# Patient Record
Sex: FEMALE | Race: BLACK OR AFRICAN AMERICAN | NOT HISPANIC OR LATINO | Employment: OTHER | URBAN - NONMETROPOLITAN AREA
[De-identification: names, ages, dates, MRNs, and addresses within clinical notes are randomized per-mention and may not be internally consistent; named-entity substitution may affect disease eponyms.]

---

## 2021-05-26 PROBLEM — Q66.6 PES PLANOVALGUS: Status: ACTIVE | Noted: 2021-05-26

## 2022-01-14 ENCOUNTER — OFFICE VISIT (OUTPATIENT)
Dept: FAMILY MEDICINE | Facility: CLINIC | Age: 47
End: 2022-01-14
Payer: OTHER GOVERNMENT

## 2022-01-14 VITALS
HEART RATE: 96 BPM | OXYGEN SATURATION: 100 % | BODY MASS INDEX: 26.03 KG/M2 | WEIGHT: 162 LBS | HEIGHT: 66 IN | RESPIRATION RATE: 18 BRPM | SYSTOLIC BLOOD PRESSURE: 167 MMHG | DIASTOLIC BLOOD PRESSURE: 112 MMHG

## 2022-01-14 DIAGNOSIS — M25.50 ARTHRALGIA, UNSPECIFIED JOINT: ICD-10-CM

## 2022-01-14 DIAGNOSIS — R26.9 GAIT ABNORMALITY: ICD-10-CM

## 2022-01-14 DIAGNOSIS — M25.20 JOINT LAXITY: ICD-10-CM

## 2022-01-14 DIAGNOSIS — M21.40 PES PLANUS, UNSPECIFIED LATERALITY: Primary | ICD-10-CM

## 2022-01-14 LAB
ALBUMIN SERPL BCP-MCNC: 3.5 G/DL (ref 3.5–5)
ALBUMIN/GLOB SERPL: 0.7 {RATIO}
ALP SERPL-CCNC: 74 U/L (ref 39–100)
ALT SERPL W P-5'-P-CCNC: 16 U/L (ref 13–56)
ANION GAP SERPL CALCULATED.3IONS-SCNC: 7 MMOL/L (ref 7–16)
AST SERPL W P-5'-P-CCNC: 16 U/L (ref 15–37)
BASOPHILS # BLD AUTO: 0.03 K/UL (ref 0–0.2)
BASOPHILS NFR BLD AUTO: 0.8 % (ref 0–1)
BILIRUB SERPL-MCNC: 0.1 MG/DL (ref 0–1.2)
BUN SERPL-MCNC: 18 MG/DL (ref 7–18)
BUN/CREAT SERPL: 17 (ref 6–20)
CALCIUM SERPL-MCNC: 9 MG/DL (ref 8.5–10.1)
CHLORIDE SERPL-SCNC: 109 MMOL/L (ref 98–107)
CO2 SERPL-SCNC: 27 MMOL/L (ref 21–32)
CREAT SERPL-MCNC: 1.03 MG/DL (ref 0.55–1.02)
CRP SERPL-MCNC: <0.29 MG/DL (ref 0–0.8)
DIFFERENTIAL METHOD BLD: ABNORMAL
EOSINOPHIL # BLD AUTO: 0.04 K/UL (ref 0–0.5)
EOSINOPHIL NFR BLD AUTO: 1.1 % (ref 1–4)
ERYTHROCYTE [DISTWIDTH] IN BLOOD BY AUTOMATED COUNT: 17.2 % (ref 11.5–14.5)
ERYTHROCYTE [SEDIMENTATION RATE] IN BLOOD BY WESTERGREN METHOD: 41 MM/HR (ref 0–20)
GLOBULIN SER-MCNC: 5 G/DL (ref 2–4)
GLUCOSE SERPL-MCNC: 80 MG/DL (ref 74–106)
HCT VFR BLD AUTO: 31.6 % (ref 38–47)
HGB BLD-MCNC: 9.6 G/DL (ref 12–16)
IMM GRANULOCYTES # BLD AUTO: 0.01 K/UL (ref 0–0.04)
IMM GRANULOCYTES NFR BLD: 0.3 % (ref 0–0.4)
LYMPHOCYTES # BLD AUTO: 1.53 K/UL (ref 1–4.8)
LYMPHOCYTES NFR BLD AUTO: 41.9 % (ref 27–41)
MCH RBC QN AUTO: 24.9 PG (ref 27–31)
MCHC RBC AUTO-ENTMCNC: 30.4 G/DL (ref 32–36)
MCV RBC AUTO: 82.1 FL (ref 80–96)
MONOCYTES # BLD AUTO: 0.52 K/UL (ref 0–0.8)
MONOCYTES NFR BLD AUTO: 14.2 % (ref 2–6)
MPC BLD CALC-MCNC: 8.3 FL (ref 9.4–12.4)
NEUTROPHILS # BLD AUTO: 1.52 K/UL (ref 1.8–7.7)
NEUTROPHILS NFR BLD AUTO: 41.7 % (ref 53–65)
NRBC # BLD AUTO: 0 X10E3/UL
NRBC, AUTO (.00): 0 %
PLATELET # BLD AUTO: 535 K/UL (ref 150–400)
POTASSIUM SERPL-SCNC: 3.5 MMOL/L (ref 3.5–5.1)
PROT SERPL-MCNC: 8.5 G/DL (ref 6.4–8.2)
RBC # BLD AUTO: 3.85 M/UL (ref 4.2–5.4)
RHEUMATOID FACT SER NEPH-ACNC: NEGATIVE [IU]/ML
SODIUM SERPL-SCNC: 139 MMOL/L (ref 136–145)
TSH SERPL DL<=0.005 MIU/L-ACNC: 0.57 UIU/ML (ref 0.36–3.74)
WBC # BLD AUTO: 3.65 K/UL (ref 4.5–11)

## 2022-01-14 PROCEDURE — 86038 ANTINUCLEAR ANTIBODIES: CPT | Mod: ,,, | Performed by: CLINICAL MEDICAL LABORATORY

## 2022-01-14 PROCEDURE — 86038 ANA EIA W/REFLEX DSDNA/ENA: ICD-10-PCS | Mod: ,,, | Performed by: CLINICAL MEDICAL LABORATORY

## 2022-01-14 PROCEDURE — 80053 COMPREHENSIVE METABOLIC PANEL: ICD-10-PCS | Mod: ,,, | Performed by: CLINICAL MEDICAL LABORATORY

## 2022-01-14 PROCEDURE — 85025 COMPLETE CBC W/AUTO DIFF WBC: CPT | Mod: ,,, | Performed by: CLINICAL MEDICAL LABORATORY

## 2022-01-14 PROCEDURE — 84443 TSH: ICD-10-PCS | Mod: ,,, | Performed by: CLINICAL MEDICAL LABORATORY

## 2022-01-14 PROCEDURE — 86430 RHEUMATOID FACTOR TEST QUAL: CPT | Mod: ,,, | Performed by: CLINICAL MEDICAL LABORATORY

## 2022-01-14 PROCEDURE — 99213 PR OFFICE/OUTPT VISIT, EST, LEVL III, 20-29 MIN: ICD-10-PCS | Mod: ,,, | Performed by: NURSE PRACTITIONER

## 2022-01-14 PROCEDURE — 99213 OFFICE O/P EST LOW 20 MIN: CPT | Mod: ,,, | Performed by: NURSE PRACTITIONER

## 2022-01-14 PROCEDURE — 84443 ASSAY THYROID STIM HORMONE: CPT | Mod: ,,, | Performed by: CLINICAL MEDICAL LABORATORY

## 2022-01-14 PROCEDURE — 86140 C-REACTIVE PROTEIN: ICD-10-PCS | Mod: ,,, | Performed by: CLINICAL MEDICAL LABORATORY

## 2022-01-14 PROCEDURE — 85651 SEDIMENTATION RATE, AUTOMATED: ICD-10-PCS | Mod: ,,, | Performed by: CLINICAL MEDICAL LABORATORY

## 2022-01-14 PROCEDURE — 86430 RHEUMATOID FACTOR SCREEN: ICD-10-PCS | Mod: ,,, | Performed by: CLINICAL MEDICAL LABORATORY

## 2022-01-14 PROCEDURE — 85651 RBC SED RATE NONAUTOMATED: CPT | Mod: ,,, | Performed by: CLINICAL MEDICAL LABORATORY

## 2022-01-14 PROCEDURE — 80053 COMPREHEN METABOLIC PANEL: CPT | Mod: ,,, | Performed by: CLINICAL MEDICAL LABORATORY

## 2022-01-14 PROCEDURE — 86140 C-REACTIVE PROTEIN: CPT | Mod: ,,, | Performed by: CLINICAL MEDICAL LABORATORY

## 2022-01-14 PROCEDURE — 85025 CBC WITH DIFFERENTIAL: ICD-10-PCS | Mod: ,,, | Performed by: CLINICAL MEDICAL LABORATORY

## 2022-01-14 RX ORDER — LOSARTAN POTASSIUM 100 MG/1
1 TABLET ORAL DAILY
COMMUNITY
Start: 2021-07-21 | End: 2022-01-28 | Stop reason: SDUPTHER

## 2022-01-14 NOTE — PROGRESS NOTES
JOSE RAMON Jama   [facility]      PATIENT NAME: Laurel Mary  : 1975  DATE: 22  MRN: [id number]      Patient PCP Information     Provider PCP Type    Primary Doctor No General          Reason for Visit / Chief Complaint: Pain (Pt states she has joint pain in lower back and lower extremeties )         History of Present Illness / Problem Focused Workflow     Laurel Mary presents to the clinic with Pain (Pt states she has joint pain in lower back and lower extremeties )     HPI   Patient has been seen by multiple orthopedic and foot and ankle surgeons (Dr Harrison, Dr Hannon, Dr Clifford, Dr Espinal (Podiatrist). Some of which recommneded patient have surgery for bilateral flat feet (severe pes planovalgus) and progressive genu valgus  Joint pain from back down. Foot and ankle pain worse. Right greater than left ankle progressively weaker and rolling in. Patient does report prior MRI at [facility] for lower back pain. Patient L5 facet joint hypertrophy per patient. Otherwise unremarkable.   Incontinence of urine for approx 10 year. Cannot stand for long period of time to wash dishes or do hair wo sitting. Upper body does not tire out. Lower body cannot stand for long periods of time. Gait with feet pointing out. Shuffling gait. Patient does use rollaider walker at times which helps take some weight off lower ext when ambulating. Increased weight gain in last 2 years. Unable to exercise.     Review of Systems     Review of Systems   Constitutional: Negative.    HENT: Negative.    Eyes: Negative.    Respiratory: Negative.    Cardiovascular: Negative.    Gastrointestinal: Negative.    Endocrine: Negative.    Genitourinary:        Urinary incontinence   Musculoskeletal: Positive for arthralgias, back pain, gait problem and myalgias.   Skin: Negative.    Allergic/Immunologic: Negative.    Neurological: Positive for weakness. Negative for dizziness, seizures, syncope, facial  "asymmetry, speech difficulty, light-headedness, numbness and headaches.   Hematological: Negative.    Psychiatric/Behavioral: Negative.        Medical / Social / Family History     Past Medical History:   Diagnosis Date    Foot drop     Hypertension     Osteoarthritis        History reviewed. No pertinent surgical history.    Social History  Ms.  reports that she has never smoked. She has never used smokeless tobacco.    Family History  Ms.'s family history includes Diabetes in her mother; Hypertension in her father and mother; Leukemia in her father.    Medications and Allergies     Medications  Outpatient Medications Marked as Taking for the 1/14/22 encounter (Office Visit) with JOSE RAMON Jama   Medication Sig Dispense Refill    losartan (COZAAR) 100 MG tablet Take 1 tablet by mouth once daily.         Allergies  Review of patient's allergies indicates:  No Known Allergies    Physical Examination     Vitals:    01/14/22 1034 01/14/22 1035   BP: (!) 161/96 (!) 167/112   BP Location: Right arm Left arm   Patient Position: Sitting Sitting   Pulse: 96    Resp: 18    SpO2: 100%    Weight: 73.5 kg (162 lb)    Height: 5' 6" (1.676 m)    patient did not take blood pressure medication prior to arrival.     Physical Exam  Vitals reviewed.   Constitutional:       Appearance: Normal appearance.   HENT:      Head: Normocephalic.      Right Ear: External ear normal.      Left Ear: External ear normal.      Nose: Nose normal.   Eyes:      Extraocular Movements: Extraocular movements intact.   Cardiovascular:      Rate and Rhythm: Normal rate and regular rhythm.      Pulses: Normal pulses.      Heart sounds: Normal heart sounds.   Pulmonary:      Effort: Pulmonary effort is normal.      Breath sounds: Normal breath sounds.   Abdominal:      Palpations: Abdomen is soft.   Musculoskeletal:      Cervical back: Normal range of motion.      Comments: Severe bilateral pes planus with valgus deformity at ankle, knees. " Weakness L>R lower ext  Shuffling gait. Ankles rolling in. Hunch over appearance difficulty holding back up at times.    Skin:     General: Skin is warm and dry.      Capillary Refill: Capillary refill takes less than 2 seconds.   Neurological:      General: No focal deficit present.      Mental Status: She is alert and oriented to person, place, and time.      Motor: Weakness present.      Coordination: Coordination abnormal.      Gait: Gait abnormal.   Psychiatric:         Mood and Affect: Mood normal.         Behavior: Behavior normal.         Thought Content: Thought content normal.         Judgment: Judgment normal.           No visits with results within 14 Day(s) from this visit.   Latest known visit with results is:   No results found for any previous visit.             Assessment and Plan (including Health Maintenance)     Plan:   Pes planus bilateral  Joint laxitys, unspecified laterality  Arthralgia, unspecified joint  Gait abnormality  -     Rheumatoid Factor Screen; Future; Expected date: 01/14/2022  -     MERLY EIA w/ Reflex to dsDNA/ISAIAH; Future; Expected date: 01/14/2022  -     Sedimentation Rate; Future; Expected date: 01/14/2022  -     C-Reactive Protein; Future; Expected date: 01/14/2022  -     Comprehensive Metabolic Panel; Future; Expected date: 01/14/2022  -     CBC Auto Differential; Future; Expected date: 01/14/2022  -     TSH; Future; Expected date: 01/14/2022    Referrals have been placed for neurology and rheumoatology to rule out neuromuscular disorder.   Financial assistance application packet printed out for patient.     Hypertension, uncontrolled  -encouraged medication compliance. Reviewed meds and education on schedule   Close follow up in 2 weeks for bp recheck and to discuss referrals         There are no Patient Instructions on file for this visit.       Health Maintenance Due   Topic Date Due    Hepatitis C Screening  Never done    Lipid Panel  Never done    COVID-19 Vaccine (1)  Never done    HIV Screening  Never done    TETANUS VACCINE  Never done    Mammogram  Never done    Cervical Cancer Screening  Never done    Influenza Vaccine (1) 09/01/2021         There is no immunization history on file for this patient.     Problem List Items Addressed This Visit    None     Visit Diagnoses     Pes planus, unspecified laterality    -  Primary    Relevant Orders    Rheumatoid Factor Screen    MERLY EIA w/ Reflex to dsDNA/ISAIAH    Sedimentation Rate    C-Reactive Protein    Comprehensive Metabolic Panel    CBC Auto Differential    TSH    Gait abnormality        Relevant Orders    Rheumatoid Factor Screen    MERLY EIA w/ Reflex to dsDNA/ISAIAH    Sedimentation Rate    C-Reactive Protein    Comprehensive Metabolic Panel    CBC Auto Differential    TSH    Ambulatory referral/consult to Neurology    Ambulatory referral/consult to Rheumatology    Joint laxity        Relevant Orders    Rheumatoid Factor Screen    MERLY EIA w/ Reflex to dsDNA/ISAIAH    Sedimentation Rate    C-Reactive Protein    Comprehensive Metabolic Panel    CBC Auto Differential    TSH    Ambulatory referral/consult to Neurology    Ambulatory referral/consult to Rheumatology    Arthralgia, unspecified joint        Relevant Orders    Rheumatoid Factor Screen    MERLY EIA w/ Reflex to dsDNA/ISAIAH    Sedimentation Rate    C-Reactive Protein    Comprehensive Metabolic Panel    CBC Auto Differential    TSH    Ambulatory referral/consult to Neurology    Ambulatory referral/consult to Rheumatology          Health Maintenance Topics with due status: Not Due       Topic Last Completion Date    Colorectal Cancer Screening 10/01/2021       Future Appointments   Date Time Provider Department Center   1/17/2022 10:00 AM JOSE RAMON [facility] RY[facility]            Signature:  JOSE RAMON [facility]     Date of encounter: 1/14/22

## 2022-01-18 ENCOUNTER — TELEPHONE (OUTPATIENT)
Dept: PEDIATRICS | Facility: CLINIC | Age: 47
End: 2022-01-18
Payer: OTHER GOVERNMENT

## 2022-01-18 LAB — ANA SER QL: NEGATIVE

## 2022-01-18 NOTE — TELEPHONE ENCOUNTER
Called [facility] to make neurology referral. Unable to make neurology or rheumatology referrals until financial counseling complete. As patient is currently self pay. Referral for financial counseling placed per Amelia with neurology at [facility]. Will fax records/clinic note. Patient needs to call Financial counseling office at [phone] to start pre screening process. Attempted to call patient and notify her to call and begin process. No answer. Left message for her to call Chencho NP at clinic to discuss. Fax for [facility] neurology [phone]

## 2022-01-28 ENCOUNTER — OFFICE VISIT (OUTPATIENT)
Dept: FAMILY MEDICINE | Facility: CLINIC | Age: 47
End: 2022-01-28
Payer: OTHER GOVERNMENT

## 2022-01-28 VITALS
HEART RATE: 90 BPM | OXYGEN SATURATION: 100 % | WEIGHT: 158.25 LBS | SYSTOLIC BLOOD PRESSURE: 174 MMHG | HEIGHT: 66 IN | TEMPERATURE: 98 F | DIASTOLIC BLOOD PRESSURE: 123 MMHG | BODY MASS INDEX: 25.43 KG/M2 | RESPIRATION RATE: 18 BRPM

## 2022-01-28 DIAGNOSIS — M25.20 JOINT LAXITY: ICD-10-CM

## 2022-01-28 DIAGNOSIS — M21.42 PES PLANUS OF BOTH FEET: ICD-10-CM

## 2022-01-28 DIAGNOSIS — I10 UNCONTROLLED HYPERTENSION: Primary | ICD-10-CM

## 2022-01-28 DIAGNOSIS — R53.1 WEAKNESS: ICD-10-CM

## 2022-01-28 DIAGNOSIS — M21.41 PES PLANUS OF BOTH FEET: ICD-10-CM

## 2022-01-28 PROCEDURE — 99214 OFFICE O/P EST MOD 30 MIN: CPT | Mod: ,,, | Performed by: NURSE PRACTITIONER

## 2022-01-28 PROCEDURE — 99214 PR OFFICE/OUTPT VISIT, EST, LEVL IV, 30-39 MIN: ICD-10-PCS | Mod: ,,, | Performed by: NURSE PRACTITIONER

## 2022-01-28 RX ORDER — LOSARTAN POTASSIUM 100 MG/1
100 TABLET ORAL DAILY
Qty: 90 TABLET | Refills: 3 | Status: SHIPPED | OUTPATIENT
Start: 2022-01-28 | End: 2022-04-12 | Stop reason: SDUPTHER

## 2022-01-28 NOTE — PROGRESS NOTES
Olivia Linares, JOSE RAMON   [facility]      PATIENT NAME: Laurel Mary  : 1975  DATE: 22  MRN: [id number]      Patient PCP Information     Provider PCP Type    Primary Doctor No General          Reason for Visit / Chief Complaint: Follow-up (Room6)       History of Present Illness / Problem Focused Workflow     Laurel Mary presents to the clinic with Follow-up (Room6)     HPI   Patient here for follow up on HTN, lower ext weakness at flat foot with gait abnormality.   As previously noted referrals were placed on last visit for neurology and rheumatology eval due to lower ext   Weakness and joint pain. Neuromuscular do needs to be evaluated. Patient has been seen by multiple podiatrist and orthopedic surgeons.   Some of which recommended surgery for  Pes planus some of which recommended neuro evaluation as well.   Spoke with [facility] prev to schedule appt. Financial assistance recommended. Patient states she did call financial assistance number for prescreening however patient [facility], however she [facility]. Patient informed me [facility] needed proof of referral. Copy of prev note with documented referral recommendation given to patient to send to [facility].   Patient also with continued HTN today. Patient has continued to be noncompliant with blood pressure medication. Patient states she forgets to take and also spilled bottle of blood pressure medication. Novasc was dc in past due to lower ext swelling. Patient was prescribed to take losartan.   Had lengthy discussion with patient on risk associated with uncontrolled blood pressure. Stressed importance of medication compliance. Cannot determine efficacy of medication if she is not compliant. Patient verbalized understanding.     Review of Systems     Review of Systems   Constitutional: Negative for activity change, appetite change, chills,  diaphoresis, fatigue, fever and unexpected weight change.   HENT: Negative for congestion, ear pain, facial swelling, hearing loss, nosebleeds and sore throat.    Respiratory: Negative for apnea, cough, shortness of breath and wheezing.    Cardiovascular: Negative for chest pain, palpitations and leg swelling.   Gastrointestinal: Negative for abdominal distention, abdominal pain, blood in stool, constipation, diarrhea and nausea.   Endocrine: Negative for cold intolerance, heat intolerance, polydipsia, polyphagia and polyuria.   Genitourinary: Negative for decreased urine volume, difficulty urinating, dysuria, flank pain, frequency, hematuria and urgency.   Musculoskeletal: Positive for arthralgias, back pain, gait problem and myalgias. Negative for joint swelling.   Skin: Negative for color change and rash.   Neurological: Positive for numbness. Negative for dizziness, tremors, seizures, syncope, facial asymmetry, speech difficulty, weakness, light-headedness and headaches.        Tingling to tongue   Hematological: Negative for adenopathy. Does not bruise/bleed easily.   Psychiatric/Behavioral: Negative for behavioral problems and confusion.       Medical / Social / Family History     Past Medical History:   Diagnosis Date    Foot drop     Hypertension     Osteoarthritis        History reviewed. No pertinent surgical history.    Social History  Ms.  reports that she has never smoked. She has never used smokeless tobacco.    Family History  MsAarti's family history includes Diabetes in her mother; Hypertension in her father and mother; Leukemia in her father.    Medications and Allergies     Medications  No outpatient medications have been marked as taking for the 1/28/22 encounter (Office Visit) with JOSE RAMON Jama.       Allergies  Review of patient's allergies indicates:  No Known Allergies    Physical Examination     Vitals:    01/28/22 0930 01/28/22 0931   BP: (!) 158/107 (!) 174/123   BP Location: Left  "arm Right arm   Patient Position: Sitting Sitting   Pulse: 90    Resp: 18    Temp: 98.2 °F (36.8 °C)    SpO2: 100%    Weight: 71.8 kg (158 lb 4 oz)    Height: 5' 6" (1.676 m)    Noncompliant with medication     Physical Exam  Vitals reviewed.   Constitutional:       Appearance: Normal appearance.   HENT:      Head: Normocephalic.      Right Ear: External ear normal.      Left Ear: External ear normal.      Nose: Nose normal.      Mouth/Throat:      Mouth: Mucous membranes are moist.   Eyes:      Extraocular Movements: Extraocular movements intact.   Cardiovascular:      Rate and Rhythm: Normal rate.      Pulses: Normal pulses.   Pulmonary:      Effort: Pulmonary effort is normal.      Breath sounds: Normal breath sounds.   Abdominal:      General: Abdomen is flat.   Musculoskeletal:      Cervical back: Normal range of motion.      Comments: Bilateral pes planus with laxity at ankle gait abnormality   Weakness L>R   Skin:     General: Skin is warm and dry.      Capillary Refill: Capillary refill takes less than 2 seconds.   Neurological:      General: No focal deficit present.      Mental Status: She is alert and oriented to person, place, and time.   Psychiatric:         Mood and Affect: Mood normal.         Behavior: Behavior normal.         Thought Content: Thought content normal.         Judgment: Judgment normal.           No visits with results within 14 Day(s) from this visit.   Latest known visit with results is:   Office Visit on 01/14/2022   Component Date Value Ref Range Status    RA 01/14/2022 Negative  Negative Final    MERLY Screen 01/14/2022 Negative  Negative Final    ESR Westergren 01/14/2022 41* 0 - 20 mm/Hr Final    CRP 01/14/2022 <0.29  0.00 - 0.80 mg/dL Final    Sodium 01/14/2022 139  136 - 145 mmol/L Final    Potassium 01/14/2022 3.5  3.5 - 5.1 mmol/L Final    Chloride 01/14/2022 109* 98 - 107 mmol/L Final    CO2 01/14/2022 27  21 - 32 mmol/L Final    Anion Gap 01/14/2022 7  7 - 16 mmol/L " Final    Glucose 01/14/2022 80  74 - 106 mg/dL Final    BUN 01/14/2022 18  7 - 18 mg/dL Final    Creatinine 01/14/2022 1.03* 0.55 - 1.02 mg/dL Final    BUN/Creatinine Ratio 01/14/2022 17  6 - 20 Final    Calcium 01/14/2022 9.0  8.5 - 10.1 mg/dL Final    Total Protein 01/14/2022 8.5* 6.4 - 8.2 g/dL Final    Albumin 01/14/2022 3.5  3.5 - 5.0 g/dL Final    Globulin 01/14/2022 5.0* 2.0 - 4.0 g/dL Final    A/G Ratio 01/14/2022 0.7   Final    Bilirubin, Total 01/14/2022 0.1  0.0 - 1.2 mg/dL Final    Alk Phos 01/14/2022 74  39 - 100 U/L Final    ALT 01/14/2022 16  13 - 56 U/L Final    AST 01/14/2022 16  15 - 37 U/L Final    eGFR 01/14/2022 61  >=60 mL/min/1.73m² Final    TSH 01/14/2022 0.567  0.358 - 3.740 uIU/mL Final    WBC 01/14/2022 3.65* 4.50 - 11.00 K/uL Final    RBC 01/14/2022 3.85* 4.20 - 5.40 M/uL Final    Hemoglobin 01/14/2022 9.6* 12.0 - 16.0 g/dL Final    Hematocrit 01/14/2022 31.6* 38.0 - 47.0 % Final    MCV 01/14/2022 82.1  80.0 - 96.0 fL Final    MCH 01/14/2022 24.9* 27.0 - 31.0 pg Final    MCHC 01/14/2022 30.4* 32.0 - 36.0 g/dL Final    RDW 01/14/2022 17.2* 11.5 - 14.5 % Final    Platelet Count 01/14/2022 535* 150 - 400 K/uL Final    MPV 01/14/2022 8.3* 9.4 - 12.4 fL Final    Neutrophils % 01/14/2022 41.7* 53.0 - 65.0 % Final    Lymphocytes % 01/14/2022 41.9* 27.0 - 41.0 % Final    Monocytes % 01/14/2022 14.2* 2.0 - 6.0 % Final    Eosinophils % 01/14/2022 1.1  1.0 - 4.0 % Final    Basophils % 01/14/2022 0.8  0.0 - 1.0 % Final    Immature Granulocytes % 01/14/2022 0.3  0.0 - 0.4 % Final    nRBC, Auto 01/14/2022 0.0  <=0.0 % Final    Neutrophils, Abs 01/14/2022 1.52* 1.80 - 7.70 K/uL Final    Lymphocytes, Absolute 01/14/2022 1.53  1.00 - 4.80 K/uL Final    Monocytes, Absolute 01/14/2022 0.52  0.00 - 0.80 K/uL Final    Eosinophils, Absolute 01/14/2022 0.04  0.00 - 0.50 K/uL Final    Basophils, Absolute 01/14/2022 0.03  0.00 - 0.20 K/uL Final    Immature Granulocytes,  "Absolute 01/14/2022 0.01  0.00 - 0.04 K/uL Final    nRBC, Absolute 01/14/2022 0.00  <=0.00 x10e3/uL Final    Diff Type 01/14/2022 Auto   Final             Assessment and Plan (including Health Maintenance)   :    Plan:   Uncontrolled hypertension    Pes planus of both feet    Joint laxity    Weakness    Other orders  -     losartan (COZAAR) 100 MG tablet; Take 1 tablet (100 mg total) by mouth once daily.  Dispense: 90 tablet; Refill: 3    Referral still pending for neurology and rheumatology  Patient to follow up in 2 weeks for blood pressure check. Discussed compliance  Patient to call [facility] send last note recommendation for referral to get appt scheduled    > 30 min spent with patient   Patient Instructions     Patient Education       High Blood Pressure in Adults   The Basics   Written by the doctors and editors at [facility]   What is high blood pressure? -- High blood pressure is a condition that puts you at risk for heart attack, stroke, and kidney disease. It does not usually cause symptoms. But it can be serious.  When your doctor or nurse tells you your blood pressure, they will say 2 numbers. For instance, your doctor or nurse might say that your blood pressure is "130 over 80." The top number is the pressure inside your arteries when your heart is rodriguez. The bottom number is the pressure inside your arteries when your heart is relaxed.  "Elevated blood pressure" is a term doctors or nurses use as a warning. People with elevated blood pressure do not yet have high blood pressure. But their blood pressure is not as low as it should be for good health.  Many experts define high, elevated, and normal blood pressure as follows:  · High - Top number of 130 or above and/or bottom number of 80 or above  · Elevated - Top number between 120 and 129 and bottom number of 79 or below  · Normal - Top number of 119 or below and bottom number of 79 or below  This information is also in the table (table 1). " "  How can I lower my blood pressure? -- If your doctor or nurse has prescribed blood pressure medicine, the most important thing you can do is to take it. If it causes side effects, do not just stop taking it. Instead, talk to your doctor or nurse about the problems it causes. They might be able to lower your dose or switch you to another medicine. If cost is a problem, mention that too. They might be able to put you on a less expensive medicine. Taking your blood pressure medicine can keep you from having a heart attack or stroke, and it can save your life!  Can I do anything on my own? -- You have a lot of control over your blood pressure. To lower it:  · Lose weight (if you are overweight)  · Choose a diet low in fat and rich in fruits, vegetables, and low-fat dairy products  · Reduce the amount of salt you eat  · Do something active for at least 30 minutes a day on most days of the week  · Cut down on alcohol (if you drink more than 2 alcoholic drinks per day)  It's also a good idea to get a home blood pressure meter. People who check their own blood pressure at home do better at keeping it low and can sometimes even reduce the amount of medicine they take.  All topics are updated as new evidence becomes available and our peer review process is complete.  This topic retrieved from [employer] on: Sep 21, 2021.  Topic 80515 Version 15.0  Release: 29.4.2 - C29.263  © 2021 UpToDate, Inc. and/or its affiliates. All rights reserved.  table 1: Definition of normal and high blood pressure  Level  Top number  Bottom number    High 130 or above 80 or above   Elevated 120 to 129 79 or below   Normal 119 or below 79 or below   · These definitions are from the American College of Cardiology/American Heart Association. Other expert groups might use slightly different definitions.  · "Elevated blood pressure" is a term doctor or nurses use as a warning. It means you do not yet have high blood pressure, but your blood pressure is " not as low as it should be for good health.  Graphic 81178 Version 6.0  Consumer Information Use and Disclaimer   This information is not specific medical advice and does not replace information you receive from your health care provider. This is only a brief summary of general information. It does NOT include all information about conditions, illnesses, injuries, tests, procedures, treatments, therapies, discharge instructions or life-style choices that may apply to you. You must talk with your health care provider for complete information about your health and treatment options. This information should not be used to decide whether or not to accept your health care provider's advice, instructions or recommendations. Only your health care provider has the knowledge and training to provide advice that is right for you. The use of this information is governed by the [employer] End User License Agreement, available at https://www.WhoKnows/en/solutions/Macrocosm/about/joe.The use of [employer] content is governed by the [employer] Terms of Use. ©2021 UpToDate, Inc. All rights reserved.  Copyright   © 2021 UpToDate, Inc. and/or its affiliates. All rights reserved.           Health Maintenance Due   Topic Date Due    Hepatitis C Screening  Never done    Lipid Panel  Never done    COVID-19 Vaccine (1) Never done    HIV Screening  Never done    TETANUS VACCINE  Never done    Mammogram  Never done    Cervical Cancer Screening  Never done    Influenza Vaccine (1) 09/01/2021         There is no immunization history on file for this patient.     Problem List Items Addressed This Visit    None     Visit Diagnoses     Uncontrolled hypertension    -  Primary    Pes planus of both feet        Joint laxity        Weakness              Health Maintenance Topics with due status: Not Due       Topic Last Completion Date    Colorectal Cancer Screening 10/01/2021       Future Appointments   Date Time Provider Department  Center   2/11/2022 10:30 AM JOSE RAMON Jama [facility]            Signature:  JOSE RAMON Jama  [facility]     Date of encounter: 1/28/22

## 2022-01-28 NOTE — PATIENT INSTRUCTIONS
"Patient Education       High Blood Pressure in Adults   The Basics   Written by the doctors and editors at [facility]   What is high blood pressure? -- High blood pressure is a condition that puts you at risk for heart attack, stroke, and kidney disease. It does not usually cause symptoms. But it can be serious.  When your doctor or nurse tells you your blood pressure, they will say 2 numbers. For instance, your doctor or nurse might say that your blood pressure is "130 over 80." The top number is the pressure inside your arteries when your heart is rodriguez. The bottom number is the pressure inside your arteries when your heart is relaxed.  "Elevated blood pressure" is a term doctors or nurses use as a warning. People with elevated blood pressure do not yet have high blood pressure. But their blood pressure is not as low as it should be for good health.  Many experts define high, elevated, and normal blood pressure as follows:  · High - Top number of 130 or above and/or bottom number of 80 or above  · Elevated - Top number between 120 and 129 and bottom number of 79 or below  · Normal - Top number of 119 or below and bottom number of 79 or below  This information is also in the table (table 1).   How can I lower my blood pressure? -- If your doctor or nurse has prescribed blood pressure medicine, the most important thing you can do is to take it. If it causes side effects, do not just stop taking it. Instead, talk to your doctor or nurse about the problems it causes. They might be able to lower your dose or switch you to another medicine. If cost is a problem, mention that too. They might be able to put you on a less expensive medicine. Taking your blood pressure medicine can keep you from having a heart attack or stroke, and it can save your life!  Can I do anything on my own? -- You have a lot of control over your blood pressure. To lower it:  · Lose weight (if you are overweight)  · Choose a diet low in fat and " "rich in fruits, vegetables, and low-fat dairy products  · Reduce the amount of salt you eat  · Do something active for at least 30 minutes a day on most days of the week  · Cut down on alcohol (if you drink more than 2 alcoholic drinks per day)  It's also a good idea to get a home blood pressure meter. People who check their own blood pressure at home do better at keeping it low and can sometimes even reduce the amount of medicine they take.  All topics are updated as new evidence becomes available and our peer review process is complete.  This topic retrieved from [employer] on: Sep 21, 2021.  Topic 17166 Version 15.0  Release: 29.4.2 - C29.263  © 2021 UpToDate, Inc. and/or its affiliates. All rights reserved.  table 1: Definition of normal and high blood pressure  Level  Top number  Bottom number    High 130 or above 80 or above   Elevated 120 to 129 79 or below   Normal 119 or below 79 or below   · These definitions are from the American College of Cardiology/American Heart Association. Other expert groups might use slightly different definitions.  · "Elevated blood pressure" is a term doctor or nurses use as a warning. It means you do not yet have high blood pressure, but your blood pressure is not as low as it should be for good health.  Graphic 20350 Version 6.0  Consumer Information Use and Disclaimer   This information is not specific medical advice and does not replace information you receive from your health care provider. This is only a brief summary of general information. It does NOT include all information about conditions, illnesses, injuries, tests, procedures, treatments, therapies, discharge instructions or life-style choices that may apply to you. You must talk with your health care provider for complete information about your health and treatment options. This information should not be used to decide whether or not to accept your health care provider's advice, instructions or recommendations. Only " your health care provider has the knowledge and training to provide advice that is right for you. The use of this information is governed by the [employer] End User License Agreement, available at [email or web address]://[email or web address]/[email or web address]/about/joe.The use of [employer] content is governed by the [employer] Terms of Use. ©2021 UpToDate, Inc. All rights reserved.  Copyright   © 2021 UpToDate, Inc. and/or its affiliates. All rights reserved.

## 2022-02-22 ENCOUNTER — OFFICE VISIT (OUTPATIENT)
Dept: FAMILY MEDICINE | Facility: CLINIC | Age: 47
End: 2022-02-22
Payer: OTHER GOVERNMENT

## 2022-02-22 VITALS
BODY MASS INDEX: 26.2 KG/M2 | RESPIRATION RATE: 16 BRPM | WEIGHT: 163 LBS | HEART RATE: 102 BPM | DIASTOLIC BLOOD PRESSURE: 102 MMHG | HEIGHT: 66 IN | SYSTOLIC BLOOD PRESSURE: 160 MMHG

## 2022-02-22 DIAGNOSIS — M21.42 PES PLANUS OF BOTH FEET: ICD-10-CM

## 2022-02-22 DIAGNOSIS — I10 UNCONTROLLED HYPERTENSION: Primary | ICD-10-CM

## 2022-02-22 DIAGNOSIS — R29.898 WEAKNESS OF RIGHT LOWER EXTREMITY: ICD-10-CM

## 2022-02-22 DIAGNOSIS — M21.41 PES PLANUS OF BOTH FEET: ICD-10-CM

## 2022-02-22 DIAGNOSIS — M25.50 HYPERMOBILITY ARTHRALGIA: ICD-10-CM

## 2022-02-22 PROCEDURE — 99214 PR OFFICE/OUTPT VISIT, EST, LEVL IV, 30-39 MIN: ICD-10-PCS | Mod: ,,, | Performed by: FAMILY MEDICINE

## 2022-02-22 PROCEDURE — 99214 OFFICE O/P EST MOD 30 MIN: CPT | Mod: ,,, | Performed by: FAMILY MEDICINE

## 2022-02-22 RX ORDER — METOPROLOL SUCCINATE 50 MG/1
50 TABLET, EXTENDED RELEASE ORAL DAILY
Qty: 30 TABLET | Refills: 11 | Status: SHIPPED | OUTPATIENT
Start: 2022-02-22 | End: 2022-04-12 | Stop reason: SDUPTHER

## 2022-02-22 NOTE — PROGRESS NOTES
Antonio Wilson MD        PATIENT NAME: Laurel Mary  : 1975  DATE: 22  MRN: [id number]      Billing Provider: Antonio Wilson MD  Level of Service: [place] OFFICE/OUTPT VISIT, EST, LEVL IV, 30-39 MIN  Patient PCP Information     Provider PCP Type    Antonio Wilson MD General          Reason for Visit / Chief Complaint: Joint Pain (Pain in multiple joints)       Update PCP  Update Chief Complaint         History of Present Illness / Problem Focused Workflow     Laurel Mary presents to the clinic with Joint Pain (Pain in multiple joints)     Pt has experienced hypermobility.  ?charcot foot.  Blood pressure has been elevated in the past.  She does have painful joints and feels she has some instability in her joints in her lower extremity she does have a history of flat feet.      Review of Systems     Review of Systems   Constitutional: Negative for activity change, appetite change, fever and unexpected weight change.   HENT: Negative for congestion, rhinorrhea, sinus pressure, sinus pain, sore throat and trouble swallowing.    Eyes: Negative for photophobia, pain, discharge and visual disturbance.   Respiratory: Negative for cough, chest tightness, wheezing and stridor.    Cardiovascular: Negative for chest pain, palpitations and leg swelling.   Gastrointestinal: Negative for abdominal pain, blood in stool, constipation, diarrhea and nausea.   Endocrine: Negative for polydipsia, polyphagia and polyuria.   Genitourinary: Negative for difficulty urinating, flank pain and hematuria.   Musculoskeletal: Positive for arthralgias, back pain, gait problem and myalgias. Negative for neck pain.   Skin: Negative for rash.   Allergic/Immunologic: Negative for food allergies.   Neurological: Negative for dizziness, tremors, seizures, syncope, weakness (global weakness) and headaches.   Psychiatric/Behavioral: Negative for behavioral problems, confusion, decreased concentration, dysphoric mood and hallucinations.  The patient is not nervous/anxious.         Medical / Social / Family History     Past Medical History:   Diagnosis Date    Foot drop     Hypertension     Osteoarthritis        History reviewed. No pertinent surgical history.    Social History  Ms.  reports that she has never smoked. She has never used smokeless tobacco.    Family History  Ms.'s family history includes Diabetes in her mother; Hypertension in her father and mother; Leukemia in her father.    Medications and Allergies     Medications  No outpatient medications have been marked as taking for the 2/22/22 encounter (Office Visit) with Antonio Wilson MD.       Allergies  Review of patient's allergies indicates:  No Known Allergies    Physical Examination     Vitals:    02/22/22 1549   BP: (!) 160/102   Pulse: 102   Resp: 16     Physical Exam  Constitutional:       General: She is not in acute distress.     Appearance: Normal appearance.   HENT:      Head: Normocephalic.      Right Ear: Tympanic membrane and ear canal normal.      Left Ear: Tympanic membrane and ear canal normal.      Nose: Nose normal.      Mouth/Throat:      Mouth: Mucous membranes are moist.      Pharynx: No oropharyngeal exudate.   Eyes:      Extraocular Movements: Extraocular movements intact.      Pupils: Pupils are equal, round, and reactive to light.   Cardiovascular:      Rate and Rhythm: Normal rate and regular rhythm.      Heart sounds: No murmur heard.  Pulmonary:      Effort: Pulmonary effort is normal.      Breath sounds: Normal breath sounds. No wheezing.   Abdominal:      General: Abdomen is flat. Bowel sounds are normal.      Palpations: Abdomen is soft.      Hernia: No hernia is present.   Musculoskeletal:         General: Deformity present. Normal range of motion.      Cervical back: Normal range of motion and neck supple.      Right lower leg: No edema.      Left lower leg: No edema.      Comments:  marked pes planus bilaterally   Lymphadenopathy:      Cervical: No  cervical adenopathy.   Skin:     General: Skin is warm and dry.      Coloration: Skin is not jaundiced.      Findings: No lesion.   Neurological:      General: No focal deficit present.      Mental Status: She is alert and oriented to person, place, and time.      Cranial Nerves: No cranial nerve deficit.      Gait: Gait normal.   Psychiatric:         Mood and Affect: Mood normal.         Behavior: Behavior normal.         Judgment: Judgment normal.          Assessment and Plan (including Health Maintenance)      Problem List  [employer]  Document Outside HM   :    Plan:   Uncontrolled hypertension  -     metoprolol succinate (TOPROL-XL) 50 MG 24 hr tablet; Take 1 tablet (50 mg total) by mouth once daily.  Dispense: 30 tablet; Refill: 11    Hypermobility arthralgia  -     Ambulatory referral/consult to Rheumatology; Future; Expected date: 03/08/2022    Weakness of right lower extremity  -     Ambulatory referral/consult to Neurology; Future; Expected date: 03/08/2022    Pes planus of both feet           Health Maintenance Due   Topic Date Due    Hepatitis C Screening  Never done    Cervical Cancer Screening  Never done    Lipid Panel  Never done    COVID-19 Vaccine (1) Never done    HIV Screening  Never done    TETANUS VACCINE  Never done    Mammogram  Never done    Influenza Vaccine (1) 09/01/2021       Problem List Items Addressed This Visit    None     Visit Diagnoses     Uncontrolled hypertension    -  Primary    Relevant Medications    metoprolol succinate (TOPROL-XL) 50 MG 24 hr tablet    Hypermobility arthralgia        Relevant Orders    Ambulatory referral/consult to Rheumatology    Weakness of right lower extremity        Relevant Orders    Ambulatory referral/consult to Neurology    Pes planus of both feet              Health Maintenance Topics with due status: Not Due       Topic Last Completion Date    Colorectal Cancer Screening 10/01/2021       Future Appointments   Date Time Provider  Department Center   3/3/2022 10:40 AM AUGUST GARNETT [facility] DAKSHA [facility] [place]   3/22/2022 10:50 AM Antonio Wilson MD [facility]        There are no Patient Instructions on file for this visit.  Follow up in about 4 weeks (around 3/22/2022) for routine followup.     Signature:  Antonio Wilson MD      Date of encounter: 2/22/22

## 2022-03-03 ENCOUNTER — OFFICE VISIT (OUTPATIENT)
Dept: RHEUMATOLOGY | Facility: CLINIC | Age: 47
End: 2022-03-03
Payer: OTHER GOVERNMENT

## 2022-03-03 VITALS
DIASTOLIC BLOOD PRESSURE: 90 MMHG | HEART RATE: 99 BPM | SYSTOLIC BLOOD PRESSURE: 140 MMHG | OXYGEN SATURATION: 98 % | RESPIRATION RATE: 16 BRPM

## 2022-03-03 DIAGNOSIS — M62.81 PROXIMAL LIMB MUSCLE WEAKNESS: Primary | ICD-10-CM

## 2022-03-03 DIAGNOSIS — G61.81 CIDP (CHRONIC INFLAMMATORY DEMYELINATING POLYNEUROPATHY): ICD-10-CM

## 2022-03-03 DIAGNOSIS — S54.00XA: ICD-10-CM

## 2022-03-03 DIAGNOSIS — G37.9 DEMYELINATING DISEASE OF CENTRAL NERVOUS SYSTEM, UNSPECIFIED: ICD-10-CM

## 2022-03-03 DIAGNOSIS — M21.332 WRIST DROP, LEFT: ICD-10-CM

## 2022-03-03 DIAGNOSIS — G72.9 MYOPATHY: ICD-10-CM

## 2022-03-03 DIAGNOSIS — M21.371 ACQUIRED RIGHT FOOT DROP: ICD-10-CM

## 2022-03-03 DIAGNOSIS — M25.50 HYPERMOBILITY ARTHRALGIA: ICD-10-CM

## 2022-03-03 DIAGNOSIS — I77.6 VASCULITIS: ICD-10-CM

## 2022-03-03 PROCEDURE — 99205 PR OFFICE/OUTPT VISIT, NEW, LEVL V, 60-74 MIN: ICD-10-PCS | Mod: S$PBB,,, | Performed by: INTERNAL MEDICINE

## 2022-03-03 PROCEDURE — 99205 OFFICE O/P NEW HI 60 MIN: CPT | Mod: S$PBB,,, | Performed by: INTERNAL MEDICINE

## 2022-03-03 RX ORDER — PREDNISONE 5 MG/1
20 TABLET ORAL
Qty: 500 TABLET | Refills: 1 | Status: SHIPPED | OUTPATIENT
Start: 2022-03-03 | End: 2022-04-12

## 2022-03-03 RX ORDER — SULFAMETHOXAZOLE AND TRIMETHOPRIM 800; 160 MG/1; MG/1
1 TABLET ORAL
Status: DISCONTINUED | OUTPATIENT
Start: 2022-03-04 | End: 2022-03-03

## 2022-03-03 RX ORDER — SODIUM CHLORIDE 0.9 % (FLUSH) 0.9 %
10 SYRINGE (ML) INJECTION
Status: CANCELLED | OUTPATIENT
Start: 2022-03-10

## 2022-03-03 RX ORDER — SULFAMETHOXAZOLE AND TRIMETHOPRIM 800; 160 MG/1; MG/1
1 TABLET ORAL
Qty: 36 TABLET | Refills: 1 | Status: SHIPPED | OUTPATIENT
Start: 2022-03-04 | End: 2022-04-12

## 2022-03-03 RX ORDER — HEPARIN 100 UNIT/ML
500 SYRINGE INTRAVENOUS
Status: CANCELLED | OUTPATIENT
Start: 2022-03-10

## 2022-03-03 RX ORDER — SULFAMETHOXAZOLE AND TRIMETHOPRIM 800; 160 MG/1; MG/1
1 TABLET ORAL
Status: CANCELLED | OUTPATIENT
Start: 2022-03-04

## 2022-03-03 NOTE — Clinical Note
Hello this patient truly needs pulse dose 1000mg solumedrol x 3 doses but given that we are starting with one day per week infusion, it is OK to give her a single dose for now.  Will reassess later.  She is on oral steroids as well.  Indication: mononeuritis multiplex, CIDP , vasculitis

## 2022-03-03 NOTE — Clinical Note
Edvin,  This is brad [new rheumatologist]. I suspect this young lady has CIDP vs a systemic vasculitis which is am still working up. However given active mono-neuritis multiplex like etiology, I have started her on pulse steroids x1 and will likely request an EMG/NCV Could you help us with arranging for this.  I have also placed a referral to neurology.  She may end up requiring IVIG therapy whether its CIDP or primary vasculitis. I am happy to order IVIG but wanted her to get [facility] this [facility].   Regards, Brad

## 2022-03-03 NOTE — Clinical Note
Edvin Marcial, I am the new rheumatologist who met you a couple of months ago. I started this week. I believe Laurel is [facility] get [facility] as [facility].  In any case , I wanted you to know she has a progressive neuromuscular condition. 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  I am starting pulse dose steroids while I await EMG/NCV w/up completion and neurology review.   I suspect she is likely going to end of on IVIG.   Regards, Robin

## 2022-03-03 NOTE — PROGRESS NOTES
Robin Zhang MD   [facility] - RHEUMATOLOGY  [address] [place] [address]  [phone]      PATIENT NAME: Laurel Mary  : 1975  DATE: 3/3/22  MRN: [id number]      Billing Provider: Robin Zhang MD  Level of Service:   Patient PCP Information     Provider PCP Type    Antonio Wilson MD General          Reason for Visit / Chief Complaint: Joint Pain (C\o of multiple joint pain)       Assessment and Plan (including Health Maintenance)      Problem List  [employer]  Document Outside HM   :  Laurel is a 47 yo female with an unfortunate progressive history of upper and lower extremity weakness with what strikes me as left wrist and right foot drop. She has paresthesias ~ mononeuritis multiplex like etiology.   Patient also has significant muscle weakness likely secondary to neurological weakness and atrophy related to it vs myositis vs CIDP    I am leaning towards vasculitis vs CIDP as etiology for her neuromuscular disorder.     I have ordered an EMG/NCV for Juan. Will also refer patient to Neurology.     Prior MRIs done were not obtained with contrast to look for demyelination.     I am starting her on high dose steroids today and will order for IV pulse solumedrol to be given by infusion 500mg IV x 3 days . PCP procphylaxis started along with high dose steroids.  However, patient needs complete diagnostic workup and will more than likely progress to requiring IVIG therapy.     She needs urgent support and help with treatment as she stands to lose functional capacity further and likely has ongoing immune mediated damage to nerves ~ recent left wrist drop.     I will loop in neurology on this case to expedite diagnostics with EMG/NCV. A nerve biopsy would be useful as well. However should not delay treatment  and will initiate treatment plan with steroids today.    Primary care should review anti-HTN regimen closely.     Plan:     Proximal limb muscle  weakness    Hypermobility arthralgia  -     Ambulatory referral/consult to Rheumatology    Acquired right foot drop    Ulnar nerve damage, initial encounter    Wrist drop, left    Myopathy    CIDP (chronic inflammatory demyelinating polyneuropathy)  -     Hepatitis C Antibody; Future; Expected date: 03/03/2022  -     Hepatitis B Surface Antigen; Future; Expected date: 03/03/2022  -     Hepatitis B Surface Ab, Qualitative; Future; Expected date: 03/03/2022  -     Quantiferon Gold TB; Future; Expected date: 03/03/2022  -     Cyclic Citrullinated Peptide Antibody, IgG; Future; Expected date: 03/03/2022  -     Rheumatoid Quantitative; Future; Expected date: 03/03/2022  -     C-Reactive Protein; Future; Expected date: 03/03/2022  -     Sedimentation Rate; Future; Expected date: 03/03/2022  -     Vasculitis Panel; Future; Expected date: 03/03/2022  -     MERLY EIA w/ Reflex to dsDNA/ISAIAH; Future; Expected date: 03/03/2022  -     ANTI-NEUTROPHILIC CYTOPLASMIC ANTIBODY; Future; Expected date: 03/03/2022  -     Complement, Total; Future; Expected date: 03/03/2022  -     C3 Complement; Future; Expected date: 03/03/2022  -     Immunoglobulins (IgG, IgA, IgM) Quantitative; Future; Expected date: 03/03/2022  -     Immunofixation Electrophoresis; Future; Expected date: 03/03/2022  -     EMG W/ ULTRASOUND AND NERVE CONDUCTION TEST 2 Extremities; Future; Expected date: 03/10/2022  -     Ambulatory referral/consult to Neurology; Future; Expected date: 03/10/2022  -     Protein / creatinine ratio, urine; Future; Expected date: 03/03/2022  -     Immunofixation Electrophoresis, Urine; Future; Expected date: 03/03/2022  -     predniSONE (DELTASONE) 5 MG tablet; Take 4 tablets (20 mg total) by mouth 3 (three) times daily after meals.  Dispense: 500 tablet; Refill: 1  -     Discontinue: sulfamethoxazole-trimethoprim 800-160mg per tablet 1 tablet    Vasculitis  -     Hepatitis C Antibody; Future; Expected date: 03/03/2022  -     Hepatitis B  Surface Antigen; Future; Expected date: 03/03/2022  -     Hepatitis B Surface Ab, Qualitative; Future; Expected date: 03/03/2022  -     Quantiferon Gold TB; Future; Expected date: 03/03/2022  -     Cyclic Citrullinated Peptide Antibody, IgG; Future; Expected date: 03/03/2022  -     Rheumatoid Quantitative; Future; Expected date: 03/03/2022  -     C-Reactive Protein; Future; Expected date: 03/03/2022  -     Sedimentation Rate; Future; Expected date: 03/03/2022  -     Vasculitis Panel; Future; Expected date: 03/03/2022  -     MERLY EIA w/ Reflex to dsDNA/ISAIAH; Future; Expected date: 03/03/2022  -     ANTI-NEUTROPHILIC CYTOPLASMIC ANTIBODY; Future; Expected date: 03/03/2022  -     Complement, Total; Future; Expected date: 03/03/2022  -     C3 Complement; Future; Expected date: 03/03/2022  -     Immunoglobulins (IgG, IgA, IgM) Quantitative; Future; Expected date: 03/03/2022  -     Immunofixation Electrophoresis; Future; Expected date: 03/03/2022  -     EMG W/ ULTRASOUND AND NERVE CONDUCTION TEST 2 Extremities; Future; Expected date: 03/10/2022  -     Ambulatory referral/consult to Neurology; Future; Expected date: 03/10/2022  -     Protein / creatinine ratio, urine; Future; Expected date: 03/03/2022  -     Immunofixation Electrophoresis, Urine; Future; Expected date: 03/03/2022  -     MRI Cervical Spine With Contrast; Future; Expected date: 03/03/2022  -     MRI Lumbar Spine With Contrast; Future; Expected date: 03/03/2022  -     MRI Thoracic Spine With Contrast; Future; Expected date: 03/03/2022  -     predniSONE (DELTASONE) 5 MG tablet; Take 4 tablets (20 mg total) by mouth 3 (three) times daily after meals.  Dispense: 500 tablet; Refill: 1  -     Discontinue: sulfamethoxazole-trimethoprim 800-160mg per tablet 1 tablet    Demyelinating disease of central nervous system, unspecified  -     MRI Cervical Spine With Contrast; Future; Expected date: 03/03/2022  -     MRI Lumbar Spine With Contrast; Future; Expected date:  03/03/2022  -     MRI Thoracic Spine With Contrast; Future; Expected date: 03/03/2022  -     predniSONE (DELTASONE) 5 MG tablet; Take 4 tablets (20 mg total) by mouth 3 (three) times daily after meals.  Dispense: 500 tablet; Refill: 1  -     Discontinue: sulfamethoxazole-trimethoprim 800-160mg per tablet 1 tablet    Other orders  -     sulfamethoxazole-trimethoprim 800-160mg (BACTRIM DS) 800-160 mg Tab; Take 1 tablet by mouth 3 (three) times a week. Take 1 tablet by mouth every Mon, Wed, Fri  Dispense: 36 tablet; Refill: 1       Laurel Mary was given education on their disease process and medications.    An After Visit Summary was printed and given to the patient.    There are other unrelated non-urgent complaints, but due to the busy schedule and the amount of time I've already spent with her, time does not permit me to address these routine issues at today's visit. I've requested another appointment to review these additional issues. Total time spent today ~ 60 minutes.         History of Present Illness / Problem Focused Workflow     Laurel Mary presents to the clinic with Joint Pain (C\o of multiple joint pain)     Hx of voiding issues with now progressive urinary incontinence   New onset left hand  weakness   [facility]    Antonio Wilson MD      Review of Systems     Review of Systems    Medical / Social / Family History     Past Medical History:   Diagnosis Date    Foot drop     Hypertension     Osteoarthritis        History reviewed. No pertinent surgical history.    Social History  Ms. Laurel Mary  reports that she has never smoked. She has never used smokeless tobacco.    Family History  Ms.'s Laurel Mary family history includes Diabetes in her mother; Hypertension in her father and mother; Leukemia in her father.    Medications and Allergies     Medications  Outpatient Medications Marked as Taking for the 3/3/22 encounter (Office Visit) with Robin  MD Leatha   Medication Sig Dispense Refill    losartan (COZAAR) 100 MG tablet Take 1 tablet (100 mg total) by mouth once daily. 90 tablet 3    metoprolol succinate (TOPROL-XL) 50 MG 24 hr tablet Take 1 tablet (50 mg total) by mouth once daily. 30 tablet 11     Current Facility-Administered Medications for the 3/3/22 encounter (Office Visit) with Robin Zhang MD   Medication Dose Route Frequency Provider Last Rate Last Admin    [DISCONTINUED] sulfamethoxazole-trimethoprim 800-160mg per tablet 1 tablet  1 tablet Oral Every Mon, Wed, Fri Robin Zhang MD           Allergies  Review of patient's allergies indicates:  No Known Allergies    Physical Examination     Vitals:    03/03/22 1120   BP: (!) 140/90   Pulse: 99   Resp: 16     Physical Exam  Musculoskeletal:      Left wrist: Deformity present.      Right hip: Deformity and tenderness present. Decreased strength.      Left hip: Decreased strength.      Right foot: Swelling, deformity and Charcot foot present. Abnormal pulse.      Comments: Left wrist drop    Neurological:      Motor: Weakness, atrophy, abnormal muscle tone and pronator drift present.      Gait: Gait abnormal and tandem walk abnormal.      Comments: Patient has Right foot drop and new onset ( 2 months ) left wrist drop.     Left  weakness    No small joint synovitis     Proximal muscle weakness +     Reflexes depressed.                 Signature:  Robin Zhang MD  [facility] - RHEUMATOLOGY  [address] [place] [address]  [phone]    Date of encounter: 3/3/22

## 2022-03-03 NOTE — PATIENT INSTRUCTIONS
Diagnosis suspected after First Rheumatology Evaluation:  CIDP - chronic inflammatory demyelinating Polyneuropathy  OR  Systemic Vasculitis [ mono-neuritis multiplex]       You will need high dose steroids until we can complete diagnostic workup at which point you will likely be maintained on IVIG       Next steps:  -- Blood work [lab]   -- Start Prednisone by mouth [pharmacy ]  -- Start infection prophylaxix - bactrim M, W,F [pharmacy]   -- Wait for Pulse dose IV steroid at Infusion center [infusion center]  -- Eventually after Neurology appointment, after EMG/NCV and after MRI Spine, we can get you on IVIG  -- I will start the pre-cert process for IVIG today but will wait to start after next visit.

## 2022-04-12 ENCOUNTER — OFFICE VISIT (OUTPATIENT)
Dept: FAMILY MEDICINE | Facility: CLINIC | Age: 47
End: 2022-04-12
Payer: OTHER GOVERNMENT

## 2022-04-12 VITALS
SYSTOLIC BLOOD PRESSURE: 150 MMHG | HEIGHT: 66 IN | WEIGHT: 167 LBS | RESPIRATION RATE: 16 BRPM | BODY MASS INDEX: 26.84 KG/M2 | DIASTOLIC BLOOD PRESSURE: 100 MMHG | HEART RATE: 88 BPM

## 2022-04-12 DIAGNOSIS — I10 UNCONTROLLED HYPERTENSION: ICD-10-CM

## 2022-04-12 PROCEDURE — 99213 OFFICE O/P EST LOW 20 MIN: CPT | Mod: ,,, | Performed by: FAMILY MEDICINE

## 2022-04-12 PROCEDURE — 99213 PR OFFICE/OUTPT VISIT, EST, LEVL III, 20-29 MIN: ICD-10-PCS | Mod: ,,, | Performed by: FAMILY MEDICINE

## 2022-04-12 RX ORDER — LOSARTAN POTASSIUM AND HYDROCHLOROTHIAZIDE 25; 100 MG/1; MG/1
1 TABLET ORAL DAILY
Qty: 90 TABLET | Refills: 3 | Status: SHIPPED | OUTPATIENT
Start: 2022-04-12 | End: 2022-11-14

## 2022-04-12 RX ORDER — LOSARTAN POTASSIUM 100 MG/1
100 TABLET ORAL DAILY
Qty: 90 TABLET | Refills: 3 | Status: SHIPPED | OUTPATIENT
Start: 2022-04-12 | End: 2022-04-12

## 2022-04-12 RX ORDER — METOPROLOL SUCCINATE 50 MG/1
50 TABLET, EXTENDED RELEASE ORAL DAILY
Qty: 90 TABLET | Refills: 3 | Status: SHIPPED | OUTPATIENT
Start: 2022-04-12 | End: 2022-08-01

## 2022-04-12 NOTE — PROGRESS NOTES
Antonio Wilson MD        PATIENT NAME: Laurel Mary  : 1975  DATE: 22  MRN: [id number]      Billing Provider: Antonio Wilson MD  Level of Service: AR OFFICE/OUTPT VISIT, EST, LEVL III, 20-29 MIN  Patient PCP Information     Provider PCP Type    Antonio Wilson MD General          Reason for Visit / Chief Complaint: muscle weakness (Go over visit the rheumatology)       Update PCP  Update Chief Complaint         History of Present Illness / Problem Focused Workflow     Laurel Mary presents to the clinic with muscle weakness (Go over visit the rheumatology)     Pt undergoing workup thru Rheumatology .  Needs MRI.  Weakness started after pregnancy at age 17.  Had   And had heavy strep b.        Review of Systems     Review of Systems   Constitutional: Positive for fatigue. Negative for activity change, appetite change, fever and unexpected weight change.   HENT: Negative for congestion, rhinorrhea, sinus pressure, sinus pain, sore throat and trouble swallowing.    Eyes: Negative for photophobia, pain, discharge and visual disturbance.   Respiratory: Negative for cough, chest tightness, wheezing and stridor.    Cardiovascular: Negative for chest pain, palpitations and leg swelling.   Gastrointestinal: Negative for abdominal pain, blood in stool, constipation, diarrhea and nausea.   Endocrine: Negative for polydipsia, polyphagia and polyuria.   Genitourinary: Negative for difficulty urinating, flank pain and hematuria.   Musculoskeletal: Positive for back pain. Negative for arthralgias and neck pain.   Skin: Negative for rash.   Allergic/Immunologic: Negative for food allergies.   Neurological: Positive for numbness. Negative for dizziness, tremors, seizures, syncope, weakness (global weakness) and headaches.   Psychiatric/Behavioral: Negative for behavioral problems, confusion, decreased concentration, dysphoric mood and hallucinations. The patient is not nervous/anxious.         Medical /  Social / Family History     Past Medical History:   Diagnosis Date    CIDP (chronic inflammatory demyelinating polyneuropathy) 3/3/2022    Foot drop     Hypertension     Osteoarthritis        History reviewed. No pertinent surgical history.    Social History  Ms.  reports that she has never smoked. She has never used smokeless tobacco.    Family History  Ms.'s family history includes Diabetes in her mother; Hypertension in her father and mother; Leukemia in her father.    Medications and Allergies     Medications  No outpatient medications have been marked as taking for the 4/12/22 encounter (Office Visit) with Antonio Wilson MD.       Allergies  Review of patient's allergies indicates:  No Known Allergies    Physical Examination     Vitals:    04/12/22 1610   BP: (!) 150/100   Pulse:    Resp:      Physical Exam  Constitutional:       General: She is not in acute distress.     Appearance: Normal appearance.   HENT:      Head: Normocephalic.      Right Ear: Tympanic membrane and ear canal normal.      Left Ear: Tympanic membrane and ear canal normal.      Nose: Nose normal.      Mouth/Throat:      Mouth: Mucous membranes are moist.      Pharynx: No oropharyngeal exudate.   Eyes:      Extraocular Movements: Extraocular movements intact.      Pupils: Pupils are equal, round, and reactive to light.   Cardiovascular:      Rate and Rhythm: Normal rate and regular rhythm.      Heart sounds: No murmur heard.  Pulmonary:      Effort: Pulmonary effort is normal.      Breath sounds: Normal breath sounds. No wheezing.   Abdominal:      General: Abdomen is flat. Bowel sounds are normal.      Palpations: Abdomen is soft.      Hernia: No hernia is present.   Musculoskeletal:         General: Normal range of motion.      Cervical back: Normal range of motion and neck supple.      Right lower leg: No edema.      Left lower leg: No edema.   Lymphadenopathy:      Cervical: No cervical adenopathy.   Skin:     General: Skin is  warm and dry.      Coloration: Skin is not jaundiced.      Findings: No lesion.   Neurological:      General: No focal deficit present.      Mental Status: She is alert and oriented to person, place, and time.      Cranial Nerves: No cranial nerve deficit.      Gait: Gait abnormal.   Psychiatric:         Mood and Affect: Mood normal.         Behavior: Behavior normal.         Judgment: Judgment normal.          Assessment and Plan (including Health Maintenance)      Problem List  Smart Sets  Document Outside HM   :    Plan:   Uncontrolled hypertension  -     Discontinue: losartan (COZAAR) 100 MG tablet; Take 1 tablet (100 mg total) by mouth once daily.  Dispense: 90 tablet; Refill: 3  -     metoprolol succinate (TOPROL-XL) 50 MG 24 hr tablet; Take 1 tablet (50 mg total) by mouth once daily.  Dispense: 90 tablet; Refill: 3  -     losartan-hydrochlorothiazide 100-25 mg (HYZAAR) 100-25 mg per tablet; Take 1 tablet by mouth once daily.  Dispense: 90 tablet; Refill: 3           Health Maintenance Due   Topic Date Due    Cervical Cancer Screening  Never done    Lipid Panel  Never done    COVID-19 Vaccine (1) Never done    HIV Screening  Never done    TETANUS VACCINE  Never done    Mammogram  Never done    Colorectal Cancer Screening  Never done    Influenza Vaccine (1) 09/01/2021       Problem List Items Addressed This Visit    None     Visit Diagnoses     Uncontrolled hypertension        Relevant Medications    metoprolol succinate (TOPROL-XL) 50 MG 24 hr tablet    losartan-hydrochlorothiazide 100-25 mg (HYZAAR) 100-25 mg per tablet          The patient has no Health Maintenance topics of status Not Due    Future Appointments   Date Time Provider Department Center   4/28/2022  9:15 AM [address]   4/28/2022 10:00 AM [facility]   6/13/2022  2:40 PM Robin Zhang MD [facility] RHEU [facility]   7/14/2022 10:30 AM Klaus Davis MD [facility] NEURO [facility]        There are no  Patient Instructions on file for this visit.  Follow up in about 4 weeks (around 5/10/2022) for routine followup.     Signature:  Antonio Wilson MD      Date of encounter: 4/12/22

## 2022-04-14 ENCOUNTER — OFFICE VISIT (OUTPATIENT)
Dept: NEUROLOGY | Facility: CLINIC | Age: 47
End: 2022-04-14
Payer: OTHER GOVERNMENT

## 2022-04-14 VITALS
HEIGHT: 66 IN | SYSTOLIC BLOOD PRESSURE: 140 MMHG | RESPIRATION RATE: 18 BRPM | DIASTOLIC BLOOD PRESSURE: 100 MMHG | WEIGHT: 164.19 LBS | OXYGEN SATURATION: 99 % | HEART RATE: 100 BPM | BODY MASS INDEX: 26.39 KG/M2

## 2022-04-14 DIAGNOSIS — I77.6 VASCULITIS: ICD-10-CM

## 2022-04-14 DIAGNOSIS — G61.81 CIDP (CHRONIC INFLAMMATORY DEMYELINATING POLYNEUROPATHY): Primary | ICD-10-CM

## 2022-04-14 DIAGNOSIS — R29.898 WEAKNESS OF RIGHT LOWER EXTREMITY: ICD-10-CM

## 2022-04-14 DIAGNOSIS — M54.16 LUMBAR RADICULOPATHY, CHRONIC: ICD-10-CM

## 2022-04-14 PROCEDURE — 99203 PR OFFICE/OUTPT VISIT, NEW, LEVL III, 30-44 MIN: ICD-10-PCS | Mod: S$PBB,,, | Performed by: PSYCHIATRY & NEUROLOGY

## 2022-04-14 PROCEDURE — 99203 OFFICE O/P NEW LOW 30 MIN: CPT | Mod: S$PBB,,, | Performed by: PSYCHIATRY & NEUROLOGY

## 2022-04-14 PROCEDURE — 99215 OFFICE O/P EST HI 40 MIN: CPT | Mod: PBBFAC | Performed by: PSYCHIATRY & NEUROLOGY

## 2022-04-14 NOTE — PATIENT INSTRUCTIONS
MRI brain r/o demyelination   NCV/emg LE's -[facility] [place] w/ Dr Lynch   Stress / anxiety reduction
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
## 2022-05-29 ENCOUNTER — TELEPHONE (OUTPATIENT)
Dept: EMERGENCY MEDICINE | Facility: HOSPITAL | Age: 47
End: 2022-05-29
Payer: OTHER GOVERNMENT

## 2022-05-31 ENCOUNTER — OFFICE VISIT (OUTPATIENT)
Dept: FAMILY MEDICINE | Facility: CLINIC | Age: 47
End: 2022-05-31
Payer: OTHER GOVERNMENT

## 2022-05-31 VITALS
BODY MASS INDEX: 27.06 KG/M2 | DIASTOLIC BLOOD PRESSURE: 90 MMHG | RESPIRATION RATE: 18 BRPM | WEIGHT: 168.38 LBS | SYSTOLIC BLOOD PRESSURE: 160 MMHG | HEIGHT: 66 IN | OXYGEN SATURATION: 99 % | HEART RATE: 92 BPM

## 2022-05-31 DIAGNOSIS — G61.81 CIDP (CHRONIC INFLAMMATORY DEMYELINATING POLYNEUROPATHY): ICD-10-CM

## 2022-05-31 DIAGNOSIS — R27.0 ATAXIA: ICD-10-CM

## 2022-05-31 DIAGNOSIS — R29.898 WEAKNESS OF RIGHT LOWER EXTREMITY: Primary | ICD-10-CM

## 2022-05-31 DIAGNOSIS — M21.332 WRIST DROP, LEFT: ICD-10-CM

## 2022-05-31 PROCEDURE — 99213 OFFICE O/P EST LOW 20 MIN: CPT | Mod: ,,, | Performed by: FAMILY MEDICINE

## 2022-05-31 PROCEDURE — 99213 PR OFFICE/OUTPT VISIT, EST, LEVL III, 20-29 MIN: ICD-10-PCS | Mod: ,,, | Performed by: FAMILY MEDICINE

## 2022-05-31 NOTE — PROGRESS NOTES
Antonio Wilson MD        PATIENT NAME: Laurel Mary  : 1975  DATE: 22  MRN: [id number]      Billing Provider: Antonio Wilson MD  Level of Service: LA OFFICE/OUTPT VISIT, EST, LEVL III, 20-29 MIN  Patient PCP Information     Provider PCP Type    Antonio Wilson MD General          Reason for Visit / Chief Complaint: Follow-up (Hospital discharge on ) and Hypertension       Update PCP  Update Chief Complaint         History of Present Illness / Problem Focused Workflow     Laurel Mary presents to the clinic with Follow-up (Hospital discharge on ) and Hypertension     Routine followup.  [facility].  Admitted and released.  Local MRI's cancelled.  Seeing Dr. Davis and Dr. Vasques.  To see neurology on [place] for nerve conduction.      Follow-up  Associated symptoms include numbness and weakness (global weakness). Pertinent negatives include no abdominal pain, arthralgias, chest pain, congestion, coughing, fever, headaches, nausea, neck pain, rash or sore throat.   Hypertension  Pertinent negatives include no chest pain, headaches, neck pain or palpitations.       Review of Systems     Review of Systems   Constitutional: Negative for activity change, appetite change, fever and unexpected weight change.   HENT: Negative for congestion, rhinorrhea, sinus pressure, sinus pain, sore throat and trouble swallowing.    Eyes: Negative for photophobia, pain, discharge and visual disturbance.   Respiratory: Negative for cough, chest tightness, wheezing and stridor.    Cardiovascular: Negative for chest pain, palpitations and leg swelling.   Gastrointestinal: Negative for abdominal pain, blood in stool, constipation, diarrhea and nausea.   Endocrine: Negative for polydipsia, polyphagia and polyuria.   Genitourinary: Negative for difficulty urinating, flank pain and hematuria.   Musculoskeletal: Negative for arthralgias and neck pain.   Skin: Negative for rash.   Allergic/Immunologic:  Negative for food allergies.   Neurological: Positive for weakness (global weakness) and numbness. Negative for dizziness, tremors, seizures, syncope and headaches.   Psychiatric/Behavioral: Negative for behavioral problems, confusion, decreased concentration, dysphoric mood and hallucinations. The patient is not nervous/anxious.         Medical / Social / Family History     Past Medical History:   Diagnosis Date    CIDP (chronic inflammatory demyelinating polyneuropathy) 3/3/2022    Foot drop     Hypertension     Osteoarthritis        History reviewed. No pertinent surgical history.    Social History  Ms.  reports that she has never smoked. She has never used smokeless tobacco.    Family History  Ms.'s family history includes Diabetes in her mother; Hypertension in her father and mother; Leukemia in her father.    Medications and Allergies     Medications  No outpatient medications have been marked as taking for the 5/31/22 encounter (Office Visit) with Antonio Wilson MD.       Allergies  Review of patient's allergies indicates:  No Known Allergies    Physical Examination     Vitals:    05/31/22 0855   BP: (!) 160/90   Pulse: 92   Resp: 18     Physical Exam  Constitutional:       General: She is not in acute distress.     Appearance: Normal appearance.   HENT:      Head: Normocephalic.      Right Ear: Tympanic membrane and ear canal normal.      Left Ear: Tympanic membrane and ear canal normal.      Nose: Nose normal.      Mouth/Throat:      Mouth: Mucous membranes are moist.      Pharynx: No oropharyngeal exudate.   Eyes:      Extraocular Movements: Extraocular movements intact.      Pupils: Pupils are equal, round, and reactive to light.   Cardiovascular:      Rate and Rhythm: Normal rate and regular rhythm.      Heart sounds: No murmur heard.  Pulmonary:      Effort: Pulmonary effort is normal.      Breath sounds: Normal breath sounds. No wheezing.   Abdominal:      General: Abdomen is flat. Bowel sounds  are normal.      Palpations: Abdomen is soft.      Hernia: No hernia is present.   Musculoskeletal:         General: Normal range of motion.      Cervical back: Normal range of motion and neck supple.      Right lower leg: No edema.      Left lower leg: No edema.   Lymphadenopathy:      Cervical: No cervical adenopathy.   Skin:     General: Skin is warm and dry.      Coloration: Skin is not jaundiced.      Findings: No lesion.   Neurological:      General: No focal deficit present.      Mental Status: She is alert and oriented to person, place, and time. Mental status is at baseline.      Cranial Nerves: No cranial nerve deficit.      Gait: Gait normal.   Psychiatric:         Mood and Affect: Mood normal.         Behavior: Behavior normal.         Judgment: Judgment normal.          Assessment and Plan (including Health Maintenance)      Problem List  Smart Sets  Document Outside HM   :    Plan:   Weakness of right lower extremity  -     MRA Brain with and without contrast; Future; Expected date: 06/01/2022    Ataxia  -     MRI Cervical Spine W WO Cont; Future; Expected date: 05/31/2022    Wrist drop, left    CIDP (chronic inflammatory demyelinating polyneuropathy)           Health Maintenance Due   Topic Date Due    Cervical Cancer Screening  Never done    Lipid Panel  Never done    COVID-19 Vaccine (1) Never done    HIV Screening  Never done    TETANUS VACCINE  Never done    Mammogram  Never done       Problem List Items Addressed This Visit        Neuro    Wrist drop, left    CIDP (chronic inflammatory demyelinating polyneuropathy)      Other Visit Diagnoses     Weakness of right lower extremity    -  Primary    Relevant Orders    MRA Brain with and without contrast    Ataxia        Relevant Orders    MRI Cervical Spine W WO Cont          Health Maintenance Topics with due status: Not Due       Topic Last Completion Date    Influenza Vaccine 01/22/2020    Colorectal Cancer Screening 10/01/2021       Future  Appointments   Date Time Provider Department Center   6/13/2022  2:40 PM Robin Zhang MD [facility] RHEU [facility]   6/14/2022  1:45 PM [facility]   6/14/2022  2:30 PM [facility]   7/14/2022 10:30 AM Klaus Davis MD [facility] NEURO [facility]        There are no Patient Instructions on file for this visit.  Follow up if symptoms worsen or fail to improve.     Signature:  Antonio Wilson MD      Date of encounter: 5/31/22

## 2022-06-02 LAB — PAP RECOMMENDATION EXT: NORMAL

## 2022-06-13 ENCOUNTER — OFFICE VISIT (OUTPATIENT)
Dept: RHEUMATOLOGY | Facility: CLINIC | Age: 47
End: 2022-06-13
Payer: OTHER GOVERNMENT

## 2022-06-13 VITALS
HEART RATE: 93 BPM | OXYGEN SATURATION: 98 % | DIASTOLIC BLOOD PRESSURE: 100 MMHG | SYSTOLIC BLOOD PRESSURE: 152 MMHG | RESPIRATION RATE: 16 BRPM

## 2022-06-13 DIAGNOSIS — G61.81 CIDP (CHRONIC INFLAMMATORY DEMYELINATING POLYNEUROPATHY): Primary | ICD-10-CM

## 2022-06-13 DIAGNOSIS — G93.40 ENCEPHALOPATHY: ICD-10-CM

## 2022-06-13 PROCEDURE — 99213 OFFICE O/P EST LOW 20 MIN: CPT | Mod: PBBFAC | Performed by: INTERNAL MEDICINE

## 2022-06-13 PROCEDURE — 99214 PR OFFICE/OUTPT VISIT, EST, LEVL IV, 30-39 MIN: ICD-10-PCS | Mod: S$PBB,,, | Performed by: INTERNAL MEDICINE

## 2022-06-13 PROCEDURE — 99214 OFFICE O/P EST MOD 30 MIN: CPT | Mod: S$PBB,,, | Performed by: INTERNAL MEDICINE

## 2022-06-13 RX ORDER — PREDNISONE 10 MG/1
20 TABLET ORAL DAILY
Qty: 100 TABLET | Refills: 0 | Status: SHIPPED | OUTPATIENT
Start: 2022-06-13 | End: 2022-08-01

## 2022-06-13 NOTE — PROGRESS NOTES
Robin Zhang MD   [facility] - RHEUMATOLOGY  [address] [address]  [phone]      PATIENT NAME: Laurel Mary  : 1975  DATE: 22  MRN: [id number]      Billing Provider: Robin Zhang MD  Level of Service:   Patient PCP Information     Provider PCP Type    Antonio Wilson MD General          Reason for Visit / Chief Complaint: Follow-up (Follow up muscle weakness in legs)       Assessment and Plan (including Health Maintenance)      Problem List  Smart Sets  Document Outside HM   :  Laurel is a 45 yo female with an unfortunate progressive history of upper and lower extremity weakness with what strikes me as left wrist and right foot drop. She has paresthesias ~ mononeuritis multiplex like etiology.   Patient also has significant muscle weakness likely secondary to neurological weakness and atrophy related to it vs myositis vs CIDP    I am leaning towards vasculitis vs CIDP as etiology for her neuromuscular disorder.     I have ordered an EMG/NCV for Juan. Will also refer patient to Neurology.     Prior MRIs done were not obtained with contrast to look for demyelination.     I am starting her on high dose steroids today and will order for IV pulse solumedrol to be given by infusion 500mg IV x 3 days . PCP procphylaxis started along with high dose steroids.  However, patient needs complete diagnostic workup and will more than likely progress to requiring IVIG therapy.     She needs urgent support and help with treatment as she stands to lose functional capacity further and likely has ongoing immune mediated damage to nerves ~ recent left wrist drop.     I will loop in neurology on this case to expedite diagnostics with EMG/NCV. A nerve biopsy would be useful as well. However should not delay treatment  and will initiate treatment plan with steroids today.    Primary care should review anti-HTN regimen closely.     2022;   Ms. Mary is here  for follow-up.  In the interim she had a severe attack of dysphagia for which she was seen at [facility] and had an MRI of her brain done.  MRI with and without contrast of the brain has shown patchy and confluent foci of T2 hyper sensitivity predominantly the periventricular white matter.  This would be consistent with disease condition such as MS or the more rare if condition of CADASIL.  She has been seen by on neurologist at [facility] Dr. Davis.   She reports her symptoms starting after late trimester  which led to complications of pelvic inflammatory disease and endometriosis.  Patient will need to be seen by [facility] specialists Dr. Kinney who is considering a referral to an MS specialist as well.  I agree with this plan.  It might be worth starting patient on 10 mg of prednisone just to see if it helps with some of her symptoms of brain fog.  I doubt if her peripheral neuropathy but the symptoms would be helped.  EMG nerve conduction study is largely normal.  And Dr. Davis did not appreciate the foot and wrist drop that I had noticed at our 1st visit.  So she has relapsing remitting symptoms.      ICD-10-CM ICD-9-CM    1. CIDP (chronic inflammatory demyelinating polyneuropathy)  G61.81 357.81 predniSONE (DELTASONE) 10 MG tablet   2. Encephalopathy  G93.40 348.30 predniSONE (DELTASONE) 10 MG tablet             History of Present Illness / Problem Focused Workflow     Laurel Mary presents to the clinic with Follow-up (Follow up muscle weakness in legs)     Hx of voiding issues with now progressive urinary incontinence   New onset left hand  weakness   [facility]    Antonio Wilson MD      Review of Systems     Review of Systems    Medical / Social / Family History     Past Medical History:   Diagnosis Date    CIDP (chronic inflammatory demyelinating polyneuropathy) 3/3/2022    Foot drop     Hypertension     Osteoarthritis        History reviewed. No pertinent surgical  history.    Social History  Ms. Laurel Mary  reports that she has never smoked. She has never used smokeless tobacco.    Family History  Ms.'s Laurel Mary family history includes Diabetes in her mother; Hypertension in her father and mother; Leukemia in her father.    Medications and Allergies     Medications  Outpatient Medications Marked as Taking for the 6/13/22 encounter (Office Visit) with Robin Zhang MD   Medication Sig Dispense Refill    losartan-hydrochlorothiazide 100-25 mg (HYZAAR) 100-25 mg per tablet Take 1 tablet by mouth once daily. 90 tablet 3    metoprolol succinate (TOPROL-XL) 50 MG 24 hr tablet Take 1 tablet (50 mg total) by mouth once daily. 90 tablet 3       Allergies  Review of patient's allergies indicates:  No Known Allergies    Physical Examination     Vitals:    06/13/22 1502   BP: (!) 152/100   Pulse: 93   Resp: 16     Physical Exam  Musculoskeletal:      Left wrist: Deformity present.      Right hip: Deformity and tenderness present. Decreased strength.      Left hip: Decreased strength.      Right foot: Swelling, deformity and Charcot foot present. Abnormal pulse.      Comments: Left wrist drop    Neurological:      Motor: Weakness, atrophy, abnormal muscle tone and pronator drift present.      Gait: Gait abnormal and tandem walk abnormal.      Comments: Patient has Right foot drop and new onset ( 2 months ) left wrist drop.     Left  weakness    No small joint synovitis     Proximal muscle weakness +     Reflexes depressed.                 Signature:  Robin Zhang MD  [facility] - RHEUMATOLOGY  [address] [place] [address] [place] [address]  [phone]    Date of encounter: 6/13/22

## 2022-06-14 PROBLEM — G93.40 ENCEPHALOPATHY: Status: ACTIVE | Noted: 2022-06-14

## 2022-06-20 ENCOUNTER — OFFICE VISIT (OUTPATIENT)
Dept: NEUROLOGY | Facility: CLINIC | Age: 47
End: 2022-06-20
Payer: OTHER GOVERNMENT

## 2022-06-20 VITALS
BODY MASS INDEX: 26.84 KG/M2 | OXYGEN SATURATION: 97 % | HEIGHT: 66 IN | SYSTOLIC BLOOD PRESSURE: 138 MMHG | DIASTOLIC BLOOD PRESSURE: 90 MMHG | WEIGHT: 167 LBS | HEART RATE: 96 BPM

## 2022-06-20 DIAGNOSIS — G37.9 DEMYELINATING CHANGES IN BRAIN: Primary | ICD-10-CM

## 2022-06-20 PROCEDURE — 99214 PR OFFICE/OUTPT VISIT, EST, LEVL IV, 30-39 MIN: ICD-10-PCS | Mod: S$PBB,,, | Performed by: PSYCHIATRY & NEUROLOGY

## 2022-06-20 PROCEDURE — 99213 OFFICE O/P EST LOW 20 MIN: CPT | Mod: PBBFAC | Performed by: PSYCHIATRY & NEUROLOGY

## 2022-06-20 PROCEDURE — 99214 OFFICE O/P EST MOD 30 MIN: CPT | Mod: S$PBB,,, | Performed by: PSYCHIATRY & NEUROLOGY

## 2022-06-20 RX ORDER — BACLOFEN 10 MG/1
TABLET ORAL
Qty: 270 TABLET | Refills: 3 | Status: SHIPPED | OUTPATIENT
Start: 2022-06-20 | End: 2022-08-01

## 2022-06-20 NOTE — PATIENT INSTRUCTIONS
Needs LP to r/o MULTIPLE SCLEROSIS for oligoclonal bands and myelin basic protein   Stress reduction techniques   Trial Baclofen tid prn

## 2022-06-20 NOTE — PROGRESS NOTES
Subjective:       Patient ID: Laurel Mary is a 46 y.o. female     Chief Complaint:    Chief Complaint   Patient presents with    Follow-up        Allergies:  Patient has no known allergies.    Current Medications:    Outpatient Encounter Medications as of 6/20/2022   Medication Sig Dispense Refill    aspirin/salicylamide/caffeine (BC HEADACHE POWDER ORAL) Take by mouth.      losartan-hydrochlorothiazide 100-25 mg (HYZAAR) 100-25 mg per tablet Take 1 tablet by mouth once daily. 90 tablet 3    metoprolol succinate (TOPROL-XL) 50 MG 24 hr tablet Take 1 tablet (50 mg total) by mouth once daily. 90 tablet 3    predniSONE (DELTASONE) 10 MG tablet Take 2 tablets (20 mg total) by mouth once daily. 100 tablet 0     No facility-administered encounter medications on file as of 6/20/2022.       History of Present Illness  45 yo BF w/ mult somatic complaints and in f/u for LE weakness and paresthesias and spasms - recent all dx NCV/EMG per Dr Lynch completely normal   Recent MRI brain in [place] showed PERIVENTRICULAR WHITE MATTER lesions and T2 hyperintensities and diff dx demyelination, MULTIPLE SCLEROSIS, CADASIL or Lyme?  She did not complete the full w/u in [place] as they recommended b/c she had to leave back to MULTIPLE SCLEROSIS with her autistic child   Will get spinal LP to assess CSF for oligoclonal bands and myelin basic protein       Past Medical History:   Diagnosis Date    CIDP (chronic inflammatory demyelinating polyneuropathy) 3/3/2022    Foot drop     Hypertension     Osteoarthritis        History reviewed. No pertinent surgical history.    Social History  Ms. Mary  reports that she has never smoked. She has never used smokeless tobacco. She reports current alcohol use. She reports that she does not use drugs.    Family History  Ms.'s Mary family history includes Diabetes in her mother; Hypertension in her father and mother; Leukemia in her father.    Review of Systems  Review of  "Systems   Neurological: Positive for tingling and sensory change.   Psychiatric/Behavioral: Positive for depression. The patient is nervous/anxious and has insomnia.       Objective:   BP (!) 138/90 (BP Location: Left arm, Patient Position: Sitting, BP Method: Large (Manual))   Pulse 96   Ht 5' 6" (1.676 m)   Wt 75.8 kg (167 lb)   LMP 06/06/2022 (Within Days)   SpO2 97%   BMI 26.95 kg/m²    NEUROLOGICAL EXAMINATION:     MENTAL STATUS   Oriented to person, place, and time.   Level of consciousness: alert       Pressured speech  Anxiety  PTSD from childhood and  issues     CRANIAL NERVES   Cranial nerves II through XII intact.     MOTOR EXAM   Muscle bulk: normal  Overall muscle tone: normal    Strength   Strength 5/5 throughout.     REFLEXES     Reflexes   Right brachioradialis: 2+  Left brachioradialis: 2+  Right biceps: 2+  Left biceps: 2+  Right triceps: 2+  Left triceps: 2+  Right patellar: 2+  Left patellar: 2+  Right achilles: 2+  Left achilles: 2+  Right plantar: normal  Left plantar: normal    SENSORY EXAM   Light touch normal.   Vibration normal.     GAIT AND COORDINATION     Gait  Gait: normal       Physical Exam  Vitals reviewed.   Neurological:      Mental Status: She is oriented to person, place, and time. Mental status is at baseline.      Cranial Nerves: Cranial nerves 2-12 are intact.      Sensory: Sensory deficit present.      Motor: Motor strength is normal.      Gait: Gait is intact.      Deep Tendon Reflexes:      Reflex Scores:       Tricep reflexes are 2+ on the right side and 2+ on the left side.       Bicep reflexes are 2+ on the right side and 2+ on the left side.       Brachioradialis reflexes are 2+ on the right side and 2+ on the left side.       Patellar reflexes are 2+ on the right side and 2+ on the left side.       Achilles reflexes are 2+ on the right side and 2+ on the left side.         Assessment:     Demyelinating changes in brain  Comments:  recent MRI brain poss " MS- nees LP to r/o oligoclonal bands and myeling basic protein         Primary Diagnosis and ICD10  Demyelinating changes in brain [G37.9]    Plan:     Patient Instructions   Needs LP to r/o MULTIPLE SCLEROSIS for oligoclonal bands and myelin basic protein   Stress reduction techniques   Trial Baclofen tid prn       There are no discontinued medications.    Requested Prescriptions      No prescriptions requested or ordered in this encounter

## 2022-07-07 ENCOUNTER — TELEPHONE (OUTPATIENT)
Dept: NEUROLOGY | Facility: CLINIC | Age: 47
End: 2022-07-07
Payer: OTHER GOVERNMENT

## 2022-07-07 PROCEDURE — 85730 THROMBOPLASTIN TIME PARTIAL: CPT | Performed by: PSYCHIATRY & NEUROLOGY

## 2022-07-07 NOTE — TELEPHONE ENCOUNTER
Pt calls to reschedule lumbar puncture. Informed her that lumbar puncture has been rescheduled to 7/11/2022 at 8:00 am. Instructed her to have nothing to eat or drink after midnight.  She is to check in at Admissions on the 1st floor of the Ambulatory Bldg by 7:45 am. Pt states her son is available to drive her. Patient  voiced understanding by verbal return .

## 2022-07-10 ENCOUNTER — HOSPITAL ENCOUNTER (EMERGENCY)
Facility: HOSPITAL | Age: 47
Discharge: HOME | End: 2022-07-10
Attending: FAMILY MEDICINE
Payer: OTHER GOVERNMENT

## 2022-07-10 VITALS
HEIGHT: 66 IN | SYSTOLIC BLOOD PRESSURE: 152 MMHG | RESPIRATION RATE: 15 BRPM | BODY MASS INDEX: 27 KG/M2 | WEIGHT: 168 LBS | OXYGEN SATURATION: 99 % | HEART RATE: 80 BPM | TEMPERATURE: 98 F | DIASTOLIC BLOOD PRESSURE: 97 MMHG

## 2022-07-10 DIAGNOSIS — D50.9 IRON DEFICIENCY ANEMIA, UNSPECIFIED IRON DEFICIENCY ANEMIA TYPE: Primary | ICD-10-CM

## 2022-07-10 LAB
BASOPHILS # BLD AUTO: 0.02 K/UL (ref 0–0.2)
BASOPHILS NFR BLD AUTO: 0.3 % (ref 0–1)
DIFFERENTIAL METHOD BLD: ABNORMAL
EOSINOPHIL # BLD AUTO: 0.05 K/UL (ref 0–0.5)
EOSINOPHIL NFR BLD AUTO: 0.9 % (ref 1–4)
ERYTHROCYTE [DISTWIDTH] IN BLOOD BY AUTOMATED COUNT: 19.2 % (ref 11.5–14.5)
FERRITIN SERPL-MCNC: 12 NG/ML (ref 8–252)
FOLATE SERPL-MCNC: 6.7 NG/ML (ref 3.1–17.5)
HCT VFR BLD AUTO: 28.9 % (ref 38–47)
HGB BLD-MCNC: 8.8 G/DL (ref 12–16)
IMM GRANULOCYTES # BLD AUTO: 0.02 K/UL (ref 0–0.04)
IMM GRANULOCYTES NFR BLD: 0.3 % (ref 0–0.4)
IRON SATN MFR SERPL: 5 % (ref 14–50)
IRON SERPL-MCNC: 28 ΜG/DL (ref 50–170)
LYMPHOCYTES # BLD AUTO: 1.77 K/UL (ref 1–4.8)
LYMPHOCYTES NFR BLD AUTO: 30.1 % (ref 27–41)
MCH RBC QN AUTO: 23.3 PG (ref 27–31)
MCHC RBC AUTO-ENTMCNC: 30.4 G/DL (ref 32–36)
MCV RBC AUTO: 76.7 FL (ref 80–96)
MONOCYTES # BLD AUTO: 0.67 K/UL (ref 0–0.8)
MONOCYTES NFR BLD AUTO: 11.4 % (ref 2–6)
MPC BLD CALC-MCNC: 8.2 FL (ref 9.4–12.4)
NEUTROPHILS # BLD AUTO: 3.35 K/UL (ref 1.8–7.7)
NEUTROPHILS NFR BLD AUTO: 57 % (ref 53–65)
NRBC # BLD AUTO: 0 X10E3/UL
NRBC, AUTO (.00): 0 %
PLATELET # BLD AUTO: 500 K/UL (ref 150–400)
RBC # BLD AUTO: 3.77 M/UL (ref 4.2–5.4)
TIBC SERPL-MCNC: 531 ΜG/DL (ref 250–450)
VIT B12 SERPL-MCNC: 374 PG/ML (ref 193–986)
WBC # BLD AUTO: 5.88 K/UL (ref 4.5–11)

## 2022-07-10 PROCEDURE — 85025 COMPLETE CBC W/AUTO DIFF WBC: CPT | Performed by: FAMILY MEDICINE

## 2022-07-10 PROCEDURE — 99283 EMERGENCY DEPT VISIT LOW MDM: CPT

## 2022-07-10 PROCEDURE — 99283 EMERGENCY DEPT VISIT LOW MDM: CPT | Mod: ,,, | Performed by: FAMILY MEDICINE

## 2022-07-10 PROCEDURE — 99283 PR EMERGENCY DEPT VISIT,LEVEL III: ICD-10-PCS | Mod: ,,, | Performed by: FAMILY MEDICINE

## 2022-07-10 PROCEDURE — 36415 COLL VENOUS BLD VENIPUNCTURE: CPT | Performed by: FAMILY MEDICINE

## 2022-07-10 PROCEDURE — 36415 COLL VENOUS BLD VENIPUNCTURE: CPT

## 2022-07-10 PROCEDURE — 82728 ASSAY OF FERRITIN: CPT | Performed by: FAMILY MEDICINE

## 2022-07-10 PROCEDURE — 82746 ASSAY OF FOLIC ACID SERUM: CPT | Performed by: FAMILY MEDICINE

## 2022-07-10 PROCEDURE — 83540 ASSAY OF IRON: CPT | Performed by: FAMILY MEDICINE

## 2022-07-10 RX ORDER — FERROUS SULFATE 325(65) MG
325 TABLET, DELAYED RELEASE (ENTERIC COATED) ORAL DAILY
Qty: 30 TABLET | Refills: 0 | Status: SHIPPED | OUTPATIENT
Start: 2022-07-10 | End: 2022-08-01

## 2022-07-10 NOTE — ED TRIAGE NOTES
Presents to the ED stating that Dr. Davis called her on 07/07/2022 telling her that her H&H was low and since she is scheduled for a lumbar puncture tomorrow, 07/11/2022, that she needed to come to ED for possible blood transfusion. Patient reports fatigue, but reports that it has been going on for a while. Denies heavy bleeding with menstruation.

## 2022-07-10 NOTE — DISCHARGE INSTRUCTIONS
Take your medications as prescribed. Follow up with your primary care provider if your symptoms to not improve, or return to the ED for new or worsening symptoms.  Make sure to follow up for your LP tomorrow.

## 2022-07-10 NOTE — ED PROVIDER NOTES
Encounter Date: 7/10/2022       History     Chief Complaint   Patient presents with    Weakness     Patient is a 46-year-old  female, presents emergency department for weakness.  Patient has had chronic neurologic complaints that she is currently being worked up by Dr. Zhang, from Rheumatology, and Dr. Davis from Neurology.  She is supposed to have an LP tomorrow.  At her previous visit with Dr. Davis, she had blood work done and was told via voicemail that she is anemic and should follow up with primary care provider because she may need a blood transfusion.  [facility] to [facility] because she may need blood before they do the LP tomorrow.  She does not had any change in her symptoms recently.  She denies any heavy menses.  She has seen an OBGYN and been cleared from that perspective.  She has not had any rectal bleeding.  She had a colonoscopy last year and was told she has diverticulosis without diverticulitis.        Review of patient's allergies indicates:  No Known Allergies  Past Medical History:   Diagnosis Date    CIDP (chronic inflammatory demyelinating polyneuropathy) 3/3/2022    Foot drop     Hypertension     Osteoarthritis      Past Surgical History:   Procedure Laterality Date    ENDOMETRIAL BIOPSY       Family History   Problem Relation Age of Onset    Hypertension Mother     Diabetes Mother     Hypertension Father     Leukemia Father      Social History     Tobacco Use    Smoking status: Never Smoker    Smokeless tobacco: Never Used   Substance Use Topics    Alcohol use: Yes     Comment: occ    Drug use: Never     Review of Systems   Constitutional: Negative for fever.   Neurological: Negative for facial asymmetry.   All other systems reviewed and are negative.      Physical Exam     Initial Vitals [07/10/22 0938]   BP Pulse Resp Temp SpO2   (!) 173/110 89 18 98.3 °F (36.8 °C) 100 %      MAP       --          Physical Exam    Vitals reviewed.  Constitutional: She appears well-developed and well-nourished.   HENT:   Head: Normocephalic.   Eyes: Pupils are equal, round, and reactive to light.   Cardiovascular: Normal rate, regular rhythm and normal heart sounds.   Pulmonary/Chest: Breath sounds normal. No respiratory distress. She has no wheezes. She has no rales.   Abdominal: Abdomen is soft. Bowel sounds are normal. She exhibits no distension. There is no abdominal tenderness.     Neurological: She is alert and oriented to person, place, and time.   Psychiatric: She has a normal mood and affect.         Medical Screening Exam   See Full Note    ED Course   Procedures  Labs Reviewed   IRON AND TIBC - Abnormal; Notable for the following components:       Result Value    Iron 28 (*)     Iron Saturation 5 (*)     TIBC 531 (*)     All other components within normal limits   CBC WITH DIFFERENTIAL - Abnormal; Notable for the following components:    RBC 3.77 (*)     Hemoglobin 8.8 (*)     Hematocrit 28.9 (*)     MCV 76.7 (*)     MCH 23.3 (*)     MCHC 30.4 (*)     RDW 19.2 (*)     Platelet Count 500 (*)     MPV 8.2 (*)     Monocytes % 11.4 (*)     Eosinophils % 0.9 (*)     All other components within normal limits   VITAMIN B12/FOLATE, SERUM PANEL - Normal   FERRITIN - Normal   CBC W/ AUTO DIFFERENTIAL    Narrative:     The following orders were created for panel order CBC auto differential.  Procedure                               Abnormality         Status                     ---------                               -----------         ------                     CBC with Differential[[id number]]        Abnormal            Final result                 Please view results for these tests on the individual orders.   EXTRA TUBES    Narrative:     The following orders were created for panel order EXTRA TUBES.  Procedure                               Abnormality         Status                     ---------                                -----------         ------                     Light Blue Top Hold[[id number]]                              In process                 Red Top Hold[[id number]]                                     In process                   Please view results for these tests on the individual orders.   LIGHT BLUE TOP HOLD   RED TOP HOLD          Imaging Results    None          Medications - No data to display                    Clinical Impression:   Final diagnoses:  [D50.9] Iron deficiency anemia, unspecified iron deficiency anemia type (Primary)          ED Disposition Condition    Discharge Stable        ED Prescriptions     Medication Sig Dispense Start Date End Date Auth. Provider    ferrous sulfate 325 (65 FE) MG EC tablet Take 1 tablet (325 mg total) by mouth once daily. 30 tablet 7/10/2022  Leela Chinchilla MD        Follow-up Information    None          Leela Chinchilla MD  07/10/22 5133

## 2022-07-11 ENCOUNTER — TELEPHONE (OUTPATIENT)
Dept: EMERGENCY MEDICINE | Facility: HOSPITAL | Age: 47
End: 2022-07-11
Payer: OTHER GOVERNMENT

## 2022-07-11 ENCOUNTER — HOSPITAL ENCOUNTER (OUTPATIENT)
Dept: RADIOLOGY | Facility: HOSPITAL | Age: 47
Discharge: HOME | End: 2022-07-11
Attending: PSYCHIATRY & NEUROLOGY
Payer: OTHER GOVERNMENT

## 2022-07-11 VITALS
RESPIRATION RATE: 16 BRPM | HEART RATE: 92 BPM | WEIGHT: 168 LBS | DIASTOLIC BLOOD PRESSURE: 87 MMHG | BODY MASS INDEX: 27.12 KG/M2 | TEMPERATURE: 98 F | OXYGEN SATURATION: 99 % | SYSTOLIC BLOOD PRESSURE: 135 MMHG

## 2022-07-11 DIAGNOSIS — G37.9 DEMYELINATING CHANGES IN BRAIN: ICD-10-CM

## 2022-07-11 LAB
ALBUMIN SERPL BCP-MCNC: 3.5 G/DL (ref 3.5–5)
IGG SERPL-MCNC: 1480 MG/DL (ref 767–1590)

## 2022-07-11 PROCEDURE — 62328 DX LMBR SPI PNXR W/FLUOR/CT: CPT

## 2022-07-11 PROCEDURE — 82040 ASSAY OF SERUM ALBUMIN: CPT | Performed by: PSYCHIATRY & NEUROLOGY

## 2022-07-11 PROCEDURE — 82784 ASSAY IGA/IGD/IGG/IGM EACH: CPT | Performed by: PSYCHIATRY & NEUROLOGY

## 2022-07-11 NOTE — PROGRESS NOTES
Lumbar puncture  Performed by [facility]  H&P has been reviewed, lumbar puncture for possible MS.  Labs are within normal limits.  The procedure was explained to the patient including risks and possible complications.  Patient agreed to procedure the consent is signed.  A formal timeout was called all staff present agreed to patient and procedure.  The lower back was prepped with Betadine and sterile field was established.  1% lidocaine was used as local anesthetic.  Under fluoroscopic guidance a 22 gauge spinal needle was placed at the L3-L4 interspace.  An opening pressure of 16 cm H2O was recorded.  9 mL of clear cerebrospinal fluid was removed.  The needle was then removed and the puncture site was cleaned and bandaged.  The patient tolerated the procedure well there were no immediate postprocedure complications.  Total fluoroscopy time was 38 seconds.

## 2022-07-12 LAB
ALBUMIN CSF-MCNC: 11.8 MG/DL (ref 0–35)
ALBUMIN INDEX: 3.4
APPEARANCE CSF: CLEAR
IGG CSF-MCNC: 2.23 MG/DL (ref 0–3.4)
IGG INDEX: 0.4
IGG SYNTHESIS RATE: 0 (ref 0–12)
OLIGOCLONAL BANDS CSF QL: NORMAL
PATH INTERP CSF-IMP: NORMAL
PROT CSF-MCNC: 28 MG/DL (ref 15–45)

## 2022-07-20 ENCOUNTER — OFFICE VISIT (OUTPATIENT)
Dept: RHEUMATOLOGY | Facility: CLINIC | Age: 47
End: 2022-07-20
Payer: OTHER GOVERNMENT

## 2022-07-20 VITALS
HEART RATE: 89 BPM | RESPIRATION RATE: 16 BRPM | OXYGEN SATURATION: 97 % | SYSTOLIC BLOOD PRESSURE: 142 MMHG | DIASTOLIC BLOOD PRESSURE: 98 MMHG

## 2022-07-20 DIAGNOSIS — G37.9 DEMYELINATING DISEASE OF CENTRAL NERVOUS SYSTEM, UNSPECIFIED: ICD-10-CM

## 2022-07-20 DIAGNOSIS — I77.6 VASCULITIS: Primary | ICD-10-CM

## 2022-07-20 PROCEDURE — 99213 OFFICE O/P EST LOW 20 MIN: CPT | Mod: PBBFAC | Performed by: INTERNAL MEDICINE

## 2022-07-20 PROCEDURE — 99215 PR OFFICE/OUTPT VISIT, EST, LEVL V, 40-54 MIN: ICD-10-PCS | Mod: S$PBB,,, | Performed by: INTERNAL MEDICINE

## 2022-07-20 PROCEDURE — 99215 OFFICE O/P EST HI 40 MIN: CPT | Mod: S$PBB,,, | Performed by: INTERNAL MEDICINE

## 2022-07-20 NOTE — PROGRESS NOTES
Robin Zhang MD   [facility] - RHEUMATOLOGY  [address] [place] [address]  [phone]      PATIENT NAME: Laurel Mary  : 1975  DATE: 22  MRN: [id number]      Billing Provider: Robin Zhang MD  Level of Service:   Patient PCP Information     Provider PCP Type    Antonio Wilson MD General          Reason for Visit / Chief Complaint: Follow-up (5 week follow up /Patient has been unable to get medications/ infusion that was ordered due to INS ([facility]))       Assessment and Plan (including Health Maintenance)      Problem List  Smart Sets  Document Outside HM   :  Laurel is a 47 yo female with an unfortunate progressive history of upper and lower extremity weakness with what strikes me as left wrist and right foot drop. She has paresthesias ~ mononeuritis multiplex like etiology.   Patient also has significant muscle weakness likely secondary to neurological weakness and atrophy related to it vs myositis vs CIDP    I am leaning towards vasculitis vs CIDP as etiology for her neuromuscular disorder.     I have ordered an EMG/NCV for [place]. Will also refer patient to Neurology.     Prior MRIs done were not obtained with contrast to look for demyelination.     I am starting her on high dose steroids today and will order for IV pulse solumedrol to be given by infusion 500mg IV x 3 days . PCP procphylaxis started along with high dose steroids.  However, patient needs complete diagnostic workup and will more than likely progress to requiring IVIG therapy.     She needs urgent support and help with treatment as she stands to lose functional capacity further and likely has ongoing immune mediated damage to nerves ~ recent left wrist drop.     I will loop in neurology on this case to expedite diagnostics with EMG/NCV. A nerve biopsy would be useful as well. However should not delay treatment  and will initiate treatment plan with steroids  today.    Primary care should review anti-HTN regimen closely.     2022;   Ms. Mary is here for follow-up.  In the interim she had a severe attack of dysphagia for which she was seen at [facility] and had an MRI of her brain done.  MRI with and without contrast of the brain has shown patchy and confluent foci of T2 hyper sensitivity predominantly the periventricular white matter.  This would be consistent with disease condition such as MS or the more rare if condition of CADASIL.  She has been seen by on neurologist at [facility] Dr. Davis.   She reports her symptoms starting after late trimester  which led to complications of pelvic inflammatory disease and endometriosis.  Patient will need to be seen by [facility] specialists Dr. Kinney who is considering a referral to an MS specialist as well.  I agree with this plan.  It might be worth starting patient on 10 mg of prednisone just to see if it helps with some of her symptoms of brain fog.  I doubt if her peripheral neuropathy but the symptoms would be helped.  EMG nerve conduction study is largely normal.  And Dr. Davis did not appreciate the foot and wrist drop that I had noticed at our 1st visit.  So she has relapsing remitting symptoms.    2022;  MS specialist visit is pending insurance authorization  Unfortunately patient is facing push back and hurdles from her insurance to get access to specialists or her medications.   Today I was supposed to assess response to prednisone but she has not been started on it.   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         History of Present Illness / Problem Focused Workflow     Laurel CR Usman presents to the clinic with Follow-up (5 week follow up /Patient has been unable to get medications/ infusion that was  ordered [facility]     Hx of voiding issues with now progressive urinary incontinence   New onset left hand  weakness   [facility]    Antonio Wilson MD      Review of Systems     Review of Systems    Medical / Social / Family History     Past Medical History:   Diagnosis Date    CIDP (chronic inflammatory demyelinating polyneuropathy) 3/3/2022    Foot drop     Hypertension     Osteoarthritis        Past Surgical History:   Procedure Laterality Date    ENDOMETRIAL BIOPSY         Social History  Ms. Laurel Mary  reports that she has never smoked. She has never used smokeless tobacco. She reports current alcohol use. She reports that she does not use drugs.    Family History  Ms.'s Laurel Mary family history includes Diabetes in her mother; Hypertension in her father and mother; Leukemia in her father.    Medications and Allergies     Medications  Outpatient Medications Marked as Taking for the 7/20/22 encounter (Office Visit) with Robin Zhang MD   Medication Sig Dispense Refill    losartan-hydrochlorothiazide 100-25 mg (HYZAAR) 100-25 mg per tablet Take 1 tablet by mouth once daily. 90 tablet 3       Allergies  Review of patient's allergies indicates:  No Known Allergies    Physical Examination     Vitals:    07/20/22 1536   BP: (!) 142/98   Pulse: 89   Resp: 16     Physical Exam  Musculoskeletal:      Left wrist: Deformity present.      Right hip: Deformity and tenderness present. Decreased strength.      Left hip: Decreased strength.      Right foot: Swelling, deformity and Charcot foot present. Abnormal pulse.      Comments: Left wrist drop    Neurological:      Motor: Weakness, atrophy, abnormal muscle tone and pronator drift present.      Gait: Gait abnormal and tandem walk abnormal.      Comments: Patient has Right foot drop and new onset ( 2 months ) left wrist drop.     Left  weakness    No small joint synovitis     Proximal muscle  weakness +     Reflexes depressed.                 Signature:  Robin Zhang MD  [facility] - RHEUMATOLOGY  [address] [place] [address]  [phone]    Date of encounter: 7/20/22

## 2022-07-22 ENCOUNTER — OFFICE VISIT (OUTPATIENT)
Dept: FAMILY MEDICINE | Facility: CLINIC | Age: 47
End: 2022-07-22
Payer: OTHER GOVERNMENT

## 2022-07-22 VITALS
RESPIRATION RATE: 18 BRPM | BODY MASS INDEX: 27.22 KG/M2 | DIASTOLIC BLOOD PRESSURE: 70 MMHG | WEIGHT: 169.38 LBS | SYSTOLIC BLOOD PRESSURE: 130 MMHG | HEIGHT: 66 IN | OXYGEN SATURATION: 99 % | HEART RATE: 92 BPM

## 2022-07-22 DIAGNOSIS — Q66.6 PES PLANOVALGUS: ICD-10-CM

## 2022-07-22 DIAGNOSIS — G61.81 CIDP (CHRONIC INFLAMMATORY DEMYELINATING POLYNEUROPATHY): Primary | ICD-10-CM

## 2022-07-22 DIAGNOSIS — M21.371 ACQUIRED RIGHT FOOT DROP: ICD-10-CM

## 2022-07-22 PROCEDURE — 99212 PR OFFICE/OUTPT VISIT, EST, LEVL II, 10-19 MIN: ICD-10-PCS | Mod: ,,, | Performed by: FAMILY MEDICINE

## 2022-07-22 PROCEDURE — 99212 OFFICE O/P EST SF 10 MIN: CPT | Mod: ,,, | Performed by: FAMILY MEDICINE

## 2022-07-25 NOTE — PROGRESS NOTES
Antonio Wilson MD        PATIENT NAME: Laurel Mary  : 1975  DATE: 22  MRN: [id number]      Billing Provider: Antonio Wilson MD  Level of Service: [place] OFFICE/OUTPT VISIT, EST, LEVL II, 10-19 MIN  Patient PCP Information     Provider PCP Type    Antonio Wilson MD General          Reason for Visit / Chief Complaint: Medication Refill (Low iron) and Medication Problem (Patient on phone at visit with insurance informed patient that referral to neurology and rheumatology and medication prescribed by them will be covered after paper submitted anything before date forms submitted will not be covered./Will submit paper work with previous referral today.)       Update PCP  Update Chief Complaint         History of Present Illness / Problem Focused Workflow     Laurel Mary presents to the clinic with Medication Refill (Low iron) and Medication Problem (Patient on phone at visit with insurance informed patient that referral to neurology and rheumatology and medication prescribed by them will be covered after paper submitted anything before date forms submitted will not be covered./Will submit paper work with previous referral today.)     For overall followup.  Unchanged.  Seeing rheumatology and neurology.  Referrals haven't been approved and she is under financial pressure.      Medication Refill  Associated symptoms include weakness (global weakness). Pertinent negatives include no abdominal pain, arthralgias, chest pain, congestion, coughing, fever, headaches, nausea, neck pain, rash or sore throat.       Review of Systems     Review of Systems   Constitutional: Negative for activity change, appetite change, fever and unexpected weight change.   HENT: Negative for congestion, rhinorrhea, sinus pressure, sinus pain, sore throat and trouble swallowing.    Eyes: Negative for photophobia, pain, discharge and visual disturbance.   Respiratory: Negative for cough, chest tightness, wheezing and stridor.     Cardiovascular: Negative for chest pain, palpitations and leg swelling.   Gastrointestinal: Negative for abdominal pain, blood in stool, constipation, diarrhea and nausea.   Endocrine: Negative for polydipsia, polyphagia and polyuria.   Genitourinary: Negative for difficulty urinating, flank pain and hematuria.   Musculoskeletal: Negative for arthralgias and neck pain.   Skin: Negative for rash.   Allergic/Immunologic: Negative for food allergies.   Neurological: Positive for tremors, weakness (global weakness) and light-headedness. Negative for dizziness, seizures, syncope and headaches.   Psychiatric/Behavioral: Positive for decreased concentration and dysphoric mood. Negative for behavioral problems, confusion and hallucinations. The patient is nervous/anxious.         Medical / Social / Family History     Past Medical History:   Diagnosis Date    CIDP (chronic inflammatory demyelinating polyneuropathy) 3/3/2022    Foot drop     Hypertension     Osteoarthritis        Past Surgical History:   Procedure Laterality Date    ENDOMETRIAL BIOPSY         Social History  Ms.  reports that she has never smoked. She has never used smokeless tobacco. She reports current alcohol use. She reports that she does not use drugs.    Family History  Ms.'s family history includes Diabetes in her mother; Hypertension in her father and mother; Leukemia in her father.    Medications and Allergies     Medications  No outpatient medications have been marked as taking for the 7/22/22 encounter (Office Visit) with Antonio Wilson MD.       Allergies  Review of patient's allergies indicates:  No Known Allergies    Physical Examination     Vitals:    07/22/22 1403   BP: 130/70   Pulse: 92   Resp: 18     Physical Exam  Constitutional:       General: She is not in acute distress.     Appearance: Normal appearance.   HENT:      Head: Normocephalic.      Right Ear: Tympanic membrane and ear canal normal.      Left Ear: Tympanic membrane  and ear canal normal.      Nose: Nose normal.      Mouth/Throat:      Mouth: Mucous membranes are moist.      Pharynx: No oropharyngeal exudate.   Eyes:      Extraocular Movements: Extraocular movements intact.      Pupils: Pupils are equal, round, and reactive to light.   Cardiovascular:      Rate and Rhythm: Normal rate and regular rhythm.      Heart sounds: No murmur heard.  Pulmonary:      Effort: Pulmonary effort is normal.      Breath sounds: Normal breath sounds. No wheezing.   Abdominal:      General: Abdomen is flat. Bowel sounds are normal.      Palpations: Abdomen is soft.      Hernia: No hernia is present.   Musculoskeletal:         General: Normal range of motion.      Cervical back: Normal range of motion and neck supple.      Right lower leg: No edema.      Left lower leg: No edema.   Lymphadenopathy:      Cervical: No cervical adenopathy.   Skin:     General: Skin is warm and dry.      Coloration: Skin is not jaundiced.      Findings: No lesion.   Neurological:      General: No focal deficit present.      Mental Status: She is alert and oriented to person, place, and time. Mental status is at baseline.      Cranial Nerves: No cranial nerve deficit.      Gait: Gait normal.   Psychiatric:         Mood and Affect: Mood normal.         Behavior: Behavior normal.         Judgment: Judgment normal.          Assessment and Plan (including Health Maintenance)      Problem List  Smart Sets  Document Outside HM   :    Plan:   CIDP (chronic inflammatory demyelinating polyneuropathy)    Pes planovalgus    Acquired right foot drop           Health Maintenance Due   Topic Date Due    Cervical Cancer Screening  Never done    Lipid Panel  Never done    COVID-19 Vaccine (1) Never done    HIV Screening  Never done    TETANUS VACCINE  Never done    Mammogram  Never done       Problem List Items Addressed This Visit        Neuro    Acquired right foot drop    CIDP (chronic inflammatory demyelinating  polyneuropathy) - Primary       Orthopedic    Pes planovalgus          Health Maintenance Topics with due status: Not Due       Topic Last Completion Date    Influenza Vaccine 01/22/2020    Colorectal Cancer Screening 10/01/2021       Future Appointments   Date Time Provider Department Center   8/1/2022 11:00 AM Klaus Davis MD [facility] NEURO [facility]        There are no Patient Instructions on file for this visit.  Follow up if symptoms worsen or fail to improve.     Signature:  Antonio Wilson MD      Date of encounter: 7/22/22

## 2022-08-01 ENCOUNTER — OFFICE VISIT (OUTPATIENT)
Dept: NEUROLOGY | Facility: CLINIC | Age: 47
End: 2022-08-01
Payer: OTHER GOVERNMENT

## 2022-08-01 VITALS
DIASTOLIC BLOOD PRESSURE: 110 MMHG | HEART RATE: 101 BPM | BODY MASS INDEX: 26.52 KG/M2 | WEIGHT: 165 LBS | HEIGHT: 66 IN | SYSTOLIC BLOOD PRESSURE: 162 MMHG

## 2022-08-01 DIAGNOSIS — M62.81 PROXIMAL LIMB MUSCLE WEAKNESS: Primary | ICD-10-CM

## 2022-08-01 PROCEDURE — 99213 OFFICE O/P EST LOW 20 MIN: CPT | Mod: PBBFAC | Performed by: PSYCHIATRY & NEUROLOGY

## 2022-08-01 PROCEDURE — 99214 PR OFFICE/OUTPT VISIT, EST, LEVL IV, 30-39 MIN: ICD-10-PCS | Mod: S$PBB,,, | Performed by: PSYCHIATRY & NEUROLOGY

## 2022-08-01 PROCEDURE — 99214 OFFICE O/P EST MOD 30 MIN: CPT | Mod: S$PBB,,, | Performed by: PSYCHIATRY & NEUROLOGY

## 2022-08-01 NOTE — PATIENT INSTRUCTIONS
Pt has had a to -date negative w/u- fully negative LP, NCV. EMG, labs, imaging  F/u  Rheum regularly  Sched for specialist eval in [place]   Pt trying to get her testing approved for and paid by [facility]  F/u prn

## 2022-08-01 NOTE — PROGRESS NOTES
Subjective:       Patient ID: Laurel Mary is a 46 y.o. female     Chief Complaint:    Chief Complaint   Patient presents with    Follow-up        Allergies:  Patient has no known allergies.    Current Medications:    Outpatient Encounter Medications as of 8/1/2022   Medication Sig Dispense Refill    losartan-hydrochlorothiazide 100-25 mg (HYZAAR) 100-25 mg per tablet Take 1 tablet by mouth once daily. 90 tablet 3    aspirin/salicylamide/caffeine (BC HEADACHE POWDER ORAL) Take by mouth.      [DISCONTINUED] baclofen (LIORESAL) 10 MG tablet One tab po tid prn 270 tablet 3    [DISCONTINUED] ferrous sulfate 325 (65 FE) MG EC tablet Take 1 tablet (325 mg total) by mouth once daily. 30 tablet 0    [DISCONTINUED] metoprolol succinate (TOPROL-XL) 50 MG 24 hr tablet Take 1 tablet (50 mg total) by mouth once daily. (Patient not taking: Reported on 7/20/2022) 90 tablet 3    [DISCONTINUED] predniSONE (DELTASONE) 10 MG tablet Take 2 tablets (20 mg total) by mouth once daily. (Patient not taking: Reported on 7/20/2022) 100 tablet 0     No facility-administered encounter medications on file as of 8/1/2022.       History of Present Illness  45 yo BF w/ mult somatic complaints and variable but intermittent symptoms throughout ext's - muscle weaknesses and variable complaints ?  Pt has signif anxiety and prev hx childhood issues and  trauma, PTSD  Pt has had recent LP- spinal tap ;which was comp NL w/o any oligo bands basically r/o MULTIPLE SCLEROSIS  Pt trying now to get insurance approved for prior tests and investigations through [facility]            Past Medical History:   Diagnosis Date    CIDP (chronic inflammatory demyelinating polyneuropathy) 3/3/2022    Foot drop     Hypertension     Osteoarthritis        Past Surgical History:   Procedure Laterality Date    ENDOMETRIAL BIOPSY         Social History  Ms. Mary  reports that she has never smoked. She has never used smokeless tobacco. She reports current  "alcohol use. She reports that she does not use drugs.    Family History  Ms.'s Mary family history includes Diabetes in her mother; Hypertension in her father and mother; Leukemia in her father.    Review of Systems  Review of Systems   Musculoskeletal: Positive for joint pain.   Neurological: Positive for dizziness, sensory change and focal weakness.   Psychiatric/Behavioral: The patient is nervous/anxious.    All other systems reviewed and are negative.     Objective:   BP (!) 162/110   Pulse 101   Ht 5' 6" (1.676 m)   Wt 74.8 kg (165 lb)   BMI 26.63 kg/m²    NEUROLOGICAL EXAMINATION:     MENTAL STATUS   Oriented to person, place, and time.   Level of consciousness: alert  Knowledge: good.        Pressured speech, anxiety     CRANIAL NERVES   Cranial nerves II through XII intact.     MOTOR EXAM   Muscle bulk: normal  Overall muscle tone: normal    Strength   Strength 5/5 throughout.     REFLEXES     Reflexes   Right brachioradialis: 2+  Left brachioradialis: 2+  Right biceps: 2+  Left biceps: 2+  Right triceps: 2+  Left triceps: 2+  Right patellar: 2+  Left patellar: 2+  Right achilles: 2+  Left achilles: 2+  Right plantar: normal  Left plantar: normal    SENSORY EXAM        parethesias in all ext's -variable and intermittent     GAIT AND COORDINATION     Gait  Gait: normal       Physical Exam  Vitals reviewed.   Neurological:      General: No focal deficit present.      Mental Status: She is alert and oriented to person, place, and time. Mental status is at baseline.      Cranial Nerves: Cranial nerves 2-12 are intact.      Motor: Motor strength is normal.      Gait: Gait is intact.      Deep Tendon Reflexes:      Reflex Scores:       Tricep reflexes are 2+ on the right side and 2+ on the left side.       Bicep reflexes are 2+ on the right side and 2+ on the left side.       Brachioradialis reflexes are 2+ on the right side and 2+ on the left side.       Patellar reflexes are 2+ on the right side and 2+ on " the left side.       Achilles reflexes are 2+ on the right side and 2+ on the left side.         Assessment:     Proximal limb muscle weakness  Comments:  unsure of etiololgy- recetn NCV/EMG per Dr Lynch in [facility] comp normal         Primary Diagnosis and ICD10  Proximal limb muscle weakness [M62.81]    Plan:     Patient Instructions   Pt has had a to -date negative w/u- fully negative LP, NCV. EMG, labs, imaging  F/u  Rheum regularly  Sched for specialist gila in [place]   Pt trying to get her testing approved for and paid by [facility]  F/u prn        Medications Discontinued During This Encounter   Medication Reason    metoprolol succinate (TOPROL-XL) 50 MG 24 hr tablet     predniSONE (DELTASONE) 10 MG tablet     baclofen (LIORESAL) 10 MG tablet     ferrous sulfate 325 (65 FE) MG EC tablet        Requested Prescriptions      No prescriptions requested or ordered in this encounter

## 2022-09-16 ENCOUNTER — OFFICE VISIT (OUTPATIENT)
Dept: FAMILY MEDICINE | Facility: CLINIC | Age: 47
End: 2022-09-16
Payer: OTHER GOVERNMENT

## 2022-09-16 VITALS
WEIGHT: 169 LBS | HEART RATE: 92 BPM | DIASTOLIC BLOOD PRESSURE: 79 MMHG | HEIGHT: 66 IN | SYSTOLIC BLOOD PRESSURE: 140 MMHG | BODY MASS INDEX: 27.16 KG/M2 | OXYGEN SATURATION: 99 % | RESPIRATION RATE: 18 BRPM

## 2022-09-16 DIAGNOSIS — M54.9 BACK PAIN, UNSPECIFIED BACK LOCATION, UNSPECIFIED BACK PAIN LATERALITY, UNSPECIFIED CHRONICITY: Primary | ICD-10-CM

## 2022-09-16 PROCEDURE — 99213 PR OFFICE/OUTPT VISIT, EST, LEVL III, 20-29 MIN: ICD-10-PCS | Mod: ,,, | Performed by: FAMILY MEDICINE

## 2022-09-16 PROCEDURE — 99213 OFFICE O/P EST LOW 20 MIN: CPT | Mod: ,,, | Performed by: FAMILY MEDICINE

## 2022-09-16 NOTE — PROGRESS NOTES
Antonio Wilson MD        PATIENT NAME: Laurel Mary  : 1975  DATE: 22  MRN: [id number]      Billing Provider: Antonio Wilson MD  Level of Service: SC OFFICE/OUTPT VISIT, EST, LEVL III, 20-29 MIN  Patient PCP Information       Provider PCP Type    Antonio Wilson MD General            Reason for Visit / Chief Complaint: iron (Patient wants to discuss iron//Patient wants to discuss what Dr. Zhang talked about on her last visit with her. )       Update PCP  Update Chief Complaint         History of Present Illness / Problem Focused Workflow     Laurel Mary presents to the clinic with iron (Patient wants to discuss iron//Patient wants to discuss what Dr. Zhang talked about on her last visit with her. )     Hx discordant ANCA.  Neurologist rec. Check for ankylosing spondylitis.      Review of Systems     Review of Systems   Constitutional:  Positive for fatigue. Negative for activity change, appetite change, fever and unexpected weight change.   HENT:  Negative for congestion, rhinorrhea, sinus pressure, sinus pain, sore throat and trouble swallowing.    Eyes:  Negative for photophobia, pain, discharge and visual disturbance.   Respiratory:  Negative for cough, chest tightness, wheezing and stridor.    Cardiovascular:  Negative for chest pain, palpitations and leg swelling.   Gastrointestinal:  Negative for abdominal pain, blood in stool, constipation, diarrhea and nausea.   Endocrine: Negative for polydipsia, polyphagia and polyuria.   Genitourinary:  Negative for difficulty urinating, flank pain and hematuria.   Musculoskeletal:  Negative for arthralgias and neck pain.   Skin:  Negative for rash.   Allergic/Immunologic: Negative for food allergies.   Neurological:  Positive for tremors and weakness (global weakness). Negative for dizziness, seizures, syncope and headaches.   Psychiatric/Behavioral:  Negative for behavioral problems, confusion, decreased concentration, dysphoric mood and  hallucinations. The patient is not nervous/anxious.       Medical / Social / Family History     Past Medical History:   Diagnosis Date    CIDP (chronic inflammatory demyelinating polyneuropathy) 3/3/2022    Foot drop     Hypertension     Osteoarthritis        Past Surgical History:   Procedure Laterality Date    ENDOMETRIAL BIOPSY         Social History  Ms.  reports that she has never smoked. She has never used smokeless tobacco. She reports current alcohol use. She reports that she does not use drugs.    Family History  Ms.'s family history includes Diabetes in her mother; Hypertension in her father and mother; Leukemia in her father.    Medications and Allergies     Medications  No outpatient medications have been marked as taking for the 9/16/22 encounter (Office Visit) with Antonio Wilson MD.       Allergies  Review of patient's allergies indicates:  No Known Allergies    Physical Examination     Vitals:    09/16/22 0942   BP: (!) 140/79   Pulse: 92   Resp: 18     Physical Exam  Constitutional:       General: She is not in acute distress.     Appearance: Normal appearance.   HENT:      Head: Normocephalic.      Right Ear: Tympanic membrane and ear canal normal.      Left Ear: Tympanic membrane and ear canal normal.      Nose: Nose normal.      Mouth/Throat:      Mouth: Mucous membranes are moist.      Pharynx: No oropharyngeal exudate.   Eyes:      Extraocular Movements: Extraocular movements intact.      Pupils: Pupils are equal, round, and reactive to light.   Cardiovascular:      Rate and Rhythm: Normal rate and regular rhythm.      Heart sounds: No murmur heard.  Pulmonary:      Effort: Pulmonary effort is normal.      Breath sounds: Normal breath sounds. No wheezing.   Abdominal:      General: Abdomen is flat. Bowel sounds are normal.      Palpations: Abdomen is soft.      Hernia: No hernia is present.   Musculoskeletal:         General: Normal range of motion.      Cervical back: Normal range of motion  and neck supple.      Right lower leg: No edema.      Left lower leg: No edema.   Lymphadenopathy:      Cervical: No cervical adenopathy.   Skin:     General: Skin is warm and dry.      Coloration: Skin is not jaundiced.      Findings: No lesion.   Neurological:      General: No focal deficit present.      Mental Status: She is alert and oriented to person, place, and time. Mental status is at baseline.      Cranial Nerves: No cranial nerve deficit.      Gait: Gait normal.   Psychiatric:         Mood and Affect: Mood normal.         Behavior: Behavior normal.         Judgment: Judgment normal.        Assessment and Plan (including Health Maintenance)      Problem List  Smart Sets  Document Outside HM   :    Plan:   Back pain, unspecified back location, unspecified back pain laterality, unspecified chronicity  -     X-Ray Pelvis Routine AP; Future; Expected date: 09/16/2022         Health Maintenance Due   Topic Date Due    Cervical Cancer Screening  Never done    Lipid Panel  Never done    COVID-19 Vaccine (1) Never done    HIV Screening  Never done    TETANUS VACCINE  Never done    Mammogram  Never done    Colorectal Cancer Screening  Never done    Influenza Vaccine (1) 09/01/2022       Problem List Items Addressed This Visit    None  Visit Diagnoses       Back pain, unspecified back location, unspecified back pain laterality, unspecified chronicity    -  Primary    Relevant Orders    X-Ray Pelvis Routine AP (Completed)            The patient has no Health Maintenance topics of status Not Due      Future Appointments   Date Time Provider Department Center   9/26/2022  3:45 PM Robin Zhang MD [facility] RHEU [facility]   10/4/2022 10:30 AM Klaus Davis MD [facility] NEURO [facility]        There are no Patient Instructions on file for this visit.  Follow up if symptoms worsen or fail to improve.     Signature:  Antonio Wilson MD      Date of encounter: 9/16/22

## 2022-09-26 ENCOUNTER — OFFICE VISIT (OUTPATIENT)
Dept: RHEUMATOLOGY | Facility: CLINIC | Age: 47
End: 2022-09-26
Payer: OTHER GOVERNMENT

## 2022-09-26 VITALS
RESPIRATION RATE: 16 BRPM | DIASTOLIC BLOOD PRESSURE: 90 MMHG | SYSTOLIC BLOOD PRESSURE: 150 MMHG | OXYGEN SATURATION: 98 % | HEART RATE: 106 BPM

## 2022-09-26 DIAGNOSIS — G37.9 DEMYELINATING DISEASE OF CENTRAL NERVOUS SYSTEM, UNSPECIFIED: Primary | ICD-10-CM

## 2022-09-26 PROCEDURE — 99215 OFFICE O/P EST HI 40 MIN: CPT | Mod: S$PBB,,, | Performed by: INTERNAL MEDICINE

## 2022-09-26 PROCEDURE — 99215 PR OFFICE/OUTPT VISIT, EST, LEVL V, 40-54 MIN: ICD-10-PCS | Mod: S$PBB,,, | Performed by: INTERNAL MEDICINE

## 2022-09-26 PROCEDURE — 99213 OFFICE O/P EST LOW 20 MIN: CPT | Mod: PBBFAC | Performed by: INTERNAL MEDICINE

## 2022-09-26 RX ORDER — LOSARTAN POTASSIUM 100 MG/1
100 TABLET ORAL DAILY
COMMUNITY
End: 2022-11-14 | Stop reason: SDUPTHER

## 2022-09-26 NOTE — Clinical Note
Sorry I sat on this note for so long. You can send Laurel a copy of this. I had nothing to add. I am waiting for her MS workup which was pending even at last visit. Once we have that conclusive consultation report then I am happy to see her back.  Robin

## 2022-09-26 NOTE — PROGRESS NOTES
Robin Zhang MD   [facility] - RHEUMATOLOGY  [address] [place] [address]  [phone]      PATIENT NAME: Laurel Mary  : 1975  DATE: 22  MRN: [id number]      Billing Provider: Robin Zhang MD  Level of Service:   Patient PCP Information       Provider PCP Type    Antonio Wilson MD General            Reason for Visit / Chief Complaint: Follow-up (3 month follow up vasculitis/C/o spots on lower legs)       Assessment and Plan (including Health Maintenance)      Problem List  Smart Sets  Document Outside HM   :  Laurel is a 47 yo female with an unfortunate progressive history of upper and lower extremity weakness with what strikes me as left wrist and right foot drop. She has paresthesias ~ mononeuritis multiplex like etiology.   Patient also has significant muscle weakness likely secondary to neurological weakness and atrophy related to it vs myositis vs CIDP    I am leaning towards vasculitis vs CIDP as etiology for her neuromuscular disorder.     I have ordered an EMG/NCV for Juan. Will also refer patient to Neurology.     Prior MRIs done were not obtained with contrast to look for demyelination.     I am starting her on high dose steroids today and will order for IV pulse solumedrol to be given by infusion 500mg IV x 3 days . PCP procphylaxis started along with high dose steroids.  However, patient needs complete diagnostic workup and will more than likely progress to requiring IVIG therapy.     She needs urgent support and help with treatment as she stands to lose functional capacity further and likely has ongoing immune mediated damage to nerves ~ recent left wrist drop.     I will loop in neurology on this case to expedite diagnostics with EMG/NCV. A nerve biopsy would be useful as well. However should not delay treatment  and will initiate treatment plan with steroids today.    Primary care should review anti-HTN regimen closely.      2022;   Ms. Mary is here for follow-up.  In the interim she had a severe attack of dysphagia for which she was seen at [facility] and had an MRI of her brain done.  MRI with and without contrast of the brain has shown patchy and confluent foci of T2 hyper sensitivity predominantly the periventricular white matter.  This would be consistent with disease condition such as MS or the more rare if condition of CADASIL.  She has been seen by on neurologist at [facility] Dr. Davis.   She reports her symptoms starting after late trimester  which led to complications of pelvic inflammatory disease and endometriosis.  Patient will need to be seen by [facility] specialists Dr. Kinney who is considering a referral to an MS specialist as well.  I agree with this plan.  It might be worth starting patient on 10 mg of prednisone just to see if it helps with some of her symptoms of brain fog.  I doubt if her peripheral neuropathy but the symptoms would be helped.  EMG nerve conduction study is largely normal.  And Dr. Davis did not appreciate the foot and wrist drop that I had noticed at our 1st visit.  So she has relapsing remitting symptoms.    2022;  MS specialist visit is pending insurance authorization  Unfortunately patient is facing push back and hurdles from her insurance to get access to specialists or her medications.   Today I was supposed to assess response to prednisone but she has not been started on it.   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     2022:  Awaiting completion of MS therapy still.   Followup after MS definitively ruled out.           History of Present Illness / Problem Focused Workflow     Laurel Mary presents to the clinic with Follow-up (3 month follow up vasculitis/C/o spots  on lower legs)     Hx of voiding issues with now progressive urinary incontinence   New onset left hand  weakness   [facility]    Antonio Wilson MD      Review of Systems     Review of Systems    Medical / Social / Family History     Past Medical History:   Diagnosis Date    CIDP (chronic inflammatory demyelinating polyneuropathy) 3/3/2022    Foot drop     Hypertension     Osteoarthritis        Past Surgical History:   Procedure Laterality Date    ENDOMETRIAL BIOPSY         Social History  Ms. Laurel Mary  reports that she has never smoked. She has never used smokeless tobacco. She reports current alcohol use. She reports that she does not use drugs.    Family History  Ms.'s Laurel Mary family history includes Diabetes in her mother; Hypertension in her father and mother; Leukemia in her father.    Medications and Allergies     Medications  Outpatient Medications Marked as Taking for the 9/26/22 encounter (Office Visit) with Robni Zhang MD   Medication Sig Dispense Refill    aspirin/salicylamide/caffeine (BC HEADACHE POWDER ORAL) Take by mouth.      losartan (COZAAR) 100 MG tablet Take 100 mg by mouth once daily.         Allergies  Review of patient's allergies indicates:  No Known Allergies    Physical Examination     Vitals:    09/26/22 1605   BP: (!) 150/90   Pulse: 106   Resp: 16     Physical Exam  Musculoskeletal:      Left wrist: Deformity present.      Right hip: Deformity and tenderness present. Decreased strength.      Left hip: Decreased strength.      Right foot: Swelling, deformity and Charcot foot present. Abnormal pulse.      Comments: Left wrist drop    Neurological:      Motor: Weakness, atrophy, abnormal muscle tone and pronator drift present.      Gait: Gait abnormal and tandem walk abnormal.      Comments: Patient has Right foot drop and new onset ( 2 months ) left wrist drop.     Left  weakness    No small joint synovitis     Proximal muscle  weakness +     Reflexes depressed.                 Signature:  Robin Zhang MD  [facility] - RHEUMATOLOGY  [address] [place] [address]  [phone]    Date of encounter: 9/26/22

## 2022-11-08 ENCOUNTER — HOSPITAL ENCOUNTER (EMERGENCY)
Facility: HOSPITAL | Age: 47
Discharge: HOME | End: 2022-11-08
Payer: OTHER GOVERNMENT

## 2022-11-08 VITALS
RESPIRATION RATE: 17 BRPM | OXYGEN SATURATION: 100 % | HEART RATE: 99 BPM | HEIGHT: 66 IN | WEIGHT: 174 LBS | BODY MASS INDEX: 27.97 KG/M2 | TEMPERATURE: 99 F | DIASTOLIC BLOOD PRESSURE: 108 MMHG | SYSTOLIC BLOOD PRESSURE: 171 MMHG

## 2022-11-08 DIAGNOSIS — M25.519 SHOULDER PAIN: ICD-10-CM

## 2022-11-08 DIAGNOSIS — R03.0 ELEVATED BLOOD PRESSURE READING: ICD-10-CM

## 2022-11-08 DIAGNOSIS — M25.511 RIGHT SHOULDER PAIN: ICD-10-CM

## 2022-11-08 LAB
ANION GAP SERPL CALCULATED.3IONS-SCNC: 13 MMOL/L (ref 7–16)
APTT PPP: 24.7 SECONDS (ref 25.2–37.3)
BASOPHILS # BLD AUTO: 0.02 K/UL (ref 0–0.2)
BASOPHILS NFR BLD AUTO: 0.4 % (ref 0–1)
BUN SERPL-MCNC: 16 MG/DL (ref 7–18)
BUN/CREAT SERPL: 20 (ref 6–20)
CALCIUM SERPL-MCNC: 8.2 MG/DL (ref 8.5–10.1)
CHLORIDE SERPL-SCNC: 102 MMOL/L (ref 98–107)
CK MB SERPL-MCNC: <1 NG/ML (ref 1–3.6)
CO2 SERPL-SCNC: 27 MMOL/L (ref 21–32)
CREAT SERPL-MCNC: 0.82 MG/DL (ref 0.55–1.02)
DIFFERENTIAL METHOD BLD: ABNORMAL
EGFR (NO RACE VARIABLE) (RUSH/TITUS): 88 ML/MIN/1.73M²
EOSINOPHIL # BLD AUTO: 0.03 K/UL (ref 0–0.5)
EOSINOPHIL NFR BLD AUTO: 0.5 % (ref 1–4)
ERYTHROCYTE [DISTWIDTH] IN BLOOD BY AUTOMATED COUNT: 17.9 % (ref 11.5–14.5)
GLUCOSE SERPL-MCNC: 77 MG/DL (ref 74–106)
HCT VFR BLD AUTO: 26.1 % (ref 38–47)
HGB BLD-MCNC: 7.8 G/DL (ref 12–16)
IMM GRANULOCYTES # BLD AUTO: 0.02 K/UL (ref 0–0.04)
IMM GRANULOCYTES NFR BLD: 0.4 % (ref 0–0.4)
INR BLD: 0.96
LYMPHOCYTES # BLD AUTO: 1.2 K/UL (ref 1–4.8)
LYMPHOCYTES NFR BLD AUTO: 21.4 % (ref 27–41)
MAGNESIUM SERPL-MCNC: 1.8 MG/DL (ref 1.7–2.3)
MCH RBC QN AUTO: 22.7 PG (ref 27–31)
MCHC RBC AUTO-ENTMCNC: 29.9 G/DL (ref 32–36)
MCV RBC AUTO: 75.9 FL (ref 80–96)
MONOCYTES # BLD AUTO: 0.7 K/UL (ref 0–0.8)
MONOCYTES NFR BLD AUTO: 12.5 % (ref 2–6)
MPC BLD CALC-MCNC: 8 FL (ref 9.4–12.4)
NEUTROPHILS # BLD AUTO: 3.63 K/UL (ref 1.8–7.7)
NEUTROPHILS NFR BLD AUTO: 64.8 % (ref 53–65)
NRBC # BLD AUTO: 0 X10E3/UL
NRBC, AUTO (.00): 0 %
PLATELET # BLD AUTO: 394 K/UL (ref 150–400)
POC OCCULT BLOOD STOOL: NEGATIVE
POTASSIUM SERPL-SCNC: 3.6 MMOL/L (ref 3.5–5.1)
PROTHROMBIN TIME: 12.4 SECONDS (ref 11.7–14.7)
RBC # BLD AUTO: 3.44 M/UL (ref 4.2–5.4)
SODIUM SERPL-SCNC: 138 MMOL/L (ref 136–145)
TROPONIN I SERPL HS-MCNC: 63.7 PG/ML
TROPONIN I SERPL HS-MCNC: 63.7 PG/ML
WBC # BLD AUTO: 5.6 K/UL (ref 4.5–11)

## 2022-11-08 PROCEDURE — 93010 EKG 12-LEAD: ICD-10-PCS | Mod: ,,, | Performed by: HOSPITALIST

## 2022-11-08 PROCEDURE — 93010 ELECTROCARDIOGRAM REPORT: CPT | Mod: ,,, | Performed by: HOSPITALIST

## 2022-11-08 PROCEDURE — 85025 COMPLETE CBC W/AUTO DIFF WBC: CPT | Performed by: NURSE PRACTITIONER

## 2022-11-08 PROCEDURE — 99283 PR EMERGENCY DEPT VISIT,LEVEL III: ICD-10-PCS | Mod: ,,, | Performed by: NURSE PRACTITIONER

## 2022-11-08 PROCEDURE — 84484 ASSAY OF TROPONIN QUANT: CPT | Performed by: NURSE PRACTITIONER

## 2022-11-08 PROCEDURE — 93005 ELECTROCARDIOGRAM TRACING: CPT

## 2022-11-08 PROCEDURE — 83735 ASSAY OF MAGNESIUM: CPT | Performed by: NURSE PRACTITIONER

## 2022-11-08 PROCEDURE — 80048 BASIC METABOLIC PNL TOTAL CA: CPT | Performed by: NURSE PRACTITIONER

## 2022-11-08 PROCEDURE — 93010 EKG 12-LEAD: ICD-10-PCS | Mod: ,,, | Performed by: STUDENT IN AN ORGANIZED HEALTH CARE EDUCATION/TRAINING PROGRAM

## 2022-11-08 PROCEDURE — 85730 THROMBOPLASTIN TIME PARTIAL: CPT | Performed by: NURSE PRACTITIONER

## 2022-11-08 PROCEDURE — 82272 OCCULT BLD FECES 1-3 TESTS: CPT

## 2022-11-08 PROCEDURE — 99285 EMERGENCY DEPT VISIT HI MDM: CPT | Mod: 25

## 2022-11-08 PROCEDURE — 36415 COLL VENOUS BLD VENIPUNCTURE: CPT | Performed by: NURSE PRACTITIONER

## 2022-11-08 PROCEDURE — 85610 PROTHROMBIN TIME: CPT | Performed by: NURSE PRACTITIONER

## 2022-11-08 PROCEDURE — 82553 CREATINE MB FRACTION: CPT | Performed by: NURSE PRACTITIONER

## 2022-11-08 PROCEDURE — 99283 EMERGENCY DEPT VISIT LOW MDM: CPT | Mod: ,,, | Performed by: NURSE PRACTITIONER

## 2022-11-08 PROCEDURE — 93010 ELECTROCARDIOGRAM REPORT: CPT | Mod: ,,, | Performed by: STUDENT IN AN ORGANIZED HEALTH CARE EDUCATION/TRAINING PROGRAM

## 2022-11-08 NOTE — ED TRIAGE NOTES
"Pt presents to ed with c/o right shoulder pain that won't go away. States it started this AM. States it is going down arm. States she is supposed to take BP medicine but doesn't because it is "a [facility] in the butt" [facility].  [facility] and they told her to come to the ER to rule out cardiac problems.  "

## 2022-11-08 NOTE — DISCHARGE INSTRUCTIONS
Follow up with your primary care provider in 2 days. Take your blood pressure medication as directed. Return to the emergency department for any exertional pain, change in the nature of your pain, increase in symptoms or for any other new or worrisome symptoms.

## 2022-11-08 NOTE — ED PROVIDER NOTES
Encounter Date: 11/8/2022       History     Chief Complaint   Patient presents with    Shoulder Pain     47 year old female presents to the emergency department to be evaluated for pain to her right shoulder that radiates to her neck and right upper arm. [facility] and was told to come to the ED. Her pain gets worse when she twists. Denies any exertional pain. Denies any injury. Denies any shortness of breath. She has a history of hypertension and said she has not been taking her medications. Denies headache.    The history is provided by the patient.   Shoulder Pain  This is a new problem. Pertinent negatives include no chest pain, no abdominal pain, no headaches and no shortness of breath.   Review of patient's allergies indicates:  No Known Allergies  Past Medical History:   Diagnosis Date    CIDP (chronic inflammatory demyelinating polyneuropathy) 3/3/2022    Foot drop     Hypertension     Osteoarthritis      Past Surgical History:   Procedure Laterality Date    ENDOMETRIAL BIOPSY       Family History   Problem Relation Age of Onset    Hypertension Mother     Diabetes Mother     Hypertension Father     Leukemia Father      Social History     Tobacco Use    Smoking status: Never    Smokeless tobacco: Never   Substance Use Topics    Alcohol use: Yes     Comment: occ    Drug use: Never     Review of Systems   Respiratory:  Negative for shortness of breath.    Cardiovascular:  Negative for chest pain.   Gastrointestinal:  Negative for abdominal pain.   Neurological:  Negative for headaches.     Physical Exam     Initial Vitals [11/08/22 0916]   BP Pulse Resp Temp SpO2   (!) 160/111 98 16 98.7 °F (37.1 °C) 100 %      MAP       --         Physical Exam    Vitals reviewed.  Constitutional: She appears well-developed and well-nourished.   HENT:   Head: Normocephalic and atraumatic.   Eyes: EOM are normal. Pupils are equal, round, and reactive to light.   Neck: Neck supple.   Cardiovascular:   Normal rate and regular rhythm.           Pulses:       Radial pulses are 3+ on the right side and 3+ on the left side.   Pulmonary/Chest: Breath sounds normal.   Abdominal: Abdomen is soft. Bowel sounds are normal. She exhibits no distension and no mass. There is no abdominal tenderness. There is no rebound and no guarding.   Musculoskeletal:         General: No tenderness or edema. Normal range of motion.      Right shoulder: Normal.      Right upper arm: Normal.      Cervical back: Normal and neck supple.      Thoracic back: Normal.      Lumbar back: Normal.     Neurological: She is alert and oriented to person, place, and time. She has normal strength. GCS score is 15. GCS eye subscore is 4. GCS verbal subscore is 5. GCS motor subscore is 6.   Skin: Skin is warm and dry. Capillary refill takes less than 2 seconds.   Psychiatric: She has a normal mood and affect.       Medical Screening Exam   See Full Note    ED Course   Procedures  Labs Reviewed   BASIC METABOLIC PANEL - Abnormal; Notable for the following components:       Result Value    Calcium 8.2 (*)     All other components within normal limits   TROPONIN I - Abnormal; Notable for the following components:    Troponin I High Sensitivity 63.7 (*)     All other components within normal limits   CK-MB - Abnormal; Notable for the following components:    CK-MB <1.0 (*)     All other components within normal limits   APTT - Abnormal; Notable for the following components:    PTT 24.7 (*)     All other components within normal limits   CBC WITH DIFFERENTIAL - Abnormal; Notable for the following components:    RBC 3.44 (*)     Hemoglobin 7.8 (*)     Hematocrit 26.1 (*)     MCV 75.9 (*)     MCH 22.7 (*)     MCHC 29.9 (*)     RDW 17.9 (*)     MPV 8.0 (*)     Lymphocytes % 21.4 (*)     Monocytes % 12.5 (*)     Eosinophils % 0.5 (*)     All other components within normal limits   TROPONIN I - Abnormal; Notable for the following components:    Troponin I High  Sensitivity 63.7 (*)     All other components within normal limits   PROTIME-INR - Normal   MAGNESIUM - Normal   CBC W/ AUTO DIFFERENTIAL    Narrative:     The following orders were created for panel order CBC Auto Differential.  Procedure                               Abnormality         Status                     ---------                               -----------         ------                     CBC with Differential[[id number]]        Abnormal            Final result                 Please view results for these tests on the individual orders.   POCT OCCULT BLOOD (STOOL)     EKG Readings: (Independently Interpreted)   Initial Reading: No STEMI. Rhythm: Normal Sinus Rhythm. Heart Rate: 89.   Other EKG Interpretations: 1400 sinus rhythm, rate 98   ECG Results              EKG 12-lead (In process)  Result time 11/08/22 10:51:50      In process by Interface, Lab In [facility] (11/08/22 10:51:50)                   Narrative:    Test Reason : M25.511,    Vent. Rate : 089 BPM     Atrial Rate : 000 BPM     P-R Int : 138 ms          QRS Dur : 100 ms      QT Int : 366 ms       P-R-T Axes : 085 -34 072 degrees     QTc Int : 417 ms    Sinus rhythm  with PAC(s)  Possible left anterior fascicular block  rSr'(V1) - probable normal variant  Lateral ST abnormality  is nonspecific  Borderline ECG      Referred By: [email or web address]   SELF           Confirmed By:                                   Imaging Results              X-Ray Chest 1 View (Final result)  Result time 11/08/22 09:50:01      Final result by Primo Sotelo II, MD (11/08/22 09:50:01)                   Impression:      No evidence of acute cardiopulmonary disease.      Electronically signed by: Primo Sotelo  Date:    11/08/2022  Time:    09:50               Narrative:    EXAMINATION:  XR CHEST 1 VIEW    CLINICAL HISTORY:  Elevated blood-pressure reading, without diagnosis of hypertension    COMPARISON:  None available    FINDINGS:  The heart and mediastinum are normal  in size and configuration.  The pulmonary vascularity is normal in caliber.  No lung infiltrates, effusions, pneumothorax or other abnormality is demonstrated.                                       Medications - No data to display                    Clinical Impression:   Final diagnoses:  [M25.511] Right shoulder pain  [R03.0] Elevated blood pressure reading  [M25.519] Shoulder pain      ED Disposition Condition    Discharge Stable          ED Prescriptions    None       Follow-up Information    None          JOSE RAMON Cardenas  11/08/22 1420       JOSE RAMON Cardenas  11/08/22 1422

## 2022-11-14 ENCOUNTER — OFFICE VISIT (OUTPATIENT)
Dept: FAMILY MEDICINE | Facility: CLINIC | Age: 47
End: 2022-11-14
Payer: OTHER GOVERNMENT

## 2022-11-14 VITALS
HEIGHT: 66 IN | HEART RATE: 117 BPM | BODY MASS INDEX: 27 KG/M2 | WEIGHT: 168 LBS | DIASTOLIC BLOOD PRESSURE: 82 MMHG | OXYGEN SATURATION: 99 % | SYSTOLIC BLOOD PRESSURE: 128 MMHG | RESPIRATION RATE: 20 BRPM

## 2022-11-14 DIAGNOSIS — R07.9 CHEST PAIN, UNSPECIFIED TYPE: Primary | ICD-10-CM

## 2022-11-14 DIAGNOSIS — I10 UNCONTROLLED HYPERTENSION: ICD-10-CM

## 2022-11-14 PROCEDURE — 99214 OFFICE O/P EST MOD 30 MIN: CPT | Mod: ,,, | Performed by: FAMILY MEDICINE

## 2022-11-14 PROCEDURE — 99214 PR OFFICE/OUTPT VISIT, EST, LEVL IV, 30-39 MIN: ICD-10-PCS | Mod: ,,, | Performed by: FAMILY MEDICINE

## 2022-11-14 RX ORDER — LOSARTAN POTASSIUM 100 MG/1
100 TABLET ORAL DAILY
Qty: 90 TABLET | Refills: 3 | Status: SHIPPED | OUTPATIENT
Start: 2022-11-14

## 2022-11-14 NOTE — PROGRESS NOTES
Antonio Wilson MD        PATIENT NAME: Laurel Mary  : 1975  DATE: 22  MRN: [id number]      Billing Provider: Antonio Wilson MD  Level of Service:   Patient PCP Information       Provider PCP Type    Antonio Wilson MD General            Reason for Visit / Chief Complaint: Follow-up (Went to ER with shoulder pain radiating to chest)       Update PCP  Update Chief Complaint         History of Present Illness / Problem Focused Workflow     Laurel Mary presents to the clinic with Follow-up (Went to ER with shoulder pain radiating to chest)     Right shoulder pain .  To ER.  Is now fine.      Follow-up  Associated symptoms include arthralgias and fatigue. Pertinent negatives include no abdominal pain, chest pain, congestion, coughing, fever, headaches, nausea, neck pain, rash, sore throat or weakness (global weakness).     Review of Systems     Review of Systems   Constitutional:  Positive for fatigue. Negative for activity change, appetite change, fever and unexpected weight change.   HENT:  Negative for congestion, rhinorrhea, sinus pressure, sinus pain, sore throat and trouble swallowing.    Eyes:  Negative for photophobia, pain, discharge and visual disturbance.   Respiratory:  Negative for cough, chest tightness, wheezing and stridor.    Cardiovascular:  Negative for chest pain, palpitations and leg swelling.   Gastrointestinal:  Negative for abdominal pain, blood in stool, constipation, diarrhea and nausea.   Endocrine: Negative for polydipsia, polyphagia and polyuria.   Genitourinary:  Negative for difficulty urinating, flank pain and hematuria.   Musculoskeletal:  Positive for arthralgias. Negative for neck pain.   Skin:  Negative for rash.   Allergic/Immunologic: Negative for food allergies.   Neurological:  Negative for dizziness, tremors, seizures, syncope, weakness (global weakness) and headaches.   Psychiatric/Behavioral:  Negative for behavioral problems, confusion, decreased  concentration, dysphoric mood and hallucinations. The patient is not nervous/anxious.       Medical / Social / Family History     Past Medical History:   Diagnosis Date    CIDP (chronic inflammatory demyelinating polyneuropathy) 3/3/2022    Foot drop     Hypertension     Osteoarthritis        Past Surgical History:   Procedure Laterality Date    ENDOMETRIAL BIOPSY         Social History  Ms.  reports that she has never smoked. She has never been exposed to tobacco smoke. She has never used smokeless tobacco. She reports current alcohol use. She reports that she does not use drugs.    Family History  Ms.'s family history includes Diabetes in her mother; Hypertension in her father and mother; Leukemia in her father.    Medications and Allergies     Medications  No outpatient medications have been marked as taking for the 11/14/22 encounter (Office Visit) with Antonio Wilson MD.       Allergies  Review of patient's allergies indicates:  No Known Allergies    Physical Examination     Vitals:    11/14/22 1330   BP: 128/82   Pulse: (!) 117   Resp: 20     Physical Exam  Constitutional:       General: She is not in acute distress.     Appearance: Normal appearance.   HENT:      Head: Normocephalic.      Right Ear: Tympanic membrane and ear canal normal.      Left Ear: Tympanic membrane and ear canal normal.      Nose: Nose normal.      Mouth/Throat:      Mouth: Mucous membranes are moist.      Pharynx: No oropharyngeal exudate.   Eyes:      Extraocular Movements: Extraocular movements intact.      Pupils: Pupils are equal, round, and reactive to light.   Cardiovascular:      Rate and Rhythm: Normal rate and regular rhythm.      Heart sounds: No murmur heard.  Pulmonary:      Effort: Pulmonary effort is normal.      Breath sounds: Normal breath sounds. No wheezing.   Abdominal:      General: Abdomen is flat. Bowel sounds are normal.      Palpations: Abdomen is soft.      Hernia: No hernia is present.    Musculoskeletal:         General: Normal range of motion.      Cervical back: Normal range of motion and neck supple.      Right lower leg: No edema.      Left lower leg: No edema.   Lymphadenopathy:      Cervical: No cervical adenopathy.   Skin:     General: Skin is warm and dry.      Coloration: Skin is not jaundiced.      Findings: No lesion.   Neurological:      General: No focal deficit present.      Mental Status: She is alert and oriented to person, place, and time.      Cranial Nerves: No cranial nerve deficit.      Gait: Gait normal.   Psychiatric:         Mood and Affect: Mood normal.         Behavior: Behavior normal.         Judgment: Judgment normal.        Assessment and Plan (including Health Maintenance)      Problem List  [employer]  Document Outside HM   :    Plan:   There are no diagnoses linked to this encounter.       Health Maintenance Due   Topic Date Due    Cervical Cancer Screening  Never done    Lipid Panel  Never done    COVID-19 Vaccine (1) Never done    HIV Screening  Never done    TETANUS VACCINE  Never done    Mammogram  Never done    Influenza Vaccine (1) 09/01/2022       Problem List Items Addressed This Visit    None      Health Maintenance Topics with due status: Not Due       Topic Last Completion Date    Colorectal Cancer Screening 10/01/2021       No future appointments.     There are no Patient Instructions on file for this visit.  No follow-ups on file.     Signature:  Antonio Wilson MD      Date of encounter: 11/14/22

## 2022-12-20 ENCOUNTER — OFFICE VISIT (OUTPATIENT)
Dept: FAMILY MEDICINE | Facility: CLINIC | Age: 47
End: 2022-12-20
Payer: OTHER GOVERNMENT

## 2022-12-20 VITALS
RESPIRATION RATE: 16 BRPM | HEIGHT: 66 IN | HEART RATE: 96 BPM | BODY MASS INDEX: 27.35 KG/M2 | WEIGHT: 170.19 LBS | SYSTOLIC BLOOD PRESSURE: 140 MMHG | DIASTOLIC BLOOD PRESSURE: 92 MMHG

## 2022-12-20 DIAGNOSIS — D50.0 IRON DEFICIENCY ANEMIA DUE TO CHRONIC BLOOD LOSS: Primary | ICD-10-CM

## 2022-12-20 DIAGNOSIS — G93.40 ENCEPHALOPATHY: ICD-10-CM

## 2022-12-20 DIAGNOSIS — M85.89 OSTEOPENIA OF MULTIPLE SITES: ICD-10-CM

## 2022-12-20 PROCEDURE — 99213 OFFICE O/P EST LOW 20 MIN: CPT | Mod: ,,, | Performed by: FAMILY MEDICINE

## 2022-12-20 PROCEDURE — 99213 PR OFFICE/OUTPT VISIT, EST, LEVL III, 20-29 MIN: ICD-10-PCS | Mod: ,,, | Performed by: FAMILY MEDICINE

## 2022-12-20 RX ORDER — FERROUS SULFATE 325(65) MG
325 TABLET, DELAYED RELEASE (ENTERIC COATED) ORAL DAILY
Qty: 90 TABLET | Refills: 3 | Status: SHIPPED | OUTPATIENT
Start: 2022-12-20 | End: 2023-02-22 | Stop reason: SDUPTHER

## 2022-12-20 NOTE — PROGRESS NOTES
Antonio Wilson MD        PATIENT NAME: Laurel Mary  : 1975  DATE: 22  MRN: [id number]      Billing Provider: Antonio Wilson MD  Level of Service: [place] OFFICE/OUTPT VISIT, EST, LEVL III, 20-29 MIN  Patient PCP Information       Provider PCP Type    Antonio Wilson MD General            Reason for Visit / Chief Complaint: Hypertension (One month check.) and Anemia (Need prescription for iron for [facility] not just one over the [facility])       Update PCP  Update Chief Complaint         History of Present Illness / Problem Focused Workflow     Laurel Mary presents to the clinic with Hypertension (One month check.) and Anemia (Need prescription for iron for [facility] not just one over the counter)     To see Dr. Scales.  Needs iron study.      Hypertension  Pertinent negatives include no chest pain, headaches, neck pain or palpitations.   Anemia  There has been no abdominal pain, confusion, fever or palpitations.   Review of Systems     Review of Systems   Constitutional:  Positive for fatigue. Negative for activity change, appetite change, fever and unexpected weight change.   HENT:  Negative for congestion, rhinorrhea, sinus pressure, sinus pain, sore throat and trouble swallowing.    Eyes:  Negative for photophobia, pain, discharge and visual disturbance.   Respiratory:  Negative for cough, chest tightness, wheezing and stridor.    Cardiovascular:  Negative for chest pain, palpitations and leg swelling.   Gastrointestinal:  Negative for abdominal pain, blood in stool, constipation, diarrhea and nausea.   Endocrine: Negative for polydipsia, polyphagia and polyuria.   Genitourinary:  Negative for difficulty urinating, flank pain and hematuria.   Musculoskeletal:  Positive for arthralgias. Negative for neck pain.   Skin:  Negative for rash.   Allergic/Immunologic: Negative for food allergies.   Neurological:  Negative for dizziness, tremors, seizures, syncope, weakness (global weakness) and headaches.    Psychiatric/Behavioral:  Negative for behavioral problems, confusion, decreased concentration, dysphoric mood and hallucinations. The patient is not nervous/anxious.       Medical / Social / Family History     Past Medical History:   Diagnosis Date    CIDP (chronic inflammatory demyelinating polyneuropathy) 3/3/2022    Foot drop     Hypertension     Osteoarthritis        Past Surgical History:   Procedure Laterality Date    ENDOMETRIAL BIOPSY         Social History  Ms.  reports that she has never smoked. She has never been exposed to tobacco smoke. She has never used smokeless tobacco. She reports current alcohol use. She reports that she does not use drugs.    Family History  Ms.'s family history includes Diabetes in her mother; Hypertension in her father and mother; Leukemia in her father.    Medications and Allergies     Medications  No outpatient medications have been marked as taking for the 12/20/22 encounter (Office Visit) with Antonio Wilson MD.       Allergies  Review of patient's allergies indicates:  No Known Allergies    Physical Examination     Vitals:    12/20/22 1354   BP: (!) 140/92   Pulse: 96   Resp: 16     Physical Exam  Constitutional:       General: She is not in acute distress.     Appearance: Normal appearance.   HENT:      Head: Normocephalic.      Right Ear: Tympanic membrane and ear canal normal.      Left Ear: Tympanic membrane and ear canal normal.      Nose: Nose normal.      Mouth/Throat:      Mouth: Mucous membranes are moist.      Pharynx: No oropharyngeal exudate.   Eyes:      Extraocular Movements: Extraocular movements intact.      Pupils: Pupils are equal, round, and reactive to light.   Cardiovascular:      Rate and Rhythm: Normal rate and regular rhythm.      Heart sounds: No murmur heard.  Pulmonary:      Effort: Pulmonary effort is normal.      Breath sounds: Normal breath sounds. No wheezing.   Abdominal:      General: Abdomen is flat. Bowel sounds are normal.       Palpations: Abdomen is soft.      Hernia: No hernia is present.   Musculoskeletal:         General: Normal range of motion.      Cervical back: Normal range of motion and neck supple.      Right lower leg: No edema.      Left lower leg: No edema.   Lymphadenopathy:      Cervical: No cervical adenopathy.   Skin:     General: Skin is warm and dry.      Coloration: Skin is not jaundiced.      Findings: No lesion.   Neurological:      General: No focal deficit present.      Mental Status: She is alert and oriented to person, place, and time.      Cranial Nerves: No cranial nerve deficit.      Gait: Gait normal.   Psychiatric:         Mood and Affect: Mood normal.         Behavior: Behavior normal.         Judgment: Judgment normal.        Assessment and Plan (including Health Maintenance)      Problem List  [employer]  Document Outside HM   :    Plan:   Iron deficiency anemia due to chronic blood loss  -     ferrous sulfate 325 (65 FE) MG EC tablet; Take 1 tablet (325 mg total) by mouth once daily.  Dispense: 90 tablet; Refill: 3    Osteopenia of multiple sites  -     DXA Bone Density Spine And Hip; Future; Expected date: 12/20/2022    Encephalopathy           Health Maintenance Due   Topic Date Due    Cervical Cancer Screening  Never done    Lipid Panel  Never done    COVID-19 Vaccine (1) Never done    HIV Screening  Never done    TETANUS VACCINE  Never done    Mammogram  Never done    Influenza Vaccine (1) 09/01/2022       Problem List Items Addressed This Visit          Neuro    Encephalopathy     Other Visit Diagnoses       Iron deficiency anemia due to chronic blood loss    -  Primary    Relevant Medications    ferrous sulfate 325 (65 FE) MG EC tablet    Osteopenia of multiple sites        Relevant Orders    DXA Bone Density Spine And Hip            Health Maintenance Topics with due status: Not Due       Topic Last Completion Date    Colorectal Cancer Screening 10/01/2021       Future Appointments   Date Time  Provider Department Center   1/5/2023  8:00 AM [facility]    1/5/2023  9:00 AM [facility]   1/5/2023 10:00 AM [facility]    1/5/2023 11:00 AM [facility] DEXA1 [facility]   1/23/2023  1:00 PM Antonio Wilson MD [facility]        There are no Patient Instructions on file for this visit.  Follow up in about 4 weeks (around 1/17/2023) for routine followup.     Signature:  Antonio Wilson MD      Date of encounter: 12/20/22

## 2022-12-21 ENCOUNTER — OFFICE VISIT (OUTPATIENT)
Dept: CARDIOLOGY | Facility: CLINIC | Age: 47
End: 2022-12-21
Payer: OTHER GOVERNMENT

## 2022-12-21 VITALS
WEIGHT: 170 LBS | BODY MASS INDEX: 27.32 KG/M2 | OXYGEN SATURATION: 99 % | HEIGHT: 66 IN | SYSTOLIC BLOOD PRESSURE: 140 MMHG | DIASTOLIC BLOOD PRESSURE: 90 MMHG | HEART RATE: 110 BPM

## 2022-12-21 DIAGNOSIS — R07.9 CHEST PAIN, UNSPECIFIED TYPE: Primary | ICD-10-CM

## 2022-12-21 PROCEDURE — 99214 OFFICE O/P EST MOD 30 MIN: CPT | Mod: PBBFAC | Performed by: STUDENT IN AN ORGANIZED HEALTH CARE EDUCATION/TRAINING PROGRAM

## 2022-12-21 PROCEDURE — 99204 OFFICE O/P NEW MOD 45 MIN: CPT | Mod: S$PBB,,, | Performed by: STUDENT IN AN ORGANIZED HEALTH CARE EDUCATION/TRAINING PROGRAM

## 2022-12-21 PROCEDURE — 93010 ELECTROCARDIOGRAM REPORT: CPT | Mod: S$PBB,,, | Performed by: STUDENT IN AN ORGANIZED HEALTH CARE EDUCATION/TRAINING PROGRAM

## 2022-12-21 PROCEDURE — 99204 PR OFFICE/OUTPT VISIT, NEW, LEVL IV, 45-59 MIN: ICD-10-PCS | Mod: S$PBB,,, | Performed by: STUDENT IN AN ORGANIZED HEALTH CARE EDUCATION/TRAINING PROGRAM

## 2022-12-21 PROCEDURE — 93005 ELECTROCARDIOGRAM TRACING: CPT | Mod: PBBFAC | Performed by: STUDENT IN AN ORGANIZED HEALTH CARE EDUCATION/TRAINING PROGRAM

## 2022-12-21 PROCEDURE — 93010 EKG 12-LEAD: ICD-10-PCS | Mod: S$PBB,,, | Performed by: STUDENT IN AN ORGANIZED HEALTH CARE EDUCATION/TRAINING PROGRAM

## 2022-12-21 RX ORDER — AMLODIPINE BESYLATE 5 MG/1
5 TABLET ORAL DAILY
COMMUNITY
End: 2022-12-21 | Stop reason: SDUPTHER

## 2022-12-21 RX ORDER — ASPIRIN 81 MG/1
81 TABLET ORAL DAILY
Qty: 90 TABLET | Refills: 1 | Status: SHIPPED | OUTPATIENT
Start: 2022-12-21

## 2022-12-21 RX ORDER — ASPIRIN 81 MG/1
81 TABLET ORAL DAILY
COMMUNITY
End: 2022-12-21 | Stop reason: SDUPTHER

## 2022-12-21 RX ORDER — AMLODIPINE BESYLATE 5 MG/1
5 TABLET ORAL DAILY
Qty: 30 TABLET | Refills: 3 | Status: SHIPPED | OUTPATIENT
Start: 2022-12-21 | End: 2023-01-05 | Stop reason: SDUPTHER

## 2022-12-21 NOTE — PROGRESS NOTES
PCP: Antonio Wilson MD    Referring Provider: Antonio Wilson MD    Subjective:   Laurel Mary is a 47 y.o. female with hx of HTN, OA and suspected chronic inflammatory demyelinating neuropathy who presents for a new patient visit     Patient was referred to cardiology for evaluation of chest pain. She endorses a longstanding hx of intermittent episodes of chest pain- at times like chest tightness with and without exertion; as well as occasional sharp and stabbing chest pain. She went to the ED recently on 11/8/2022 with chest pain radiating to right shoulder and jaw. ECG with no acute ischemic changes. High sensitivity troponin was borderline elevated at 63.7 but flat. She was discharged home. Of, she is also anemic with her last Hb noted to be 7.8 on 11/8/22. Iron studies consistent with iron deficiency anemia. Denies melena or BRBPR and notes a negative colonoscopy.     She denies dyspnea on exertion, dizziness/lightheadedness, syncopal episodes, palpitations, orthopnea, PND or leg swelling. ROS positive for fatigue.    Fhx: Father (CAD s/p CABG), Mother (DM, HTN)    Shx: Never smoker. No illicit drug use. Drinks alcohol socially.     EKG 12/21/2022: Sinus tachycardia, left axis deviation, poor R wave progression  ECHO None   LHC None       Lab Results   Component Value Date     11/08/2022    K 3.6 11/08/2022     11/08/2022    CO2 27 11/08/2022    BUN 16 11/08/2022    CREATININE 0.82 11/08/2022    CALCIUM 8.2 (L) 11/08/2022    ANIONGAP 13 11/08/2022    EGFRNONAA 61 01/14/2022       No results found for: CHOL  No results found for: HDL  No results found for: LDLCALC  No results found for: TRIG  No results found for: CHOLHDL    Lab Results   Component Value Date    WBC 5.60 11/08/2022    HGB 7.8 (L) 11/08/2022    HCT 26.1 (L) 11/08/2022    MCV 75.9 (L) 11/08/2022     11/08/2022           Current Outpatient Medications:     losartan (COZAAR) 100 MG tablet, Take 1 tablet (100 mg total) by mouth  "once daily., Disp: 90 tablet, Rfl: 3    amLODIPine (NORVASC) 5 MG tablet, Take 1 tablet (5 mg total) by mouth once daily., Disp: 30 tablet, Rfl: 3    aspirin (ECOTRIN) 81 MG EC tablet, Take 1 tablet (81 mg total) by mouth once daily., Disp: 90 tablet, Rfl: 1    aspirin/salicylamide/caffeine (BC HEADACHE POWDER ORAL), Take by mouth., Disp: , Rfl:     ferrous sulfate 325 (65 FE) MG EC tablet, Take 1 tablet (325 mg total) by mouth once daily., Disp: 90 tablet, Rfl: 3    Review of Systems   Constitutional:  Positive for malaise/fatigue. Negative for chills and fever.   Respiratory:  Negative for cough, hemoptysis and wheezing.    Cardiovascular:  Positive for chest pain. Negative for palpitations, orthopnea, claudication, leg swelling and PND.   Gastrointestinal:  Negative for abdominal pain, heartburn, nausea and vomiting.       Objective:   BP (!) 140/90 (BP Location: Left arm, Patient Position: Sitting)   Pulse 110   Ht 5' 6" (1.676 m)   Wt 77.1 kg (170 lb)   SpO2 99%   BMI 27.44 kg/m²     Physical Exam  Constitutional:       General: She is not in acute distress.     Appearance: Normal appearance.   HENT:      Head: Normocephalic and atraumatic.   Cardiovascular:      Rate and Rhythm: Normal rate and regular rhythm.      Pulses: Normal pulses.      Heart sounds: Normal heart sounds. No murmur heard.    No friction rub. No gallop.   Pulmonary:      Effort: Pulmonary effort is normal. No respiratory distress.      Breath sounds: Normal breath sounds. No wheezing or rales.   Skin:     General: Skin is warm and dry.   Neurological:      Mental Status: She is alert.         Assessment:     1. Chest pain, unspecified type  EKG 12-lead    Ambulatory referral/consult to Cardiology    EKG 12-lead    NM Myocardial Perfusion Spect Multi Exer    Nuclear Stress Test            Plan:   No problem-specific Assessment & Plan notes found for this encounter.      Atypical chest pain   Intermediate pre-test probability for CAD "   Will proceed with exercise stress test with nuclear imaging  Start aspirin 81 mg daily     HTN  BP elevated- 140/99 today  Continue losartan 100 mg daily   Start amlodipine 5 mg daily     F/U in 1 month to review testing.    Michell Stanley MD  Cardiology

## 2023-01-05 ENCOUNTER — HOSPITAL ENCOUNTER (OUTPATIENT)
Dept: RADIOLOGY | Facility: HOSPITAL | Age: 48
Discharge: HOME | End: 2023-01-05
Attending: STUDENT IN AN ORGANIZED HEALTH CARE EDUCATION/TRAINING PROGRAM
Payer: OTHER GOVERNMENT

## 2023-01-05 ENCOUNTER — HOSPITAL ENCOUNTER (OUTPATIENT)
Dept: CARDIOLOGY | Facility: HOSPITAL | Age: 48
Discharge: HOME | End: 2023-01-05
Attending: STUDENT IN AN ORGANIZED HEALTH CARE EDUCATION/TRAINING PROGRAM
Payer: OTHER GOVERNMENT

## 2023-01-05 VITALS
DIASTOLIC BLOOD PRESSURE: 107 MMHG | BODY MASS INDEX: 27.32 KG/M2 | HEART RATE: 75 BPM | HEIGHT: 66 IN | WEIGHT: 170 LBS | SYSTOLIC BLOOD PRESSURE: 165 MMHG

## 2023-01-05 DIAGNOSIS — R07.9 CHEST PAIN, UNSPECIFIED TYPE: ICD-10-CM

## 2023-01-05 LAB
CV STRESS BASE HR: 75 BPM
DIASTOLIC BLOOD PRESSURE: 107 MMHG
OHS CV CPX 1 MINUTE RECOVERY HEART RATE: 86 BPM
OHS CV CPX 85 PERCENT MAX PREDICTED HEART RATE MALE: 139
OHS CV CPX MAX PREDICTED HEART RATE: 164
OHS CV CPX PATIENT IS FEMALE: 1
OHS CV CPX PATIENT IS MALE: 0
OHS CV CPX PEAK DIASTOLIC BLOOD PRESSURE: 111 MMHG
OHS CV CPX PEAK HEAR RATE: 162 BPM
OHS CV CPX PEAK RATE PRESSURE PRODUCT: NORMAL
OHS CV CPX PEAK SYSTOLIC BLOOD PRESSURE: 201 MMHG
OHS CV CPX PERCENT MAX PREDICTED HEART RATE ACHIEVED: 99
OHS CV CPX RATE PRESSURE PRODUCT PRESENTING: NORMAL
SYSTOLIC BLOOD PRESSURE: 165 MMHG

## 2023-01-05 PROCEDURE — 93018 CV STRESS TEST I&R ONLY: CPT | Mod: ,,, | Performed by: STUDENT IN AN ORGANIZED HEALTH CARE EDUCATION/TRAINING PROGRAM

## 2023-01-05 PROCEDURE — 93018 NUCLEAR STRESS TEST (CUPID ONLY): ICD-10-PCS | Mod: ,,, | Performed by: STUDENT IN AN ORGANIZED HEALTH CARE EDUCATION/TRAINING PROGRAM

## 2023-01-05 PROCEDURE — 93016 CV STRESS TEST SUPVJ ONLY: CPT | Mod: ,,, | Performed by: NURSE PRACTITIONER

## 2023-01-05 PROCEDURE — 78452 HT MUSCLE IMAGE SPECT MULT: CPT | Mod: TC

## 2023-01-05 PROCEDURE — 78452 HT MUSCLE IMAGE SPECT MULT: CPT | Mod: 26,,, | Performed by: STUDENT IN AN ORGANIZED HEALTH CARE EDUCATION/TRAINING PROGRAM

## 2023-01-05 PROCEDURE — A9500 TC99M SESTAMIBI: HCPCS

## 2023-01-05 PROCEDURE — 93016 NUCLEAR STRESS TEST (CUPID ONLY): ICD-10-PCS | Mod: ,,, | Performed by: NURSE PRACTITIONER

## 2023-01-05 PROCEDURE — 93017 CV STRESS TEST TRACING ONLY: CPT

## 2023-01-05 PROCEDURE — 78452 NM MYOCARDIAL PERFUSION SPECT MULTI STUDY: ICD-10-PCS | Mod: 26,,, | Performed by: STUDENT IN AN ORGANIZED HEALTH CARE EDUCATION/TRAINING PROGRAM

## 2023-01-05 RX ORDER — AMLODIPINE BESYLATE 5 MG/1
5 TABLET ORAL DAILY
Qty: 30 TABLET | Refills: 0 | Status: SHIPPED | OUTPATIENT
Start: 2023-01-05

## 2023-01-23 ENCOUNTER — OFFICE VISIT (OUTPATIENT)
Dept: FAMILY MEDICINE | Facility: CLINIC | Age: 48
End: 2023-01-23
Payer: OTHER GOVERNMENT

## 2023-01-23 VITALS
BODY MASS INDEX: 27.97 KG/M2 | TEMPERATURE: 98 F | RESPIRATION RATE: 16 BRPM | SYSTOLIC BLOOD PRESSURE: 160 MMHG | HEIGHT: 66 IN | HEART RATE: 112 BPM | OXYGEN SATURATION: 100 % | WEIGHT: 174 LBS | DIASTOLIC BLOOD PRESSURE: 100 MMHG

## 2023-01-23 DIAGNOSIS — D50.0 IRON DEFICIENCY ANEMIA DUE TO CHRONIC BLOOD LOSS: Primary | ICD-10-CM

## 2023-01-23 LAB
BASOPHILS # BLD AUTO: 0.02 K/UL (ref 0–0.2)
BASOPHILS NFR BLD AUTO: 0.3 % (ref 0–1)
DIFFERENTIAL METHOD BLD: ABNORMAL
EOSINOPHIL # BLD AUTO: 0.03 K/UL (ref 0–0.5)
EOSINOPHIL NFR BLD AUTO: 0.5 % (ref 1–4)
ERYTHROCYTE [DISTWIDTH] IN BLOOD BY AUTOMATED COUNT: 19.3 % (ref 11.5–14.5)
FERRITIN SERPL-MCNC: 8 NG/ML (ref 8–252)
HCT VFR BLD AUTO: 28.3 % (ref 38–47)
HGB BLD-MCNC: 8.4 G/DL (ref 12–16)
IMM GRANULOCYTES # BLD AUTO: 0.02 K/UL (ref 0–0.04)
IMM GRANULOCYTES NFR BLD: 0.3 % (ref 0–0.4)
IMM RETICS NFR: 29.7 % (ref 3–15.9)
IRON SATN MFR SERPL: 3 % (ref 14–50)
IRON SERPL-MCNC: 16 ΜG/DL (ref 50–170)
LYMPHOCYTES # BLD AUTO: 1.63 K/UL (ref 1–4.8)
LYMPHOCYTES NFR BLD AUTO: 26.6 % (ref 27–41)
MCH RBC QN AUTO: 22.8 PG (ref 27–31)
MCHC RBC AUTO-ENTMCNC: 29.7 G/DL (ref 32–36)
MCV RBC AUTO: 76.9 FL (ref 80–96)
MONOCYTES # BLD AUTO: 0.62 K/UL (ref 0–0.8)
MONOCYTES NFR BLD AUTO: 10.1 % (ref 2–6)
MPC BLD CALC-MCNC: 8.4 FL (ref 9.4–12.4)
NEUTROPHILS # BLD AUTO: 3.81 K/UL (ref 1.8–7.7)
NEUTROPHILS NFR BLD AUTO: 62.2 % (ref 53–65)
NRBC # BLD AUTO: 0 X10E3/UL
NRBC, AUTO (.00): 0 %
PLATELET # BLD AUTO: 495 K/UL (ref 150–400)
RBC # BLD AUTO: 3.68 M/UL (ref 4.2–5.4)
RETICS # AUTO: 0.05 X10E6/UL (ref 0.02–0.11)
RETICS/RBC NFR AUTO: 1.4 % (ref 0.4–2.2)
TIBC SERPL-MCNC: 521 ΜG/DL (ref 250–450)
VIT B12 SERPL-MCNC: 325 PG/ML (ref 193–986)
WBC # BLD AUTO: 6.13 K/UL (ref 4.5–11)

## 2023-01-23 PROCEDURE — 85045 AUTOMATED RETICULOCYTE COUNT: CPT | Mod: ,,, | Performed by: CLINICAL MEDICAL LABORATORY

## 2023-01-23 PROCEDURE — 83550 IRON BINDING TEST: CPT | Mod: ,,, | Performed by: CLINICAL MEDICAL LABORATORY

## 2023-01-23 PROCEDURE — 99213 PR OFFICE/OUTPT VISIT, EST, LEVL III, 20-29 MIN: ICD-10-PCS | Mod: ,,, | Performed by: FAMILY MEDICINE

## 2023-01-23 PROCEDURE — 83540 IRON AND TIBC: ICD-10-PCS | Mod: ,,, | Performed by: CLINICAL MEDICAL LABORATORY

## 2023-01-23 PROCEDURE — 85025 COMPLETE CBC W/AUTO DIFF WBC: CPT | Mod: ,,, | Performed by: CLINICAL MEDICAL LABORATORY

## 2023-01-23 PROCEDURE — 83540 ASSAY OF IRON: CPT | Mod: ,,, | Performed by: CLINICAL MEDICAL LABORATORY

## 2023-01-23 PROCEDURE — 83550 IRON AND TIBC: ICD-10-PCS | Mod: ,,, | Performed by: CLINICAL MEDICAL LABORATORY

## 2023-01-23 PROCEDURE — 99213 OFFICE O/P EST LOW 20 MIN: CPT | Mod: ,,, | Performed by: FAMILY MEDICINE

## 2023-01-23 PROCEDURE — 82728 ASSAY OF FERRITIN: CPT | Mod: ,,, | Performed by: CLINICAL MEDICAL LABORATORY

## 2023-01-23 PROCEDURE — 82728 FERRITIN: ICD-10-PCS | Mod: ,,, | Performed by: CLINICAL MEDICAL LABORATORY

## 2023-01-23 PROCEDURE — 82607 VITAMIN B-12: CPT | Mod: ,,, | Performed by: CLINICAL MEDICAL LABORATORY

## 2023-01-23 PROCEDURE — 85045 RETICULOCYTES: ICD-10-PCS | Mod: ,,, | Performed by: CLINICAL MEDICAL LABORATORY

## 2023-01-23 PROCEDURE — 82607 VITAMIN B12: ICD-10-PCS | Mod: ,,, | Performed by: CLINICAL MEDICAL LABORATORY

## 2023-01-23 PROCEDURE — 85025 CBC WITH DIFFERENTIAL: ICD-10-PCS | Mod: ,,, | Performed by: CLINICAL MEDICAL LABORATORY

## 2023-01-23 NOTE — PROGRESS NOTES
Antonio Wilson MD        PATIENT NAME: Laurel Mary  : 1975  DATE: 23  MRN: [id number]      Billing Provider: Antonio Wilson MD  Level of Service: MA OFFICE/OUTPT VISIT, EST, LEVL III, 20-29 MIN  Patient PCP Information       Provider PCP Type    Antonio Wilson MD General            Reason for Visit / Chief Complaint: Peripheral Neuropathy (Pain and swelling feet)       Update PCP  Update Chief Complaint         History of Present Illness / Problem Focused Workflow     Laurel Mary presents to the clinic with Peripheral Neuropathy (Pain and swelling feet)     L foot turning in.  Pain in L foot laterally.  Dr. Vasques wanted her to start prednisone.  Long term.  Charcot     Review of Systems     Review of Systems   Constitutional:  Positive for fatigue. Negative for activity change, appetite change, fever and unexpected weight change.   HENT:  Negative for congestion, rhinorrhea, sinus pressure, sinus pain, sore throat and trouble swallowing.    Eyes:  Negative for photophobia, pain, discharge and visual disturbance.   Respiratory:  Negative for cough, chest tightness, wheezing and stridor.    Cardiovascular:  Negative for chest pain, palpitations and leg swelling.   Gastrointestinal:  Negative for abdominal pain, blood in stool, constipation, diarrhea and nausea.   Endocrine: Negative for polydipsia, polyphagia and polyuria.   Genitourinary:  Negative for difficulty urinating, flank pain and hematuria.   Musculoskeletal:  Positive for arthralgias and myalgias. Negative for neck pain.   Skin:  Negative for rash.   Allergic/Immunologic: Negative for food allergies.   Neurological:  Positive for weakness (global weakness). Negative for dizziness, tremors, seizures, syncope and headaches.   Psychiatric/Behavioral:  Negative for behavioral problems, confusion, decreased concentration, dysphoric mood and hallucinations. The patient is not nervous/anxious.       Medical / Social / Family History      Past Medical History:   Diagnosis Date    CIDP (chronic inflammatory demyelinating polyneuropathy) 3/3/2022    Foot drop     Hypertension     Osteoarthritis        Past Surgical History:   Procedure Laterality Date    ENDOMETRIAL BIOPSY         Social History  Ms.  reports that she has never smoked. She has never been exposed to tobacco smoke. She has never used smokeless tobacco. She reports current alcohol use. She reports that she does not use drugs.    Family History  Ms.'s family history includes Diabetes in her mother; Hypertension in her father and mother; Leukemia in her father.    Medications and Allergies     Medications  No outpatient medications have been marked as taking for the 1/23/23 encounter (Office Visit) with Antonio Wilson MD.       Allergies  Review of patient's allergies indicates:  No Known Allergies    Physical Examination     Vitals:    01/23/23 1325   BP: (!) 160/100   Pulse: (!) 112   Resp: 16   Temp: 98.2 °F (36.8 °C)     Physical Exam  Constitutional:       General: She is not in acute distress.     Appearance: Normal appearance.   HENT:      Head: Normocephalic.      Right Ear: Tympanic membrane and ear canal normal.      Left Ear: Tympanic membrane and ear canal normal.      Nose: Nose normal.      Mouth/Throat:      Mouth: Mucous membranes are moist.      Pharynx: No oropharyngeal exudate.   Eyes:      Extraocular Movements: Extraocular movements intact.      Pupils: Pupils are equal, round, and reactive to light.   Cardiovascular:      Rate and Rhythm: Normal rate and regular rhythm.      Heart sounds: No murmur heard.  Pulmonary:      Effort: Pulmonary effort is normal.      Breath sounds: Normal breath sounds. No wheezing.   Abdominal:      General: Abdomen is flat. Bowel sounds are normal.      Palpations: Abdomen is soft.      Hernia: No hernia is present.   Musculoskeletal:         General: Normal range of motion.      Cervical back: Normal range of motion and neck  supple.      Right lower leg: No edema.      Left lower leg: No edema.   Lymphadenopathy:      Cervical: No cervical adenopathy.   Skin:     General: Skin is warm and dry.      Coloration: Skin is not jaundiced.      Findings: No lesion.   Neurological:      General: No focal deficit present.      Mental Status: She is alert and oriented to person, place, and time. Mental status is at baseline.      Cranial Nerves: No cranial nerve deficit.      Gait: Gait normal.   Psychiatric:         Mood and Affect: Mood normal.         Behavior: Behavior normal.         Judgment: Judgment normal.        Assessment and Plan (including Health Maintenance)      Problem List  Smart Sets  Document Outside HM   :    Plan:           Health Maintenance Due   Topic Date Due    Cervical Cancer Screening  Never done    Lipid Panel  Never done    COVID-19 Vaccine (1) Never done    HIV Screening  Never done    TETANUS VACCINE  Never done    Mammogram  Never done    Influenza Vaccine (1) 09/01/2022       Problem List Items Addressed This Visit          Oncology    Iron deficiency anemia due to chronic blood loss - Primary    Relevant Orders    CBC Auto Differential (Completed)    Ferritin (Completed)    Iron and TIBC (Completed)    Vitamin B12 (Completed)    Reticulocytes (Completed)       Health Maintenance Topics with due status: Not Due       Topic Last Completion Date    Colorectal Cancer Screening 10/01/2021       No future appointments.     There are no Patient Instructions on file for this visit.  Follow up in about 4 weeks (around 2/20/2023) for routine followup.     Signature:  Antonio Wilson MD      Date of encounter: 1/23/23

## 2023-01-27 PROBLEM — D50.0 IRON DEFICIENCY ANEMIA DUE TO CHRONIC BLOOD LOSS: Status: ACTIVE | Noted: 2023-01-27

## 2023-02-22 DIAGNOSIS — D50.0 IRON DEFICIENCY ANEMIA DUE TO CHRONIC BLOOD LOSS: ICD-10-CM

## 2023-02-22 RX ORDER — FERROUS SULFATE 325(65) MG
325 TABLET, DELAYED RELEASE (ENTERIC COATED) ORAL DAILY
Qty: 90 TABLET | Refills: 3 | Status: SHIPPED | OUTPATIENT
Start: 2023-02-22

## 2023-02-24 ENCOUNTER — OFFICE VISIT (OUTPATIENT)
Dept: FAMILY MEDICINE | Facility: CLINIC | Age: 48
End: 2023-02-24
Payer: OTHER GOVERNMENT

## 2023-02-24 VITALS
DIASTOLIC BLOOD PRESSURE: 84 MMHG | WEIGHT: 174 LBS | HEIGHT: 66 IN | HEART RATE: 78 BPM | BODY MASS INDEX: 27.97 KG/M2 | RESPIRATION RATE: 20 BRPM | OXYGEN SATURATION: 100 % | SYSTOLIC BLOOD PRESSURE: 144 MMHG

## 2023-02-24 DIAGNOSIS — D50.0 IRON DEFICIENCY ANEMIA DUE TO CHRONIC BLOOD LOSS: ICD-10-CM

## 2023-02-24 DIAGNOSIS — D47.2 MGUS (MONOCLONAL GAMMOPATHY OF UNKNOWN SIGNIFICANCE): Primary | ICD-10-CM

## 2023-02-24 PROCEDURE — 99213 OFFICE O/P EST LOW 20 MIN: CPT | Mod: ,,, | Performed by: FAMILY MEDICINE

## 2023-02-24 PROCEDURE — 99213 PR OFFICE/OUTPT VISIT, EST, LEVL III, 20-29 MIN: ICD-10-PCS | Mod: ,,, | Performed by: FAMILY MEDICINE

## 2023-02-24 NOTE — PROGRESS NOTES
Antonio Wilson MD        PATIENT NAME: Laurel Mary  : 1975  DATE: 23  MRN: [id number]      Billing Provider: Antonio Wilson MD  Level of Service: CO OFFICE/OUTPT VISIT, EST, LEVL III, 20-29 MIN  Patient PCP Information       Provider PCP Type    Antonio Wilson MD General            Reason for Visit / Chief Complaint: Referral (Needs referral to hematologist because her platelets are so low.)       Update PCP  Update Chief Complaint         History of Present Illness / Problem Focused Workflow     Laurel Mary presents to the clinic with Referral (Needs referral to hematologist because her platelets are so low.)     Anemia.  Saw Int Med who recommended hematology.      Review of Systems     Review of Systems   Constitutional:  Positive for fatigue. Negative for activity change, appetite change, fever and unexpected weight change.   HENT:  Negative for congestion, rhinorrhea, sinus pressure, sinus pain, sore throat and trouble swallowing.    Eyes:  Negative for photophobia, pain, discharge and visual disturbance.   Respiratory:  Negative for cough, chest tightness, wheezing and stridor.    Cardiovascular:  Negative for chest pain, palpitations and leg swelling.   Gastrointestinal:  Negative for abdominal pain, blood in stool, constipation, diarrhea and nausea.   Endocrine: Negative for polydipsia, polyphagia and polyuria.   Genitourinary:  Negative for difficulty urinating, flank pain and hematuria.   Musculoskeletal:  Negative for arthralgias and neck pain.   Skin:  Negative for rash.   Allergic/Immunologic: Negative for food allergies.   Neurological:  Negative for dizziness, tremors, seizures, syncope, weakness (global weakness) and headaches.   Psychiatric/Behavioral:  Negative for behavioral problems, confusion, decreased concentration, dysphoric mood and hallucinations. The patient is not nervous/anxious.       Medical / Social / Family History     Past Medical History:   Diagnosis Date     CIDP (chronic inflammatory demyelinating polyneuropathy) 3/3/2022    Foot drop     Hypertension     Osteoarthritis        Past Surgical History:   Procedure Laterality Date    ENDOMETRIAL BIOPSY         Social History  Ms.  reports that she has never smoked. She has never been exposed to tobacco smoke. She has never used smokeless tobacco. She reports current alcohol use. She reports that she does not use drugs.    Family History  Ms.'s family history includes Diabetes in her mother; Hypertension in her father and mother; Leukemia in her father.    Medications and Allergies     Medications  No outpatient medications have been marked as taking for the 2/24/23 encounter (Office Visit) with Antonio Wilson MD.       Allergies  Review of patient's allergies indicates:  No Known Allergies    Physical Examination     Vitals:    02/24/23 1030   BP: (!) 144/84   Pulse: 78   Resp: 20     Physical Exam  Constitutional:       General: She is not in acute distress.     Appearance: Normal appearance.   HENT:      Head: Normocephalic.      Right Ear: Tympanic membrane and ear canal normal.      Left Ear: Tympanic membrane and ear canal normal.      Nose: Nose normal.      Mouth/Throat:      Mouth: Mucous membranes are moist.      Pharynx: No oropharyngeal exudate.   Eyes:      Extraocular Movements: Extraocular movements intact.      Pupils: Pupils are equal, round, and reactive to light.   Cardiovascular:      Rate and Rhythm: Normal rate and regular rhythm.      Heart sounds: No murmur heard.  Pulmonary:      Effort: Pulmonary effort is normal.      Breath sounds: Normal breath sounds. No wheezing.   Abdominal:      General: Abdomen is flat. Bowel sounds are normal.      Palpations: Abdomen is soft.      Hernia: No hernia is present.   Musculoskeletal:         General: Normal range of motion.      Cervical back: Normal range of motion and neck supple.      Right lower leg: No edema.      Left lower leg: No edema.    Lymphadenopathy:      Cervical: No cervical adenopathy.   Skin:     General: Skin is warm and dry.      Coloration: Skin is not jaundiced.      Findings: No lesion.   Neurological:      General: No focal deficit present.      Mental Status: She is alert and oriented to person, place, and time.      Cranial Nerves: No cranial nerve deficit.      Gait: Gait normal.   Psychiatric:         Mood and Affect: Mood normal.         Behavior: Behavior normal.         Judgment: Judgment normal.        Assessment and Plan (including Health Maintenance)      Problem List  Smart Sets  Document Outside HM   :    Plan:     The current medical regimen is effective;  continue present plan and medications.      Health Maintenance Due   Topic Date Due    Cervical Cancer Screening  Never done    Lipid Panel  Never done    COVID-19 Vaccine (1) Never done    HIV Screening  Never done    TETANUS VACCINE  Never done    Mammogram  Never done    Hemoglobin A1c (Diabetic Prevention Screening)  Never done    Influenza Vaccine (1) 09/01/2022       Problem List Items Addressed This Visit          Oncology    Iron deficiency anemia due to chronic blood loss     Other Visit Diagnoses       MGUS (monoclonal gammopathy of unknown significance)    -  Primary    Relevant Orders    Protein Electrophoresis, Serum with Reflex LIANNE            Health Maintenance Topics with due status: Not Due       Topic Last Completion Date    Colorectal Cancer Screening 10/01/2021       Future Appointments   Date Time Provider Department Center   3/24/2023 10:30 AM Antonio Wilson MD [facility]      She has just started taking iron.  She is to follow up with us in 1 month for CBC.  There are no Patient Instructions on file for this visit.  Follow up in about 4 weeks (around 3/24/2023) for routine followup.     Signature:  Antonio Wilson MD      Date of encounter: 2/24/23

## 2023-03-09 ENCOUNTER — HOSPITAL ENCOUNTER (EMERGENCY)
Facility: HOSPITAL | Age: 48
Discharge: HOME | End: 2023-03-09
Payer: OTHER GOVERNMENT

## 2023-03-09 VITALS
TEMPERATURE: 98 F | HEART RATE: 101 BPM | SYSTOLIC BLOOD PRESSURE: 160 MMHG | WEIGHT: 175 LBS | BODY MASS INDEX: 28.12 KG/M2 | HEIGHT: 66 IN | OXYGEN SATURATION: 100 % | RESPIRATION RATE: 20 BRPM | DIASTOLIC BLOOD PRESSURE: 99 MMHG

## 2023-03-09 DIAGNOSIS — R52 PAIN: ICD-10-CM

## 2023-03-09 DIAGNOSIS — M19.071 PRIMARY OSTEOARTHRITIS OF RIGHT FOOT: Primary | ICD-10-CM

## 2023-03-09 PROCEDURE — 99283 PR EMERGENCY DEPT VISIT,LEVEL III: ICD-10-PCS | Mod: ,,, | Performed by: NURSE PRACTITIONER

## 2023-03-09 PROCEDURE — 63600175 PHARM REV CODE 636 W HCPCS: Performed by: NURSE PRACTITIONER

## 2023-03-09 PROCEDURE — 99283 EMERGENCY DEPT VISIT LOW MDM: CPT | Mod: ,,, | Performed by: NURSE PRACTITIONER

## 2023-03-09 PROCEDURE — 96372 THER/PROPH/DIAG INJ SC/IM: CPT | Mod: 59 | Performed by: NURSE PRACTITIONER

## 2023-03-09 PROCEDURE — 99284 EMERGENCY DEPT VISIT MOD MDM: CPT | Mod: 25

## 2023-03-09 PROCEDURE — 29515 APPLICATION SHORT LEG SPLINT: CPT

## 2023-03-09 RX ORDER — ORPHENADRINE CITRATE 30 MG/ML
30 INJECTION INTRAMUSCULAR; INTRAVENOUS
Status: COMPLETED | OUTPATIENT
Start: 2023-03-09 | End: 2023-03-09

## 2023-03-09 RX ORDER — IBUPROFEN 800 MG/1
800 TABLET ORAL EVERY 6 HOURS PRN
Qty: 20 TABLET | Refills: 0 | Status: SHIPPED | OUTPATIENT
Start: 2023-03-09

## 2023-03-09 RX ORDER — METHOCARBAMOL 500 MG/1
1000 TABLET, FILM COATED ORAL 3 TIMES DAILY
Qty: 30 TABLET | Refills: 0 | Status: SHIPPED | OUTPATIENT
Start: 2023-03-09 | End: 2023-03-14

## 2023-03-09 RX ORDER — KETOROLAC TROMETHAMINE 30 MG/ML
30 INJECTION, SOLUTION INTRAMUSCULAR; INTRAVENOUS
Status: COMPLETED | OUTPATIENT
Start: 2023-03-09 | End: 2023-03-09

## 2023-03-09 RX ADMIN — ORPHENADRINE CITRATE 30 MG: 30 INJECTION INTRAMUSCULAR; INTRAVENOUS at 03:03

## 2023-03-09 RX ADMIN — KETOROLAC TROMETHAMINE 30 MG: 30 INJECTION, SOLUTION INTRAMUSCULAR; INTRAVENOUS at 03:03

## 2023-03-09 NOTE — ED TRIAGE NOTES
Pain during the night that wakes her up and painful to walk on right foot. Has been dx with plantar fascitis in past with mild swelling to inner ankle. 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

## 2023-03-09 NOTE — ED PROVIDER NOTES
Encounter Date: 3/9/2023       History     Chief Complaint   Patient presents with    Foot Pain     Right      47 year old female presents to ED with complaint of right foot pain. She states she started noticing swelling to foot around ankle and experiencing pain to foot after Sunday night when she got up to go to the bathroom. Monday morning she states she was unable to bear weight to foot. She states on yesterday she was in the bed and she was experiencing throbbing pain to foot. She endorses being followed by Podiatry and Rheumatology and was diagnosed with Charcot joint and needed to be wearing AFOs as well as a neuropathic arthritis.     The history is provided by the patient.   Foot Pain  This is a new problem. The current episode started more than 2 days ago. The problem occurs daily. The problem has not changed since onset.Pertinent negatives include no chest pain and no shortness of breath. The symptoms are aggravated by walking and standing. Nothing relieves the symptoms. She has tried nothing for the symptoms. The treatment provided no relief.   Review of patient's allergies indicates:  No Known Allergies  Past Medical History:   Diagnosis Date    CIDP (chronic inflammatory demyelinating polyneuropathy) 3/3/2022    Foot drop     Hypertension     Osteoarthritis      Past Surgical History:   Procedure Laterality Date    ENDOMETRIAL BIOPSY       Family History   Problem Relation Age of Onset    Hypertension Mother     Diabetes Mother     Hypertension Father     Leukemia Father      Social History     Tobacco Use    Smoking status: Never     Passive exposure: Never    Smokeless tobacco: Never   Substance Use Topics    Alcohol use: Yes     Comment: occ    Drug use: Never     Review of Systems   Constitutional:  Negative for chills and fever.   Respiratory:  Negative for cough and shortness of breath.    Cardiovascular:  Negative for chest pain and palpitations.   Gastrointestinal:  Negative for diarrhea,  nausea and vomiting.   Musculoskeletal:  Positive for arthralgias, gait problem and joint swelling.   Skin:  Negative for color change and wound.   Neurological:  Negative for dizziness and weakness.   All other systems reviewed and are negative.    Physical Exam     Initial Vitals [03/09/23 1452]   BP Pulse Resp Temp SpO2   (!) 174/104 (!) 116 (!) 22 98.4 °F (36.9 °C) 100 %      MAP       --         Physical Exam    Nursing note and vitals reviewed.  Constitutional: She appears well-developed and well-nourished.   HENT:   Head: Normocephalic and atraumatic.   Eyes: EOM are normal. Pupils are equal, round, and reactive to light.   Neck: Neck supple.   Normal range of motion.  Cardiovascular:  Normal rate and regular rhythm.           Pulmonary/Chest: She has no wheezes. She has no rhonchi.   Abdominal: Abdomen is soft. She exhibits no distension. There is no abdominal tenderness.   Musculoskeletal:         General: Tenderness present. No edema.      Cervical back: Normal range of motion and neck supple.      Right foot: Swelling and tenderness present.        Feet:      Neurological: She is alert and oriented to person, place, and time. No cranial nerve deficit or sensory deficit.   Skin: Skin is warm and dry. Capillary refill takes less than 2 seconds.       Medical Screening Exam   See Full Note    ED Course   Procedures  Labs Reviewed - No data to display       Imaging Results              X-Ray Foot Complete Right (Final result)  Result time 03/09/23 15:11:00      Final result by Primo Sotelo II, MD (03/09/23 15:11:00)                   Impression:      Osteoarthrosis as described above.      Electronically signed by: Primo Sotelo  Date:    03/09/2023  Time:    15:11               Narrative:    EXAMINATION:  XR FOOT COMPLETE 3 VIEW RIGHT    CLINICAL HISTORY:  Pain, unspecified    COMPARISON:  None available    TECHNIQUE:  XR FOOT 3 VIEW RIGHT    FINDINGS:  No evidence of fracture seen.  The alignment of  the joints appears normal.  Mild midfoot and mild-to-moderate 1st metatarsophalangeal joint degenerative change is present.  No soft tissue abnormality is seen.                                       Medications   ketorolac injection 30 mg (30 mg Intramuscular Given 3/9/23 1535)   orphenadrine injection 30 mg (30 mg Intramuscular Given 3/9/23 1535)     Medical Decision Making:   History:   Old Records Summarized: other records.       <> Summary of Records: Patient seen by podiatry with need for Royal brace and PT; diagnosis of Charcot foot and dysfunctional tibial tendon  Initial Assessment:   Foot pain  Differential Diagnosis:   Plantar fasciitis  Osteoarthritis   Heel spur  Clinical Tests:   Radiological Study: Ordered and Reviewed  ED Management:  [facility]    Patient presents for emergent evaluation of acute foot pain that poses a threat to life and/or bodily function.    In the ED patient found to have acute osteoarthritis, right foot.    I ordered X-rays and personally reviewed them and reviewed the radiologist interpretation.  Xray significant for Mild midfoot and mild-to-moderate 1st metatarsophalangeal joint degenerative change is present..      Discharge [facility]  Patient was managed in the ED with IM Toradol, Norflex.    The response to treatment was good.    Patient was discharged in stable condition.  Detailed return precautions discussed.  Air stirrup splint and crutches provided; encouraged to f/u with [facility] for [place] brace                 Clinical Impression:   Final diagnoses:  [R52] Pain  [M19.071] Primary osteoarthritis of right foot (Primary)        ED Disposition Condition    Discharge Stable          ED Prescriptions       Medication Sig Dispense Start Date End Date Auth. Provider    methocarbamoL (ROBAXIN) 500 MG Tab Take 2 tablets (1,000 mg total) by mouth 3 (three) times daily. for 5 days 30 tablet 3/9/2023 3/14/2023 JOSE RAMON Collado    ibuprofen (ADVIL,MOTRIN) 800 MG tablet  Take 1 tablet (800 mg total) by mouth every 6 (six) hours as needed for Pain. 20 tablet 3/9/2023 -- JOSE RAMON Collado          Follow-up Information    None          JOSE RAMON Collado  03/10/23 5241

## 2023-03-24 ENCOUNTER — OFFICE VISIT (OUTPATIENT)
Dept: FAMILY MEDICINE | Facility: CLINIC | Age: 48
End: 2023-03-24
Payer: OTHER GOVERNMENT

## 2023-03-24 VITALS
DIASTOLIC BLOOD PRESSURE: 110 MMHG | BODY MASS INDEX: 28.28 KG/M2 | RESPIRATION RATE: 16 BRPM | SYSTOLIC BLOOD PRESSURE: 160 MMHG | TEMPERATURE: 98 F | HEIGHT: 66 IN | WEIGHT: 176 LBS | HEART RATE: 89 BPM | OXYGEN SATURATION: 96 %

## 2023-03-24 DIAGNOSIS — I10 PRIMARY HYPERTENSION: Chronic | ICD-10-CM

## 2023-03-24 DIAGNOSIS — D50.0 IRON DEFICIENCY ANEMIA DUE TO CHRONIC BLOOD LOSS: Primary | ICD-10-CM

## 2023-03-24 LAB
BASOPHILS # BLD AUTO: 0.01 K/UL (ref 0–0.2)
BASOPHILS NFR BLD AUTO: 0.2 % (ref 0–1)
DIFFERENTIAL METHOD BLD: ABNORMAL
EOSINOPHIL # BLD AUTO: 0.03 K/UL (ref 0–0.5)
EOSINOPHIL NFR BLD AUTO: 0.7 % (ref 1–4)
ERYTHROCYTE [DISTWIDTH] IN BLOOD BY AUTOMATED COUNT: 20.3 % (ref 11.5–14.5)
HCT VFR BLD AUTO: 30.5 % (ref 38–47)
HGB BLD-MCNC: 8.8 G/DL (ref 12–16)
IMM GRANULOCYTES # BLD AUTO: 0.01 K/UL (ref 0–0.04)
IMM GRANULOCYTES NFR BLD: 0.2 % (ref 0–0.4)
LYMPHOCYTES # BLD AUTO: 1.27 K/UL (ref 1–4.8)
LYMPHOCYTES NFR BLD AUTO: 28.2 % (ref 27–41)
MCH RBC QN AUTO: 22.6 PG (ref 27–31)
MCHC RBC AUTO-ENTMCNC: 28.9 G/DL (ref 32–36)
MCV RBC AUTO: 78.4 FL (ref 80–96)
MONOCYTES # BLD AUTO: 0.64 K/UL (ref 0–0.8)
MONOCYTES NFR BLD AUTO: 14.2 % (ref 2–6)
MPC BLD CALC-MCNC: 8.3 FL (ref 9.4–12.4)
NEUTROPHILS # BLD AUTO: 2.55 K/UL (ref 1.8–7.7)
NEUTROPHILS NFR BLD AUTO: 56.5 % (ref 53–65)
NRBC # BLD AUTO: 0 X10E3/UL
NRBC, AUTO (.00): 0 %
PLATELET # BLD AUTO: 527 K/UL (ref 150–400)
RBC # BLD AUTO: 3.89 M/UL (ref 4.2–5.4)
WBC # BLD AUTO: 4.51 K/UL (ref 4.5–11)

## 2023-03-24 PROCEDURE — 85025 COMPLETE CBC W/AUTO DIFF WBC: CPT | Mod: ,,, | Performed by: CLINICAL MEDICAL LABORATORY

## 2023-03-24 PROCEDURE — 99213 PR OFFICE/OUTPT VISIT, EST, LEVL III, 20-29 MIN: ICD-10-PCS | Mod: ,,, | Performed by: FAMILY MEDICINE

## 2023-03-24 PROCEDURE — 85025 CBC WITH DIFFERENTIAL: ICD-10-PCS | Mod: ,,, | Performed by: CLINICAL MEDICAL LABORATORY

## 2023-03-24 PROCEDURE — 99213 OFFICE O/P EST LOW 20 MIN: CPT | Mod: ,,, | Performed by: FAMILY MEDICINE

## 2023-03-24 RX ORDER — POTASSIUM CHLORIDE 20 MEQ/1
20 TABLET, EXTENDED RELEASE ORAL
COMMUNITY
Start: 2023-02-28

## 2023-03-24 NOTE — PROGRESS NOTES
Antonio Wilson MD        PATIENT NAME: Laurel Mary  : 1975  DATE: 3/24/23  MRN: [id number]      Billing Provider: Antonio Wilson MD  Level of Service: DC OFFICE/OUTPT VISIT, EST, LEVL III, 20-29 MIN  Patient PCP Information       Provider PCP Type    Antonio Wilson MD General            Reason for Visit / Chief Complaint: Anemia (One month check did not get referral to hematology)       Update PCP  Update Chief Complaint         History of Present Illness / Problem Focused Workflow     Laurel Mary presents to the clinic with Anemia (One month check did not get referral to hematology)     Routine followup.  No significant interval change.      Anemia  There has been no abdominal pain, confusion, fever or palpitations.   Review of Systems     Review of Systems   Constitutional:  Positive for fatigue. Negative for activity change, appetite change, fever and unexpected weight change.   HENT:  Negative for congestion, rhinorrhea, sinus pressure, sinus pain, sore throat and trouble swallowing.    Eyes:  Negative for photophobia, pain, discharge and visual disturbance.   Respiratory:  Negative for cough, chest tightness, wheezing and stridor.    Cardiovascular:  Negative for chest pain, palpitations and leg swelling.   Gastrointestinal:  Negative for abdominal pain, blood in stool, constipation, diarrhea and nausea.   Endocrine: Negative for polydipsia, polyphagia and polyuria.   Genitourinary:  Negative for difficulty urinating, flank pain and hematuria.   Musculoskeletal:  Positive for arthralgias and gait problem. Negative for neck pain.   Skin:  Negative for rash.   Allergic/Immunologic: Negative for food allergies.   Neurological:  Negative for dizziness, tremors, seizures, syncope, weakness (global weakness) and headaches.   Psychiatric/Behavioral:  Negative for behavioral problems, confusion, decreased concentration, dysphoric mood and hallucinations. The patient is not nervous/anxious.        Medical / Social / Family History     Past Medical History:   Diagnosis Date    CIDP (chronic inflammatory demyelinating polyneuropathy) 3/3/2022    Foot drop     Hypertension     Osteoarthritis        Past Surgical History:   Procedure Laterality Date    ENDOMETRIAL BIOPSY         Social History  Ms.  reports that she has never smoked. She has never been exposed to tobacco smoke. She has never used smokeless tobacco. She reports current alcohol use. She reports that she does not use drugs.    Family History  Ms.'s family history includes Diabetes in her mother; Hypertension in her father and mother; Leukemia in her father.    Medications and Allergies     Medications  Outpatient Medications Marked as Taking for the 3/24/23 encounter (Office Visit) with Antonio Wilson MD   Medication Sig Dispense Refill    potassium chloride SA (K-DUR,KLOR-CON) 20 MEQ tablet 20 mEq.         Allergies  Review of patient's allergies indicates:  No Known Allergies    Physical Examination     Vitals:    03/24/23 1055   BP: (!) 160/110   Pulse: 89   Resp: 16   Temp: 98.1 °F (36.7 °C)     Physical Exam  Constitutional:       General: She is not in acute distress.     Appearance: Normal appearance.   HENT:      Head: Normocephalic.      Right Ear: Tympanic membrane and ear canal normal.      Left Ear: Tympanic membrane and ear canal normal.      Nose: Nose normal.      Mouth/Throat:      Mouth: Mucous membranes are moist.      Pharynx: No oropharyngeal exudate.   Eyes:      Extraocular Movements: Extraocular movements intact.      Pupils: Pupils are equal, round, and reactive to light.   Cardiovascular:      Rate and Rhythm: Normal rate and regular rhythm.      Heart sounds: No murmur heard.  Pulmonary:      Effort: Pulmonary effort is normal.      Breath sounds: Normal breath sounds. No wheezing.   Abdominal:      General: Abdomen is flat. Bowel sounds are normal.      Palpations: Abdomen is soft.      Hernia: No hernia is present.    Musculoskeletal:         General: Normal range of motion.      Cervical back: Normal range of motion and neck supple.      Right lower leg: No edema.      Left lower leg: No edema.   Lymphadenopathy:      Cervical: No cervical adenopathy.   Skin:     General: Skin is warm and dry.      Coloration: Skin is not jaundiced.      Findings: No lesion.   Neurological:      General: No focal deficit present.      Mental Status: She is alert and oriented to person, place, and time.      Cranial Nerves: No cranial nerve deficit.      Gait: Gait normal.   Psychiatric:         Mood and Affect: Mood normal.         Behavior: Behavior normal.         Judgment: Judgment normal.        Assessment and Plan (including Health Maintenance)      Problem List  Smart [place]  Document Outside HM   :    Plan:     The current medical regimen is effective;  continue present plan and medications.      Health Maintenance Due   Topic Date Due    Cervical Cancer Screening  Never done    Lipid Panel  Never done    COVID-19 Vaccine (1) Never done    HIV Screening  Never done    TETANUS VACCINE  Never done    Mammogram  Never done    Hemoglobin A1c (Diabetic Prevention Screening)  Never done    Colorectal Cancer Screening  Never done    Influenza Vaccine (1) 09/01/2022       Problem List Items Addressed This Visit          Cardiac/Vascular    Hypertension (Chronic)       Oncology    Iron deficiency anemia due to chronic blood loss - Primary    Relevant Orders    CBC Auto Differential (Completed)       The patient has no Health Maintenance topics of status Not Due    Future Appointments   Date Time Provider Department Center   4/11/2023  9:00 AM [facility]         There are no Patient Instructions on file for this visit.  Follow up if symptoms worsen or fail to improve.     Signature:  Antonio Wilson MD      Date of encounter: 3/24/23

## 2023-03-27 ENCOUNTER — OFFICE VISIT (OUTPATIENT)
Dept: CARDIOLOGY | Facility: CLINIC | Age: 48
End: 2023-03-27
Payer: OTHER GOVERNMENT

## 2023-03-27 DIAGNOSIS — I10 HYPERTENSION, UNSPECIFIED TYPE: Primary | ICD-10-CM

## 2023-03-27 DIAGNOSIS — I73.9 CLAUDICATION: ICD-10-CM

## 2023-03-27 PROCEDURE — 93005 ELECTROCARDIOGRAM TRACING: CPT | Mod: PBBFAC | Performed by: STUDENT IN AN ORGANIZED HEALTH CARE EDUCATION/TRAINING PROGRAM

## 2023-03-27 PROCEDURE — 93010 EKG 12-LEAD: ICD-10-PCS | Mod: S$PBB,,, | Performed by: STUDENT IN AN ORGANIZED HEALTH CARE EDUCATION/TRAINING PROGRAM

## 2023-03-27 PROCEDURE — 99213 PR OFFICE/OUTPT VISIT, EST, LEVL III, 20-29 MIN: ICD-10-PCS | Mod: S$PBB,,, | Performed by: STUDENT IN AN ORGANIZED HEALTH CARE EDUCATION/TRAINING PROGRAM

## 2023-03-27 PROCEDURE — 99213 OFFICE O/P EST LOW 20 MIN: CPT | Mod: S$PBB,,, | Performed by: STUDENT IN AN ORGANIZED HEALTH CARE EDUCATION/TRAINING PROGRAM

## 2023-03-27 PROCEDURE — 93010 ELECTROCARDIOGRAM REPORT: CPT | Mod: S$PBB,,, | Performed by: STUDENT IN AN ORGANIZED HEALTH CARE EDUCATION/TRAINING PROGRAM

## 2023-03-27 PROCEDURE — 99212 OFFICE O/P EST SF 10 MIN: CPT | Mod: PBBFAC | Performed by: STUDENT IN AN ORGANIZED HEALTH CARE EDUCATION/TRAINING PROGRAM

## 2023-03-27 NOTE — PROGRESS NOTES
PCP: Antonio Wilson MD    Referring Provider: Antonio Wilson MD    Subjective:   Laurel Mary is a 47 y.o. female with hx of HTN, OA and suspected chronic inflammatory demyelinating neuropathy who presents for a follow-up visit    12/21/2022: Referred to cardiology for evaluation of chest pain. She endorses a longstanding hx of intermittent episodes of chest pain- at times like chest tightness with and without exertion; as well as occasional sharp and stabbing chest pain. She went to the ED recently on 11/8/2022 with chest pain radiating to right shoulder and jaw. ECG with no acute ischemic changes. High sensitivity troponin was borderline elevated at 63.7 but flat. She was discharged home. Of, she is also anemic with her last Hb noted to be 7.8 on 11/8/22. Iron studies consistent with iron deficiency anemia. Denies melena or BRBPR and notes a negative colonoscopy. She denies dyspnea on exertion, dizziness/lightheadedness, syncopal episodes, palpitations, orthopnea, PND or leg swelling. ROS positive for fatigue.    03/27/2023:  Doing well. Denies any chest pain/tightness, dyspnea, palpitations, lightheadedness or syncope.     Fhx: Father (CAD s/p CABG), Mother (DM, HTN)    Shx: Never smoker. No illicit drug use. Drinks alcohol socially.     EKG  3727/23: Sinus tachycardia, possible possible LVH  12/21/2022: Sinus tachycardia, left axis deviation, poor R wave progression  ECHO No results found for this or any previous visit.    C No results found for this or any previous visit.      NM MPI 1/5/23  Impression:     1.  Scintigraphically negative for ischemia or infarct.  2. the global left ventricular systolic function is normal with an LV ejection fraction of 60 %.  No evidence of TID.       Lab Results   Component Value Date     11/08/2022    K 3.6 11/08/2022     11/08/2022    CO2 27 11/08/2022    BUN 16 11/08/2022    CREATININE 0.82 11/08/2022    CALCIUM 8.2 (L) 11/08/2022    ANIONGAP 13 11/08/2022     EGFRNONAA 61 01/14/2022       No results found for: CHOL  No results found for: HDL  No results found for: LDLCALC  No results found for: TRIG  No results found for: CHOLHDL    Lab Results   Component Value Date    WBC 4.91 06/20/2023    HGB 10.1 (L) 06/20/2023    HCT 33.0 (L) 06/20/2023    MCV 79.3 (L) 06/20/2023     (H) 06/20/2023           Current Outpatient Medications:     amLODIPine (NORVASC) 5 MG tablet, Take 1 tablet (5 mg total) by mouth once daily., Disp: 30 tablet, Rfl: 0    aspirin (ECOTRIN) 81 MG EC tablet, Take 1 tablet (81 mg total) by mouth once daily., Disp: 90 tablet, Rfl: 1    aspirin/salicylamide/caffeine (BC HEADACHE POWDER ORAL), Take by mouth., Disp: , Rfl:     ferrous sulfate 325 (65 FE) MG EC tablet, Take 1 tablet (325 mg total) by mouth once daily., Disp: 90 tablet, Rfl: 3    ibuprofen (ADVIL,MOTRIN) 800 MG tablet, Take 1 tablet (800 mg total) by mouth every 6 (six) hours as needed for Pain., Disp: 20 tablet, Rfl: 0    losartan (COZAAR) 100 MG tablet, Take 1 tablet (100 mg total) by mouth once daily., Disp: 90 tablet, Rfl: 3    potassium chloride SA (K-DUR,KLOR-CON) 20 MEQ tablet, 20 mEq., Disp: , Rfl:     Review of Systems   Constitutional:  Positive for malaise/fatigue. Negative for chills and fever.   Respiratory:  Negative for cough, hemoptysis and wheezing.    Cardiovascular:  Negative for chest pain, palpitations, orthopnea, claudication, leg swelling and PND.   Gastrointestinal:  Negative for abdominal pain, heartburn, nausea and vomiting.       Objective:   There were no vitals taken for this visit.    Physical Exam  Constitutional:       General: She is not in acute distress.     Appearance: Normal appearance.   HENT:      Head: Normocephalic and atraumatic.   Cardiovascular:      Rate and Rhythm: Normal rate and regular rhythm.      Pulses: Normal pulses.      Heart sounds: Normal heart sounds. No murmur heard.    No friction rub. No gallop.   Pulmonary:      Effort:  Pulmonary effort is normal. No respiratory distress.      Breath sounds: Normal breath sounds. No wheezing or rales.   Skin:     General: Skin is warm and dry.   Neurological:      Mental Status: She is alert.         Assessment:     1. Hypertension, unspecified type  EKG 12-lead    EKG 12-lead      2. Claudication  CANCELED: US Lower Extrem Arteries Bilat with AGBBY (xpd)            Plan:   No problem-specific Assessment & Plan notes found for this encounter.      Atypical chest pain   Stress test negative for ischemia or infarct    HTN  Please take amlodipine 5 mg daily and losartan 100 mg daily   Please maintain BP log     F/U in 3 months

## 2023-03-27 NOTE — PATIENT INSTRUCTIONS
Check bilateral ABIs  Please take amlodipine 5 mg daily and losartan 100 mg daily   Please maintain BP log   F/U in 3 months

## 2023-04-04 ENCOUNTER — PATIENT MESSAGE (OUTPATIENT)
Dept: RESEARCH | Facility: HOSPITAL | Age: 48
End: 2023-04-04

## 2023-04-11 ENCOUNTER — HOSPITAL ENCOUNTER (OUTPATIENT)
Dept: RADIOLOGY | Facility: HOSPITAL | Age: 48
Discharge: HOME | End: 2023-04-11
Attending: STUDENT IN AN ORGANIZED HEALTH CARE EDUCATION/TRAINING PROGRAM
Payer: OTHER GOVERNMENT

## 2023-04-11 DIAGNOSIS — I73.9 CLAUDICATION: ICD-10-CM

## 2023-04-11 PROCEDURE — 93924 LWR XTR VASC STDY BILAT: CPT | Mod: 26,,, | Performed by: SURGERY

## 2023-04-11 PROCEDURE — 93924 LWR XTR VASC STDY BILAT: CPT | Mod: TC

## 2023-04-11 PROCEDURE — 93924 US ARTERIAL DOPPLER W/ EXERCISE: ICD-10-PCS | Mod: 26,,, | Performed by: SURGERY

## 2023-05-12 ENCOUNTER — PATIENT OUTREACH (OUTPATIENT)
Dept: ADMINISTRATIVE | Facility: HOSPITAL | Age: 48
End: 2023-05-12

## 2023-05-12 NOTE — PROGRESS NOTES
Non-compliant report chart audits for CERVICAL CANCER SCREENING. Chart review completed for [facility] test overdue (mammograms, Colonoscopies, pap smears, DM labs, and/or EYE EXAMs)      Care Everywhere and media, updates requested and reviewed. [facility] and [facility] reviewed. [facility] and [facility] Reviewed.     Care Everywhere shows Pap done with Dr. Gutierrezever June 2022. Records requested.    Care Team Updated.     Outreach:  Cervical cancer screening

## 2023-05-12 NOTE — LETTER
AUTHORIZATION FOR RELEASE OF   CONFIDENTIAL INFORMATION    Dear [facility],    We are seeing Laurel Mary, date of birth 1975, in the clinic at [facility]. Antonio Wilson MD is the patient's PCP. Laurel Mary has an outstanding lab/procedure at the time we reviewed her chart. In order to help keep her health information updated, she has authorized us to request the following medical record(s):        (  )  MAMMOGRAM                                      (  )  COLONOSCOPY      ( x )  PAP SMEAR/RESULTS                                               (  )  DEXA SCAN                                          (  )  EYE EXAM            (  )  FOOT EXAM                                          (  )  ENTIRE RECORD     (  )  OUTSIDE IMMUNIZATIONS                 (  )  Pap with [facility] 2022         Please fax records to Sheila Ge LPN Care Coordinator at [phone].     If you have any questions, please contact Sheila at [phone].          Patient Name: Laurel Mary  : 1975  Patient Phone #: [phone]

## 2023-05-19 ENCOUNTER — OFFICE VISIT (OUTPATIENT)
Dept: FAMILY MEDICINE | Facility: CLINIC | Age: 48
End: 2023-05-19
Payer: OTHER GOVERNMENT

## 2023-05-19 VITALS
HEART RATE: 93 BPM | HEIGHT: 66 IN | WEIGHT: 165.63 LBS | RESPIRATION RATE: 16 BRPM | OXYGEN SATURATION: 97 % | TEMPERATURE: 98 F | BODY MASS INDEX: 26.62 KG/M2 | SYSTOLIC BLOOD PRESSURE: 160 MMHG | DIASTOLIC BLOOD PRESSURE: 100 MMHG

## 2023-05-19 DIAGNOSIS — S90.414A ABRASION OF TOE OF RIGHT FOOT, INITIAL ENCOUNTER: ICD-10-CM

## 2023-05-19 DIAGNOSIS — D64.9 ANEMIA, UNSPECIFIED TYPE: Primary | ICD-10-CM

## 2023-05-19 LAB
BASOPHILS # BLD AUTO: 0.02 K/UL (ref 0–0.2)
BASOPHILS NFR BLD AUTO: 0.5 % (ref 0–1)
DIFFERENTIAL METHOD BLD: ABNORMAL
EOSINOPHIL # BLD AUTO: 0.06 K/UL (ref 0–0.5)
EOSINOPHIL NFR BLD AUTO: 1.4 % (ref 1–4)
ERYTHROCYTE [DISTWIDTH] IN BLOOD BY AUTOMATED COUNT: 21.1 % (ref 11.5–14.5)
HCT VFR BLD AUTO: 30.5 % (ref 38–47)
HGB BLD-MCNC: 9.2 G/DL (ref 12–16)
IMM GRANULOCYTES # BLD AUTO: 0.02 K/UL (ref 0–0.04)
IMM GRANULOCYTES NFR BLD: 0.5 % (ref 0–0.4)
LYMPHOCYTES # BLD AUTO: 1.41 K/UL (ref 1–4.8)
LYMPHOCYTES NFR BLD AUTO: 32.2 % (ref 27–41)
MCH RBC QN AUTO: 23.9 PG (ref 27–31)
MCHC RBC AUTO-ENTMCNC: 30.2 G/DL (ref 32–36)
MCV RBC AUTO: 79.2 FL (ref 80–96)
MONOCYTES # BLD AUTO: 0.68 K/UL (ref 0–0.8)
MONOCYTES NFR BLD AUTO: 15.5 % (ref 2–6)
MPC BLD CALC-MCNC: 8.3 FL (ref 9.4–12.4)
NEUTROPHILS # BLD AUTO: 2.19 K/UL (ref 1.8–7.7)
NEUTROPHILS NFR BLD AUTO: 49.9 % (ref 53–65)
NRBC # BLD AUTO: 0 X10E3/UL
NRBC, AUTO (.00): 0 %
PLATELET # BLD AUTO: 437 K/UL (ref 150–400)
RBC # BLD AUTO: 3.85 M/UL (ref 4.2–5.4)
WBC # BLD AUTO: 4.38 K/UL (ref 4.5–11)

## 2023-05-19 PROCEDURE — 85025 CBC WITH DIFFERENTIAL: ICD-10-PCS | Mod: ,,, | Performed by: CLINICAL MEDICAL LABORATORY

## 2023-05-19 PROCEDURE — 99213 OFFICE O/P EST LOW 20 MIN: CPT | Mod: ,,, | Performed by: FAMILY MEDICINE

## 2023-05-19 PROCEDURE — 85025 COMPLETE CBC W/AUTO DIFF WBC: CPT | Mod: ,,, | Performed by: CLINICAL MEDICAL LABORATORY

## 2023-05-19 PROCEDURE — 99213 PR OFFICE/OUTPT VISIT, EST, LEVL III, 20-29 MIN: ICD-10-PCS | Mod: ,,, | Performed by: FAMILY MEDICINE

## 2023-05-19 NOTE — PROGRESS NOTES
Antonio Wilson MD        PATIENT NAME: Laurel Mary  : 1975  DATE: 23  MRN: [id number]      Billing Provider: Antonio Wilson MD  Level of Service: [place] OFFICE/OUTPT VISIT, EST, LEVL III, 20-29 MIN  Patient PCP Information       Provider PCP Type    Antonio Wilson MD General            Reason for Visit / Chief Complaint: Foot Pain (Pain in swelling in feet )       Update PCP  Update Chief Complaint         History of Present Illness / Problem Focused Workflow     Laurel Mary presents to the clinic with Foot Pain (Pain in swelling in feet )     Has developed abrasion ulcer base of R second toe.  Also follow-up anemia.    Foot Pain  Pertinent negatives include no abdominal pain, arthralgias, chest pain, congestion, coughing, fever, headaches, nausea, neck pain, rash, sore throat or weakness (global weakness).   Review of Systems     Review of Systems   Constitutional:  Negative for activity change, appetite change, fever and unexpected weight change.   HENT:  Negative for congestion, rhinorrhea, sinus pressure, sinus pain, sore throat and trouble swallowing.    Eyes:  Negative for photophobia, pain, discharge and visual disturbance.   Respiratory:  Negative for cough, chest tightness, wheezing and stridor.    Cardiovascular:  Negative for chest pain, palpitations and leg swelling.   Gastrointestinal:  Negative for abdominal pain, blood in stool, constipation, diarrhea and nausea.   Endocrine: Negative for polydipsia, polyphagia and polyuria.   Genitourinary:  Negative for difficulty urinating, flank pain and hematuria.   Musculoskeletal:  Negative for arthralgias and neck pain.   Skin:  Negative for rash.   Allergic/Immunologic: Negative for food allergies.   Neurological:  Negative for dizziness, tremors, seizures, syncope, weakness (global weakness) and headaches.   Psychiatric/Behavioral:  Negative for behavioral problems, confusion, decreased concentration, dysphoric mood and hallucinations.  The patient is not nervous/anxious.       Medical / Social / Family History     Past Medical History:   Diagnosis Date    CIDP (chronic inflammatory demyelinating polyneuropathy) 3/3/2022    Foot drop     Hypertension     Osteoarthritis        Past Surgical History:   Procedure Laterality Date    ENDOMETRIAL BIOPSY         Social History  Ms.  reports that she has never smoked. She has never been exposed to tobacco smoke. She has never used smokeless tobacco. She reports current alcohol use. She reports that she does not use drugs.    Family History  Ms.'s family history includes Diabetes in her mother; Hypertension in her father and mother; Leukemia in her father.    Medications and Allergies     Medications  No outpatient medications have been marked as taking for the 5/19/23 encounter (Office Visit) with Antonio Wilson MD.       Allergies  Review of patient's allergies indicates:  No Known Allergies    Physical Examination     Vitals:    05/19/23 0903   BP: (!) 160/100   Pulse: 93   Resp: 16   Temp: 98.3 °F (36.8 °C)     Physical Exam  Constitutional:       General: She is not in acute distress.     Appearance: Normal appearance.   HENT:      Head: Normocephalic.      Right Ear: Tympanic membrane and ear canal normal.      Left Ear: Tympanic membrane and ear canal normal.      Nose: Nose normal.      Mouth/Throat:      Mouth: Mucous membranes are moist.      Pharynx: No oropharyngeal exudate.   Eyes:      Extraocular Movements: Extraocular movements intact.      Pupils: Pupils are equal, round, and reactive to light.   Cardiovascular:      Rate and Rhythm: Normal rate and regular rhythm.      Heart sounds: No murmur heard.  Pulmonary:      Effort: Pulmonary effort is normal.      Breath sounds: Normal breath sounds. No wheezing.   Abdominal:      General: Abdomen is flat. Bowel sounds are normal.      Palpations: Abdomen is soft.      Hernia: No hernia is present.   Musculoskeletal:         General: Normal  range of motion.      Cervical back: Normal range of motion and neck supple.      Right lower leg: No edema.      Left lower leg: No edema.   Lymphadenopathy:      Cervical: No cervical adenopathy.   Skin:     General: Skin is warm and dry.      Coloration: Skin is not jaundiced.      Findings: Lesion present.      Comments: 0.5 cm abrasion partial skin thickness at the base of the 2nd toe plantar surface.   Neurological:      General: No focal deficit present.      Mental Status: She is alert and oriented to person, place, and time.      Cranial Nerves: No cranial nerve deficit.      Gait: Gait normal.   Psychiatric:         Mood and Affect: Mood normal.         Behavior: Behavior normal.         Judgment: Judgment normal.        Assessment and Plan (including Health Maintenance)      Problem List  Smart Sets  Document Outside HM   :    Plan:           Health Maintenance Due   Topic Date Due    Cervical Cancer Screening  Never done    Lipid Panel  Never done    COVID-19 Vaccine (1) Never done    HIV Screening  Never done    TETANUS VACCINE  Never done    Mammogram  Never done    Hemoglobin A1c (Diabetic Prevention Screening)  Never done    Colorectal Cancer Screening  Never done       Problem List Items Addressed This Visit    None  Visit Diagnoses       Anemia, unspecified type    -  Primary    Relevant Orders    CBC Auto Differential (Completed)    Abrasion of toe of right foot, initial encounter                Health Maintenance Topics with due status: Not Due       Topic Last Completion Date    Influenza Vaccine 01/22/2020       Future Appointments   Date Time Provider Department Center   6/19/2023  1:10 PM Antonio Wilson MD [facility]        There are no Patient Instructions on file for this visit.  Follow up if symptoms worsen or fail to improve.     Signature:  Antonio Wilson MD      Date of encounter: 5/19/23

## 2023-06-01 ENCOUNTER — PATIENT OUTREACH (OUTPATIENT)
Dept: ADMINISTRATIVE | Facility: HOSPITAL | Age: 48
End: 2023-06-01

## 2023-06-05 DIAGNOSIS — D64.9 ANEMIA, UNSPECIFIED TYPE: Primary | ICD-10-CM

## 2023-06-20 ENCOUNTER — OFFICE VISIT (OUTPATIENT)
Dept: FAMILY MEDICINE | Facility: CLINIC | Age: 48
End: 2023-06-20
Payer: OTHER GOVERNMENT

## 2023-06-20 VITALS
HEIGHT: 66 IN | WEIGHT: 160 LBS | HEART RATE: 103 BPM | OXYGEN SATURATION: 95 % | TEMPERATURE: 98 F | RESPIRATION RATE: 16 BRPM | SYSTOLIC BLOOD PRESSURE: 110 MMHG | DIASTOLIC BLOOD PRESSURE: 80 MMHG | BODY MASS INDEX: 25.71 KG/M2

## 2023-06-20 DIAGNOSIS — I10 PRIMARY HYPERTENSION: Chronic | ICD-10-CM

## 2023-06-20 DIAGNOSIS — Q66.6 PES PLANOVALGUS: ICD-10-CM

## 2023-06-20 DIAGNOSIS — D64.9 ANEMIA, UNSPECIFIED TYPE: Primary | ICD-10-CM

## 2023-06-20 DIAGNOSIS — G61.81 CIDP (CHRONIC INFLAMMATORY DEMYELINATING POLYNEUROPATHY): ICD-10-CM

## 2023-06-20 LAB
BASOPHILS # BLD AUTO: 0.01 K/UL (ref 0–0.2)
BASOPHILS NFR BLD AUTO: 0.2 % (ref 0–1)
DIFFERENTIAL METHOD BLD: ABNORMAL
EOSINOPHIL # BLD AUTO: 0.08 K/UL (ref 0–0.5)
EOSINOPHIL NFR BLD AUTO: 1.6 % (ref 1–4)
ERYTHROCYTE [DISTWIDTH] IN BLOOD BY AUTOMATED COUNT: 19 % (ref 11.5–14.5)
FERRITIN SERPL-MCNC: 10 NG/ML (ref 8–252)
HCT VFR BLD AUTO: 33 % (ref 38–47)
HGB BLD-MCNC: 10.1 G/DL (ref 12–16)
IMM GRANULOCYTES # BLD AUTO: 0.01 K/UL (ref 0–0.04)
IMM GRANULOCYTES NFR BLD: 0.2 % (ref 0–0.4)
IMM RETICS NFR: 29.8 % (ref 3–15.9)
IRON SATN MFR SERPL: 8 % (ref 14–50)
IRON SERPL-MCNC: 34 ΜG/DL (ref 50–170)
LYMPHOCYTES # BLD AUTO: 1.6 K/UL (ref 1–4.8)
LYMPHOCYTES NFR BLD AUTO: 32.6 % (ref 27–41)
MCH RBC QN AUTO: 24.3 PG (ref 27–31)
MCHC RBC AUTO-ENTMCNC: 30.6 G/DL (ref 32–36)
MCV RBC AUTO: 79.3 FL (ref 80–96)
MONOCYTES # BLD AUTO: 0.61 K/UL (ref 0–0.8)
MONOCYTES NFR BLD AUTO: 12.4 % (ref 2–6)
MPC BLD CALC-MCNC: 8.6 FL (ref 9.4–12.4)
NEUTROPHILS # BLD AUTO: 2.6 K/UL (ref 1.8–7.7)
NEUTROPHILS NFR BLD AUTO: 53 % (ref 53–65)
NRBC # BLD AUTO: 0 X10E3/UL
NRBC, AUTO (.00): 0 %
PLATELET # BLD AUTO: 498 K/UL (ref 150–400)
RBC # BLD AUTO: 4.16 M/UL (ref 4.2–5.4)
RETICS # AUTO: 0.06 X10E6/UL (ref 0.02–0.11)
RETICS/RBC NFR AUTO: 1.4 % (ref 0.4–2.2)
TIBC SERPL-MCNC: 438 ΜG/DL (ref 250–450)
VIT B12 SERPL-MCNC: 366 PG/ML (ref 193–986)
WBC # BLD AUTO: 4.91 K/UL (ref 4.5–11)

## 2023-06-20 PROCEDURE — 82728 FERRITIN: ICD-10-PCS | Mod: ,,, | Performed by: CLINICAL MEDICAL LABORATORY

## 2023-06-20 PROCEDURE — 83540 IRON AND TIBC: ICD-10-PCS | Mod: ,,, | Performed by: CLINICAL MEDICAL LABORATORY

## 2023-06-20 PROCEDURE — 85025 COMPLETE CBC W/AUTO DIFF WBC: CPT | Mod: ,,, | Performed by: CLINICAL MEDICAL LABORATORY

## 2023-06-20 PROCEDURE — 83540 ASSAY OF IRON: CPT | Mod: ,,, | Performed by: CLINICAL MEDICAL LABORATORY

## 2023-06-20 PROCEDURE — 82607 VITAMIN B12: ICD-10-PCS | Mod: ,,, | Performed by: CLINICAL MEDICAL LABORATORY

## 2023-06-20 PROCEDURE — 99213 PR OFFICE/OUTPT VISIT, EST, LEVL III, 20-29 MIN: ICD-10-PCS | Mod: ,,, | Performed by: FAMILY MEDICINE

## 2023-06-20 PROCEDURE — 85045 AUTOMATED RETICULOCYTE COUNT: CPT | Mod: ,,, | Performed by: CLINICAL MEDICAL LABORATORY

## 2023-06-20 PROCEDURE — 82607 VITAMIN B-12: CPT | Mod: ,,, | Performed by: CLINICAL MEDICAL LABORATORY

## 2023-06-20 PROCEDURE — 83550 IRON AND TIBC: ICD-10-PCS | Mod: ,,, | Performed by: CLINICAL MEDICAL LABORATORY

## 2023-06-20 PROCEDURE — 83550 IRON BINDING TEST: CPT | Mod: ,,, | Performed by: CLINICAL MEDICAL LABORATORY

## 2023-06-20 PROCEDURE — 85025 CBC WITH DIFFERENTIAL: ICD-10-PCS | Mod: ,,, | Performed by: CLINICAL MEDICAL LABORATORY

## 2023-06-20 PROCEDURE — 82728 ASSAY OF FERRITIN: CPT | Mod: ,,, | Performed by: CLINICAL MEDICAL LABORATORY

## 2023-06-20 PROCEDURE — 99213 OFFICE O/P EST LOW 20 MIN: CPT | Mod: ,,, | Performed by: FAMILY MEDICINE

## 2023-06-20 PROCEDURE — 85045 RETICULOCYTES: ICD-10-PCS | Mod: ,,, | Performed by: CLINICAL MEDICAL LABORATORY

## 2023-06-20 NOTE — PROGRESS NOTES
Antonio Wilson MD        PATIENT NAME: Laurel Mary  : 1975  DATE: 23  MRN: [id number]      Billing Provider: Antonio Wilson MD  Level of Service: [place] OFFICE/OUTPT VISIT, EST, LEVL III, 20-29 MIN  Patient PCP Information       Provider PCP Type    Antonio Wilson MD General            Reason for Visit / Chief Complaint: Neurologic Problem (Discuss neurology ) and Altered Mental Status (Psychology thinks had some delirium while in the service when she had septic issues)       Update PCP  Update Chief Complaint         History of Present Illness / Problem Focused Workflow     Laurel Mary presents to the clinic with Neurologic Problem (Discuss neurology ) and Altered Mental Status (Psychology thinks had some delirium while in the service when she had septic issues)     Overall followup.  Has MGUS.  Waiting for Oncology referral.  [place] psychologist.  In therapy for driving phobia.  MRI has lesions on brain and ? MS.  Neurology ruled that out.  Neurology wants to see her again.  ? T2 hypersensitivity and gammopathy.  Dr. Hammond says no driving phobia and has visual /spatial problem.  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  [facility].  What about encephalopathy.  In ;93 had breathing problems.  (Pressured speech)  [facility]  [facility].  Needs surgery for fibroids at 14 week size.     Neurologic Problem  The patient's primary symptoms include an altered mental status and weakness (global weakness). The patient's pertinent negatives include no syncope. Associated symptoms include fatigue. Pertinent negatives include no abdominal pain, chest pain, confusion, dizziness, fever, headaches, nausea, neck pain or palpitations.   Altered Mental Status  Associated symptoms include arthralgias, fatigue, numbness and weakness (global weakness). Pertinent negatives include no abdominal pain, chest pain, congestion, coughing, fever, headaches, nausea, neck pain, rash or sore throat.     Review of  Systems     Review of Systems   Constitutional:  Positive for fatigue. Negative for activity change, appetite change, fever and unexpected weight change.   HENT:  Negative for congestion, rhinorrhea, sinus pressure, sinus pain, sore throat and trouble swallowing.    Eyes:  Negative for photophobia, pain, discharge and visual disturbance.   Respiratory:  Negative for cough, chest tightness, wheezing and stridor.    Cardiovascular:  Negative for chest pain, palpitations and leg swelling.   Gastrointestinal:  Negative for abdominal pain, blood in stool, constipation, diarrhea and nausea.   Endocrine: Negative for polydipsia, polyphagia and polyuria.   Genitourinary:  Negative for difficulty urinating, flank pain and hematuria.   Musculoskeletal:  Positive for arthralgias. Negative for neck pain.   Skin:  Negative for rash.   Allergic/Immunologic: Negative for food allergies.   Neurological:  Positive for weakness (global weakness) and numbness. Negative for dizziness, tremors, seizures, syncope and headaches.   Psychiatric/Behavioral:  Negative for behavioral problems, confusion, decreased concentration, dysphoric mood and hallucinations. The patient is not nervous/anxious.       Medical / Social / Family History     Past Medical History:   Diagnosis Date    CIDP (chronic inflammatory demyelinating polyneuropathy) 3/3/2022    Foot drop     Hypertension     Osteoarthritis        Past Surgical History:   Procedure Laterality Date    ENDOMETRIAL BIOPSY         Social History  Ms.  reports that she has never smoked. She has never been exposed to tobacco smoke. She has never used smokeless tobacco. She reports current alcohol use. She reports that she does not use drugs.    Family History  Ms.'s family history includes Diabetes in her mother; Hypertension in her father and mother; Leukemia in her father.    Medications and Allergies     Medications  No outpatient medications have been marked as taking for the 6/20/23  encounter (Office Visit) with Antonio Wilson MD.       Allergies  Review of patient's allergies indicates:  No Known Allergies    Physical Examination     Vitals:    06/20/23 1532   BP: 110/80   Pulse: 103   Resp: 16   Temp: 98.2 °F (36.8 °C)     Physical Exam  Constitutional:       General: She is not in acute distress.     Appearance: Normal appearance.   HENT:      Head: Normocephalic.      Right Ear: Tympanic membrane and ear canal normal.      Left Ear: Tympanic membrane and ear canal normal.      Nose: Nose normal.      Mouth/Throat:      Mouth: Mucous membranes are moist.      Pharynx: No oropharyngeal exudate.   Eyes:      Extraocular Movements: Extraocular movements intact.      Pupils: Pupils are equal, round, and reactive to light.   Cardiovascular:      Rate and Rhythm: Normal rate and regular rhythm.      Heart sounds: No murmur heard.  Pulmonary:      Effort: Pulmonary effort is normal.      Breath sounds: Normal breath sounds. No wheezing.   Abdominal:      General: Abdomen is flat. Bowel sounds are normal.      Palpations: Abdomen is soft.      Hernia: No hernia is present.   Musculoskeletal:         General: Normal range of motion.      Cervical back: Normal range of motion and neck supple.      Right lower leg: No edema.      Left lower leg: No edema.   Lymphadenopathy:      Cervical: No cervical adenopathy.   Skin:     General: Skin is warm and dry.      Coloration: Skin is not jaundiced.      Findings: No lesion.   Neurological:      General: No focal deficit present.      Mental Status: She is alert and oriented to person, place, and time.      Cranial Nerves: No cranial nerve deficit.      Gait: Gait normal.   Psychiatric:         Mood and Affect: Mood normal.         Behavior: Behavior normal.         Judgment: Judgment normal.        Assessment and Plan (including Health Maintenance)      Problem List  Smart Sets  Document Outside HM   :    Plan:           Health Maintenance Due   Topic  Date Due    Lipid Panel  Never done    COVID-19 Vaccine (1) Never done    HIV Screening  Never done    TETANUS VACCINE  Never done    Mammogram  Never done    Hemoglobin A1c (Diabetic Prevention Screening)  Never done    Colorectal Cancer Screening  Never done       Problem List Items Addressed This Visit          Neuro    CIDP (chronic inflammatory demyelinating polyneuropathy)       Cardiac/Vascular    Hypertension (Chronic)       Orthopedic    Pes planovalgus     Other Visit Diagnoses       Anemia, unspecified type    -  Primary    Relevant Orders    CBC Auto Differential (Completed)    Vitamin B12 (Completed)    Ferritin (Completed)    Reticulocytes (Completed)    Iron and TIBC (Completed)            Health Maintenance Topics with due status: Not Due       Topic Last Completion Date    Influenza Vaccine 01/22/2020    Cervical Cancer Screening 06/02/2022       Future Appointments   Date Time Provider Department Center   7/19/2023 11:10 AM Antonio Wilson MD [facility]        There are no Patient Instructions on file for this visit.  Follow up if symptoms worsen or fail to improve.     Signature:  Antonio Wilson MD      Date of encounter: 6/20/23

## 2023-06-21 DIAGNOSIS — D64.9 ANEMIA, UNSPECIFIED TYPE: Primary | ICD-10-CM

## 2023-07-19 ENCOUNTER — OFFICE VISIT (OUTPATIENT)
Dept: FAMILY MEDICINE | Facility: CLINIC | Age: 48
End: 2023-07-19
Payer: OTHER GOVERNMENT

## 2023-07-19 VITALS
HEIGHT: 66 IN | TEMPERATURE: 98 F | RESPIRATION RATE: 16 BRPM | SYSTOLIC BLOOD PRESSURE: 110 MMHG | BODY MASS INDEX: 26.84 KG/M2 | OXYGEN SATURATION: 97 % | WEIGHT: 167 LBS | HEART RATE: 96 BPM | DIASTOLIC BLOOD PRESSURE: 78 MMHG

## 2023-07-19 DIAGNOSIS — I10 PRIMARY HYPERTENSION: ICD-10-CM

## 2023-07-19 DIAGNOSIS — D64.9 ANEMIA, UNSPECIFIED TYPE: Primary | ICD-10-CM

## 2023-07-19 LAB
ANION GAP SERPL CALCULATED.3IONS-SCNC: 11 MMOL/L (ref 7–16)
BASOPHILS # BLD AUTO: 0.02 K/UL (ref 0–0.2)
BASOPHILS NFR BLD AUTO: 0.3 % (ref 0–1)
BUN SERPL-MCNC: 20 MG/DL (ref 7–18)
BUN/CREAT SERPL: 18 (ref 6–20)
CALCIUM SERPL-MCNC: 8.7 MG/DL (ref 8.5–10.1)
CHLORIDE SERPL-SCNC: 112 MMOL/L (ref 98–107)
CO2 SERPL-SCNC: 23 MMOL/L (ref 21–32)
CREAT SERPL-MCNC: 1.1 MG/DL (ref 0.55–1.02)
DIFFERENTIAL METHOD BLD: ABNORMAL
EGFR (NO RACE VARIABLE) (RUSH/TITUS): 62 ML/MIN/1.73M2
EOSINOPHIL # BLD AUTO: 0.11 K/UL (ref 0–0.5)
EOSINOPHIL NFR BLD AUTO: 1.7 % (ref 1–4)
ERYTHROCYTE [DISTWIDTH] IN BLOOD BY AUTOMATED COUNT: 19.4 % (ref 11.5–14.5)
GLUCOSE SERPL-MCNC: 81 MG/DL (ref 74–106)
HCT VFR BLD AUTO: 30.7 % (ref 38–47)
HGB BLD-MCNC: 9.2 G/DL (ref 12–16)
IMM GRANULOCYTES # BLD AUTO: 0.02 K/UL (ref 0–0.04)
IMM GRANULOCYTES NFR BLD: 0.3 % (ref 0–0.4)
LYMPHOCYTES # BLD AUTO: 1.59 K/UL (ref 1–4.8)
LYMPHOCYTES NFR BLD AUTO: 24.5 % (ref 27–41)
MCH RBC QN AUTO: 24.8 PG (ref 27–31)
MCHC RBC AUTO-ENTMCNC: 30 G/DL (ref 32–36)
MCV RBC AUTO: 82.7 FL (ref 80–96)
MONOCYTES # BLD AUTO: 0.65 K/UL (ref 0–0.8)
MONOCYTES NFR BLD AUTO: 10 % (ref 2–6)
MPC BLD CALC-MCNC: 8.2 FL (ref 9.4–12.4)
NEUTROPHILS # BLD AUTO: 4.11 K/UL (ref 1.8–7.7)
NEUTROPHILS NFR BLD AUTO: 63.2 % (ref 53–65)
NRBC # BLD AUTO: 0 X10E3/UL
NRBC, AUTO (.00): 0 %
PLATELET # BLD AUTO: 516 K/UL (ref 150–400)
POTASSIUM SERPL-SCNC: 3.9 MMOL/L (ref 3.5–5.1)
RBC # BLD AUTO: 3.71 M/UL (ref 4.2–5.4)
SODIUM SERPL-SCNC: 142 MMOL/L (ref 136–145)
WBC # BLD AUTO: 6.5 K/UL (ref 4.5–11)

## 2023-07-19 PROCEDURE — 99214 OFFICE O/P EST MOD 30 MIN: CPT | Mod: ,,, | Performed by: FAMILY MEDICINE

## 2023-07-19 PROCEDURE — 80048 BASIC METABOLIC PNL TOTAL CA: CPT | Mod: ,,, | Performed by: CLINICAL MEDICAL LABORATORY

## 2023-07-19 PROCEDURE — 85025 CBC WITH DIFFERENTIAL: ICD-10-PCS | Mod: ,,, | Performed by: CLINICAL MEDICAL LABORATORY

## 2023-07-19 PROCEDURE — 85025 COMPLETE CBC W/AUTO DIFF WBC: CPT | Mod: ,,, | Performed by: CLINICAL MEDICAL LABORATORY

## 2023-07-19 PROCEDURE — 99214 PR OFFICE/OUTPT VISIT, EST, LEVL IV, 30-39 MIN: ICD-10-PCS | Mod: ,,, | Performed by: FAMILY MEDICINE

## 2023-07-19 PROCEDURE — 80048 BASIC METABOLIC PANEL: ICD-10-PCS | Mod: ,,, | Performed by: CLINICAL MEDICAL LABORATORY

## 2023-07-22 NOTE — PROGRESS NOTES
Antonio Wilson MD        PATIENT NAME: Laurel Mary  : 1975  DATE: 23  MRN: [id number]      Billing Provider: Antonio Wilson MD  Level of Service: [place] OFFICE/OUTPT VISIT, EST, LEVL IV, 30-39 MIN  Patient PCP Information       Provider PCP Type    Antonio Wilson MD General            Reason for Visit / Chief Complaint: Anemia (One month check.)       Update PCP  Update Chief Complaint         History of Present Illness / Problem Focused Workflow     Laurel Mary presents to the clinic with Anemia (One month check.)     Patient for overall follow-up including anemia.  She is still puzzled about her diagnosis with most concerning possibilities being multiple sclerosis or monoclonal gammopathy of undetermined significance.    Anemia  There has been no abdominal pain, confusion, fever or palpitations.     Review of Systems     Review of Systems   Constitutional:  Positive for fatigue. Negative for activity change, appetite change, fever and unexpected weight change.   HENT:  Negative for congestion, rhinorrhea, sinus pressure, sinus pain, sore throat and trouble swallowing.    Eyes:  Negative for photophobia, pain, discharge and visual disturbance.   Respiratory:  Negative for cough, chest tightness, wheezing and stridor.    Cardiovascular:  Negative for chest pain, palpitations and leg swelling.   Gastrointestinal:  Negative for abdominal pain, blood in stool, constipation, diarrhea and nausea.   Endocrine: Negative for polydipsia, polyphagia and polyuria.   Genitourinary:  Negative for difficulty urinating, flank pain and hematuria.   Musculoskeletal:  Positive for arthralgias and back pain. Negative for neck pain.   Skin:  Negative for rash.   Allergic/Immunologic: Negative for food allergies.   Neurological:  Negative for dizziness, tremors, seizures, syncope, weakness (global weakness) and headaches.   Psychiatric/Behavioral:  Negative for behavioral problems, confusion, decreased concentration,  dysphoric mood and hallucinations. The patient is not nervous/anxious.       Medical / Social / Family History     Past Medical History:   Diagnosis Date    CIDP (chronic inflammatory demyelinating polyneuropathy) 3/3/2022    Foot drop     Hypertension     Osteoarthritis        Past Surgical History:   Procedure Laterality Date    ENDOMETRIAL BIOPSY         Social History  Ms.  reports that she has never smoked. She has never been exposed to tobacco smoke. She has never used smokeless tobacco. She reports current alcohol use. She reports that she does not use drugs.    Family History  Ms.'s family history includes Diabetes in her mother; Hypertension in her father and mother; Leukemia in her father.    Medications and Allergies     Medications  No outpatient medications have been marked as taking for the 7/19/23 encounter (Office Visit) with Antonio Wilson MD.       Allergies  Review of patient's allergies indicates:  No Known Allergies    Physical Examination     Vitals:    07/19/23 1546   BP: 110/78   Pulse: 96   Resp: 16   Temp: 98.1 °F (36.7 °C)     Physical Exam  Constitutional:       General: She is not in acute distress.     Appearance: Normal appearance.   HENT:      Head: Normocephalic.      Right Ear: Tympanic membrane and ear canal normal.      Left Ear: Tympanic membrane and ear canal normal.      Nose: Nose normal.      Mouth/Throat:      Mouth: Mucous membranes are moist.      Pharynx: No oropharyngeal exudate.   Eyes:      Extraocular Movements: Extraocular movements intact.      Pupils: Pupils are equal, round, and reactive to light.   Cardiovascular:      Rate and Rhythm: Normal rate and regular rhythm.      Heart sounds: No murmur heard.  Pulmonary:      Effort: Pulmonary effort is normal.      Breath sounds: Normal breath sounds. No wheezing.   Abdominal:      General: Abdomen is flat. Bowel sounds are normal.      Palpations: Abdomen is soft.      Hernia: No hernia is present.    Musculoskeletal:         General: Normal range of motion.      Cervical back: Normal range of motion and neck supple.      Right lower leg: No edema.      Left lower leg: No edema.   Lymphadenopathy:      Cervical: No cervical adenopathy.   Skin:     General: Skin is warm and dry.      Coloration: Skin is not jaundiced.      Findings: No lesion.   Neurological:      General: No focal deficit present.      Mental Status: She is alert and oriented to person, place, and time.      Cranial Nerves: No cranial nerve deficit.      Gait: Gait normal.   Psychiatric:         Mood and Affect: Mood normal.         Behavior: Behavior normal.         Judgment: Judgment normal.        Assessment and Plan (including Health Maintenance)      Problem List  [employer]  Document Outside HM   :    Plan:     1. Anemia, unspecified type  The current medical regimen is effective;  continue present plan and medications.  -     CBC Auto Differential; Future; Expected date: 01/19/2024    2. Primary hypertension  The current medical regimen is effective;  continue present plan and medications.  -     Basic Metabolic Panel; Future; Expected date: 07/19/2023          Health Maintenance Due   Topic Date Due    Lipid Panel  Never done    COVID-19 Vaccine (1) Never done    HIV Screening  Never done    TETANUS VACCINE  Never done    Mammogram  Never done    Hemoglobin A1c (Diabetic Prevention Screening)  Never done    Colorectal Cancer Screening  Never done       Problem List Items Addressed This Visit          Cardiac/Vascular    Hypertension (Chronic)    Relevant Orders    Basic Metabolic Panel (Completed)     Other Visit Diagnoses       Anemia, unspecified type    -  Primary    Relevant Orders    CBC Auto Differential (Completed)            Health Maintenance Topics with due status: Not Due       Topic Last Completion Date    Influenza Vaccine 01/22/2020    Cervical Cancer Screening 06/02/2022       Future Appointments   Date Time Provider  Department Center   8/16/2023  2:30 PM Antonio Wilson MD [facility]        There are no Patient Instructions on file for this visit.  Follow up in about 3 months (around 10/19/2023) for routine followup.     Signature:  Antonio Wilson MD      Date of encounter: 7/19/23

## 2023-08-29 ENCOUNTER — OFFICE VISIT (OUTPATIENT)
Dept: FAMILY MEDICINE | Facility: CLINIC | Age: 48
End: 2023-08-29
Payer: OTHER GOVERNMENT

## 2023-08-29 VITALS
DIASTOLIC BLOOD PRESSURE: 100 MMHG | HEART RATE: 106 BPM | RESPIRATION RATE: 16 BRPM | SYSTOLIC BLOOD PRESSURE: 160 MMHG | TEMPERATURE: 98 F | BODY MASS INDEX: 26.84 KG/M2 | HEIGHT: 66 IN | OXYGEN SATURATION: 96 % | WEIGHT: 167 LBS

## 2023-08-29 DIAGNOSIS — Q66.6 PES PLANOVALGUS: ICD-10-CM

## 2023-08-29 DIAGNOSIS — D64.9 ANEMIA, UNSPECIFIED TYPE: Primary | ICD-10-CM

## 2023-08-29 DIAGNOSIS — G61.81 CIDP (CHRONIC INFLAMMATORY DEMYELINATING POLYNEUROPATHY): ICD-10-CM

## 2023-08-29 DIAGNOSIS — I10 PRIMARY HYPERTENSION: Chronic | ICD-10-CM

## 2023-08-29 LAB
BASOPHILS # BLD AUTO: 0.02 K/UL (ref 0–0.2)
BASOPHILS NFR BLD AUTO: 0.4 % (ref 0–1)
DIFFERENTIAL METHOD BLD: ABNORMAL
EOSINOPHIL # BLD AUTO: 0.01 K/UL (ref 0–0.5)
EOSINOPHIL NFR BLD AUTO: 0.2 % (ref 1–4)
ERYTHROCYTE [DISTWIDTH] IN BLOOD BY AUTOMATED COUNT: 18.7 % (ref 11.5–14.5)
HCT VFR BLD AUTO: 30.5 % (ref 38–47)
HGB BLD-MCNC: 9.6 G/DL (ref 12–16)
IMM GRANULOCYTES # BLD AUTO: 0.06 K/UL (ref 0–0.04)
IMM GRANULOCYTES NFR BLD: 1.1 % (ref 0–0.4)
LYMPHOCYTES # BLD AUTO: 1.58 K/UL (ref 1–4.8)
LYMPHOCYTES NFR BLD AUTO: 30.3 % (ref 27–41)
MCH RBC QN AUTO: 25.1 PG (ref 27–31)
MCHC RBC AUTO-ENTMCNC: 31.5 G/DL (ref 32–36)
MCV RBC AUTO: 79.6 FL (ref 80–96)
MONOCYTES # BLD AUTO: 0.67 K/UL (ref 0–0.8)
MONOCYTES NFR BLD AUTO: 12.8 % (ref 2–6)
MPC BLD CALC-MCNC: 8.4 FL (ref 9.4–12.4)
NEUTROPHILS # BLD AUTO: 2.88 K/UL (ref 1.8–7.7)
NEUTROPHILS NFR BLD AUTO: 55.2 % (ref 53–65)
NRBC # BLD AUTO: 0 X10E3/UL
NRBC, AUTO (.00): 0 %
PLATELET # BLD AUTO: 501 K/UL (ref 150–400)
RBC # BLD AUTO: 3.83 M/UL (ref 4.2–5.4)
WBC # BLD AUTO: 5.22 K/UL (ref 4.5–11)

## 2023-08-29 PROCEDURE — 85025 CBC WITH DIFFERENTIAL: ICD-10-PCS | Mod: ,,, | Performed by: CLINICAL MEDICAL LABORATORY

## 2023-08-29 PROCEDURE — 99213 PR OFFICE/OUTPT VISIT, EST, LEVL III, 20-29 MIN: ICD-10-PCS | Mod: ,,, | Performed by: FAMILY MEDICINE

## 2023-08-29 PROCEDURE — 85025 COMPLETE CBC W/AUTO DIFF WBC: CPT | Mod: ,,, | Performed by: CLINICAL MEDICAL LABORATORY

## 2023-08-29 PROCEDURE — 99213 OFFICE O/P EST LOW 20 MIN: CPT | Mod: ,,, | Performed by: FAMILY MEDICINE

## 2023-08-29 NOTE — PROGRESS NOTES
Antonio Wilson MD        PATIENT NAME: Laurel Mary  : 1975  DATE: 23  MRN: [id number]      Billing Provider: Antonio Wilson MD  Level of Service: [place] OFFICE/OUTPT VISIT, EST, LEVL III, 20-29 MIN  Patient PCP Information       Provider PCP Type    Antonio Wilson MD General            Reason for Visit / Chief Complaint: Foot Pain (Pain with walking )       Update PCP  Update Chief Complaint         History of Present Illness / Problem Focused Workflow     Laurel Mary presents to the clinic with Foot Pain (Pain with walking )     Will see Dr. Abreu .  Getting weaker in feet and legs.  Is to have hys for fibroids.  Takes iron off and on.  Staes she has chronic bornchitis.  Lungs CTA    Foot Pain  Associated symptoms include arthralgias. Pertinent negatives include no abdominal pain, chest pain, congestion, coughing, fever, headaches, nausea, neck pain, rash, sore throat or weakness (global weakness).       Review of Systems     Review of Systems   Constitutional:  Negative for activity change, appetite change, fever and unexpected weight change.   HENT:  Negative for congestion, rhinorrhea, sinus pressure, sinus pain, sore throat and trouble swallowing.    Eyes:  Negative for photophobia, pain, discharge and visual disturbance.   Respiratory:  Negative for cough, chest tightness, wheezing and stridor.    Cardiovascular:  Negative for chest pain, palpitations and leg swelling.   Gastrointestinal:  Negative for abdominal pain, blood in stool, constipation, diarrhea and nausea.   Endocrine: Negative for polydipsia, polyphagia and polyuria.   Genitourinary:  Negative for difficulty urinating, flank pain and hematuria.   Musculoskeletal:  Positive for arthralgias and gait problem. Negative for neck pain.   Skin:  Negative for rash.   Allergic/Immunologic: Negative for food allergies.   Neurological:  Negative for dizziness, tremors, seizures, syncope, weakness (global weakness) and headaches.    Psychiatric/Behavioral:  Negative for behavioral problems, confusion, decreased concentration, dysphoric mood and hallucinations. The patient is not nervous/anxious.         Medical / Social / Family History     Past Medical History:   Diagnosis Date    CIDP (chronic inflammatory demyelinating polyneuropathy) 3/3/2022    Foot drop     Hypertension     Osteoarthritis        Past Surgical History:   Procedure Laterality Date    ENDOMETRIAL BIOPSY         Social History  Ms.  reports that she has never smoked. She has never been exposed to tobacco smoke. She has never used smokeless tobacco. She reports current alcohol use. She reports that she does not use drugs.    Family History  Ms.'s family history includes Diabetes in her mother; Hypertension in her father and mother; Leukemia in her father.    Medications and Allergies     Medications  No outpatient medications have been marked as taking for the 8/29/23 encounter (Office Visit) with Antonio Wilson MD.       Allergies  Review of patient's allergies indicates:  No Known Allergies    Physical Examination     Vitals:    08/29/23 1454   BP: (!) 160/100   Pulse: 106   Resp: 16   Temp: 98 °F (36.7 °C)     Physical Exam  Constitutional:       General: She is not in acute distress.     Appearance: Normal appearance.   HENT:      Head: Normocephalic.      Right Ear: Tympanic membrane and ear canal normal.      Left Ear: Tympanic membrane and ear canal normal.      Nose: Nose normal.      Mouth/Throat:      Mouth: Mucous membranes are moist.      Pharynx: No oropharyngeal exudate.   Eyes:      Extraocular Movements: Extraocular movements intact.      Pupils: Pupils are equal, round, and reactive to light.   Cardiovascular:      Rate and Rhythm: Normal rate and regular rhythm.      Heart sounds: No murmur heard.  Pulmonary:      Effort: Pulmonary effort is normal.      Breath sounds: Normal breath sounds. No wheezing.   Abdominal:      General: Abdomen is flat. Bowel  sounds are normal.      Palpations: Abdomen is soft.      Hernia: No hernia is present.   Musculoskeletal:         General: Normal range of motion.      Cervical back: Normal range of motion and neck supple.      Right lower leg: No edema.      Left lower leg: No edema.   Lymphadenopathy:      Cervical: No cervical adenopathy.   Skin:     General: Skin is warm and dry.      Coloration: Skin is not jaundiced.      Findings: No lesion.   Neurological:      General: No focal deficit present.      Mental Status: She is alert and oriented to person, place, and time.      Cranial Nerves: No cranial nerve deficit.      Gait: Gait abnormal.      Comments: Hallux valgus bilateral   Psychiatric:         Mood and Affect: Mood normal.         Behavior: Behavior normal.         Judgment: Judgment normal.          Assessment and Plan (including Health Maintenance)      Problem List  [employer]  Document Outside HM   :    Plan:           Health Maintenance Due   Topic Date Due    Lipid Panel  Never done    HIV Screening  Never done    TETANUS VACCINE  Never done    Mammogram  Never done    Hemoglobin A1c (Diabetic Prevention Screening)  Never done    Colorectal Cancer Screening  Never done    COVID-19 Vaccine (3 - Moderna series) 10/18/2021       Problem List Items Addressed This Visit          Neuro    CIDP (chronic inflammatory demyelinating polyneuropathy)       Cardiac/Vascular    Hypertension (Chronic)       Orthopedic    Pes planovalgus     Other Visit Diagnoses       Anemia, unspecified type    -  Primary    Relevant Orders    CBC Auto Differential (Completed)            Health Maintenance Topics with due status: Not Due       Topic Last Completion Date    Influenza Vaccine 01/22/2020    Cervical Cancer Screening 06/02/2022       Future Appointments   Date Time Provider Department Center   10/2/2023  2:00 PM Antonio Wilson MD [facility]        There are no Patient Instructions on file for this  visit.  Follow up if symptoms worsen or fail to improve.     Signature:  Antonio Wilson MD      Date of encounter: 8/29/23

## 2024-09-19 ENCOUNTER — OFFICE VISIT (OUTPATIENT)
Dept: FAMILY MEDICINE | Facility: CLINIC | Age: 49
End: 2024-09-19
Payer: OTHER GOVERNMENT

## 2024-09-19 VITALS
RESPIRATION RATE: 18 BRPM | SYSTOLIC BLOOD PRESSURE: 149 MMHG | WEIGHT: 170 LBS | DIASTOLIC BLOOD PRESSURE: 96 MMHG | OXYGEN SATURATION: 95 % | BODY MASS INDEX: 27.32 KG/M2 | TEMPERATURE: 97 F | HEART RATE: 104 BPM | HEIGHT: 66 IN

## 2024-09-19 DIAGNOSIS — M19.071 OSTEOARTHRITIS OF BOTH FEET, UNSPECIFIED OSTEOARTHRITIS TYPE: ICD-10-CM

## 2024-09-19 DIAGNOSIS — Z86.2 HISTORY OF ANEMIA: Primary | ICD-10-CM

## 2024-09-19 DIAGNOSIS — M17.0 OSTEOARTHRITIS OF BOTH KNEES, UNSPECIFIED OSTEOARTHRITIS TYPE: ICD-10-CM

## 2024-09-19 DIAGNOSIS — M25.572 BILATERAL ANKLE PAIN, UNSPECIFIED CHRONICITY: ICD-10-CM

## 2024-09-19 DIAGNOSIS — M25.571 BILATERAL ANKLE PAIN, UNSPECIFIED CHRONICITY: ICD-10-CM

## 2024-09-19 DIAGNOSIS — M25.562 PAIN IN BOTH KNEES, UNSPECIFIED CHRONICITY: ICD-10-CM

## 2024-09-19 DIAGNOSIS — M25.561 PAIN IN BOTH KNEES, UNSPECIFIED CHRONICITY: ICD-10-CM

## 2024-09-19 DIAGNOSIS — M19.072 OSTEOARTHRITIS OF BOTH FEET, UNSPECIFIED OSTEOARTHRITIS TYPE: ICD-10-CM

## 2024-09-19 DIAGNOSIS — D21.9 FIBROIDS: ICD-10-CM

## 2024-09-19 LAB
ANION GAP SERPL CALCULATED.3IONS-SCNC: 13 MMOL/L (ref 7–16)
BASOPHILS # BLD AUTO: 0.02 K/UL (ref 0–0.2)
BASOPHILS NFR BLD AUTO: 0.4 % (ref 0–1)
BUN SERPL-MCNC: 21 MG/DL (ref 7–18)
BUN/CREAT SERPL: 19 (ref 6–20)
CALCIUM SERPL-MCNC: 9.3 MG/DL (ref 8.5–10.1)
CHLORIDE SERPL-SCNC: 106 MMOL/L (ref 98–107)
CHOLEST SERPL-MCNC: 256 MG/DL (ref 0–200)
CHOLEST/HDLC SERPL: 3.8 {RATIO}
CO2 SERPL-SCNC: 23 MMOL/L (ref 21–32)
CREAT SERPL-MCNC: 1.1 MG/DL (ref 0.55–1.02)
DIFFERENTIAL METHOD BLD: ABNORMAL
EGFR (NO RACE VARIABLE) (RUSH/TITUS): 62 ML/MIN/1.73M2
EOSINOPHIL # BLD AUTO: 0.17 K/UL (ref 0–0.5)
EOSINOPHIL NFR BLD AUTO: 3.2 % (ref 1–4)
ERYTHROCYTE [DISTWIDTH] IN BLOOD BY AUTOMATED COUNT: 15.6 % (ref 11.5–14.5)
EST. AVERAGE GLUCOSE BLD GHB EST-MCNC: 97 MG/DL
FERRITIN SERPL-MCNC: 69 NG/ML (ref 8–252)
GLUCOSE SERPL-MCNC: 99 MG/DL (ref 74–106)
HBA1C MFR BLD HPLC: 5 % (ref 4.5–6.6)
HCT VFR BLD AUTO: 41.9 % (ref 38–47)
HDLC SERPL-MCNC: 67 MG/DL (ref 40–60)
HGB BLD-MCNC: 13.9 G/DL (ref 12–16)
IMM GRANULOCYTES # BLD AUTO: 0.01 K/UL (ref 0–0.04)
IMM GRANULOCYTES NFR BLD: 0.2 % (ref 0–0.4)
IRON SATN MFR SERPL: 22 % (ref 14–50)
IRON SERPL-MCNC: 74 ΜG/DL (ref 50–170)
LDLC SERPL CALC-MCNC: 170 MG/DL
LDLC/HDLC SERPL: 2.5 {RATIO}
LYMPHOCYTES # BLD AUTO: 1.4 K/UL (ref 1–4.8)
LYMPHOCYTES NFR BLD AUTO: 26.7 % (ref 27–41)
MCH RBC QN AUTO: 32.9 PG (ref 27–31)
MCHC RBC AUTO-ENTMCNC: 33.2 G/DL (ref 32–36)
MCV RBC AUTO: 99.3 FL (ref 80–96)
MONOCYTES # BLD AUTO: 0.65 K/UL (ref 0–0.8)
MONOCYTES NFR BLD AUTO: 12.4 % (ref 2–6)
MPC BLD CALC-MCNC: 9.8 FL (ref 9.4–12.4)
NEUTROPHILS # BLD AUTO: 3 K/UL (ref 1.8–7.7)
NEUTROPHILS NFR BLD AUTO: 57.1 % (ref 53–65)
NONHDLC SERPL-MCNC: 189 MG/DL
NRBC # BLD AUTO: 0 X10E3/UL
NRBC, AUTO (.00): 0 %
PLATELET # BLD AUTO: 323 K/UL (ref 150–400)
POTASSIUM SERPL-SCNC: 3.2 MMOL/L (ref 3.5–5.1)
RBC # BLD AUTO: 4.22 M/UL (ref 4.2–5.4)
SODIUM SERPL-SCNC: 139 MMOL/L (ref 136–145)
TIBC SERPL-MCNC: 332 ΜG/DL (ref 250–450)
TRIGL SERPL-MCNC: 94 MG/DL (ref 35–150)
VLDLC SERPL-MCNC: 19 MG/DL
WBC # BLD AUTO: 5.25 K/UL (ref 4.5–11)

## 2024-09-19 PROCEDURE — 83550 IRON BINDING TEST: CPT | Mod: ,,, | Performed by: CLINICAL MEDICAL LABORATORY

## 2024-09-19 PROCEDURE — 82728 ASSAY OF FERRITIN: CPT | Mod: ,,, | Performed by: CLINICAL MEDICAL LABORATORY

## 2024-09-19 PROCEDURE — 83036 HEMOGLOBIN GLYCOSYLATED A1C: CPT | Mod: ,,, | Performed by: CLINICAL MEDICAL LABORATORY

## 2024-09-19 PROCEDURE — 99213 OFFICE O/P EST LOW 20 MIN: CPT | Mod: ,,, | Performed by: INTERNAL MEDICINE

## 2024-09-19 PROCEDURE — 80061 LIPID PANEL: CPT | Mod: ,,, | Performed by: CLINICAL MEDICAL LABORATORY

## 2024-09-19 PROCEDURE — 80048 BASIC METABOLIC PNL TOTAL CA: CPT | Mod: ,,, | Performed by: CLINICAL MEDICAL LABORATORY

## 2024-09-19 PROCEDURE — 85025 COMPLETE CBC W/AUTO DIFF WBC: CPT | Mod: ,,, | Performed by: CLINICAL MEDICAL LABORATORY

## 2024-09-19 PROCEDURE — 83540 ASSAY OF IRON: CPT | Mod: ,,, | Performed by: CLINICAL MEDICAL LABORATORY

## 2024-09-19 RX ORDER — FOLIC ACID 1 MG/1
1 TABLET ORAL
COMMUNITY
Start: 2024-05-13

## 2024-09-19 RX ORDER — ERGOCALCIFEROL 1.25 MG/1
1250 CAPSULE ORAL
COMMUNITY
Start: 2024-05-13

## 2024-10-09 ENCOUNTER — OFFICE VISIT (OUTPATIENT)
Dept: FAMILY MEDICINE | Facility: CLINIC | Age: 49
End: 2024-10-09

## 2024-10-09 VITALS
WEIGHT: 166 LBS | BODY MASS INDEX: 26.68 KG/M2 | OXYGEN SATURATION: 97 % | TEMPERATURE: 98 F | SYSTOLIC BLOOD PRESSURE: 149 MMHG | HEIGHT: 66 IN | RESPIRATION RATE: 18 BRPM | DIASTOLIC BLOOD PRESSURE: 95 MMHG | HEART RATE: 106 BPM

## 2024-10-09 DIAGNOSIS — S89.90XA TRAUMATIC INJURY OF KNEE: Primary | ICD-10-CM

## 2024-10-09 PROCEDURE — 99214 OFFICE O/P EST MOD 30 MIN: CPT | Mod: ,,, | Performed by: INTERNAL MEDICINE

## 2024-10-10 RX ORDER — PREDNISONE 2.5 MG/1
2.5 TABLET ORAL DAILY
Qty: 15 TABLET | Refills: 6 | Status: SHIPPED | OUTPATIENT
Start: 2024-10-10

## 2024-10-10 RX ORDER — MUPIROCIN 20 MG/G
OINTMENT TOPICAL 2 TIMES DAILY
Qty: 1 G | Refills: 0 | Status: SHIPPED | OUTPATIENT
Start: 2024-10-10

## 2024-10-14 ENCOUNTER — OFFICE VISIT (OUTPATIENT)
Dept: ORTHOPEDICS | Facility: CLINIC | Age: 49
End: 2024-10-14

## 2024-10-14 DIAGNOSIS — M17.0 OSTEOARTHRITIS OF BOTH KNEES, UNSPECIFIED OSTEOARTHRITIS TYPE: ICD-10-CM

## 2024-10-14 PROCEDURE — 99999 PR PBB SHADOW E&M-EST. PATIENT-LVL III: CPT | Mod: PBBFAC,,, | Performed by: ORTHOPAEDIC SURGERY

## 2024-10-14 PROCEDURE — 99204 OFFICE O/P NEW MOD 45 MIN: CPT | Mod: ,,, | Performed by: ORTHOPAEDIC SURGERY

## 2024-10-15 NOTE — PROGRESS NOTES
Subjective:       Patient ID: Laurel Mary is a 48 y.o. female.    Chief Complaint: Neurologic Problem, Health Maintenance, Leg Pain, and Oral Pain (Tongue pain )    HPI  .  This patient presents with a history of anemia, chronic hypertension patient requesting anemia workup.  Patient complains of moderate pain in the right foot also pain in the right knee.  Patient has tenderness in his right knee and also the right ankle.  Patient also has a history of polyneuropathy.  No fever chills chest pain or shortness for breath patient does give a history of uterine fibroids.    Current Medications:    Current Outpatient Medications:     amLODIPine (NORVASC) 5 MG tablet, Take 1 tablet (5 mg total) by mouth once daily., Disp: 30 tablet, Rfl: 0    aspirin (ECOTRIN) 81 MG EC tablet, Take 1 tablet (81 mg total) by mouth once daily., Disp: 90 tablet, Rfl: 1    aspirin/salicylamide/caffeine (BC HEADACHE POWDER ORAL), Take by mouth., Disp: , Rfl:     ergocalciferol (ERGOCALCIFEROL) 50,000 unit Cap, Take 1,250 mcg by mouth., Disp: , Rfl:     ferrous sulfate 325 (65 FE) MG EC tablet, Take 1 tablet (325 mg total) by mouth once daily., Disp: 90 tablet, Rfl: 3    folic acid (FOLVITE) 1 MG tablet, Take 1 mg by mouth., Disp: , Rfl:     ibuprofen (ADVIL,MOTRIN) 800 MG tablet, Take 1 tablet (800 mg total) by mouth every 6 (six) hours as needed for Pain., Disp: 20 tablet, Rfl: 0    losartan (COZAAR) 100 MG tablet, Take 1 tablet (100 mg total) by mouth once daily., Disp: 90 tablet, Rfl: 3    potassium chloride SA (K-DUR,KLOR-CON) 20 MEQ tablet, 20 mEq., Disp: , Rfl:     mupirocin (BACTROBAN) 2 % ointment, Apply topically 2 (two) times daily. Apply to left knee, Disp: 1 g, Rfl: 0    predniSONE (DELTASONE) 2.5 MG tablet, Take 1 tablet (2.5 mg total) by mouth once daily., Disp: 15 tablet, Rfl: 6           ROS  Twelve point system reviewed, unremarkable except for stated above in HPI.        Objective:         Vitals:    09/19/24 1518  "09/19/24 1519   BP: (!) 142/102 (!) 149/96   BP Location: Left arm    Patient Position: Sitting    BP Method: Large (Automatic)    Pulse: 104    Resp: 18    Temp: 97.2 °F (36.2 °C)    TempSrc: Temporal    SpO2: 95%    Weight: 77.1 kg (170 lb)    Height: 5' 6" (1.676 m)         Physical Exam     Patient is awake alert oriented person place and  Lungs are clear to auscultation bilaterally no crackles or wheezes   Cardiovascular S1-S2 regular rate and rhythm no murmurs rubs or gallops   Abdomen is soft positive bowel sounds nontender, extremities no clubbing cyanosis edema  Neuro no focal neurological deficits  Skin warm and dry.     Last Labs:     Office Visit on 09/19/2024   Component Date Value    Sodium 09/19/2024 139     Potassium 09/19/2024 3.2 (L)     Chloride 09/19/2024 106     CO2 09/19/2024 23     Anion Gap 09/19/2024 13     Glucose 09/19/2024 99     BUN 09/19/2024 21 (H)     Creatinine 09/19/2024 1.10 (H)     BUN/Creatinine Ratio 09/19/2024 19     Calcium 09/19/2024 9.3     eGFR 09/19/2024 62     Iron 09/19/2024 74     Iron Saturation 09/19/2024 22     TIBC 09/19/2024 332     Ferritin 09/19/2024 69     Hemoglobin A1C 09/19/2024 5.0     Estimated Average Glucose 09/19/2024 97     Triglycerides 09/19/2024 94     Cholesterol 09/19/2024 256 (H)     HDL Cholesterol 09/19/2024 67 (H)     Cholesterol/HDL Ratio (R* 09/19/2024 3.8     Non-HDL 09/19/2024 189     LDL Calculated 09/19/2024 170     LDL/HDL 09/19/2024 2.5     VLDL 09/19/2024 19     WBC 09/19/2024 5.25     RBC 09/19/2024 4.22     Hemoglobin 09/19/2024 13.9     Hematocrit 09/19/2024 41.9     MCV 09/19/2024 99.3 (H)     MCH 09/19/2024 32.9 (H)     MCHC 09/19/2024 33.2     RDW 09/19/2024 15.6 (H)     Platelet Count 09/19/2024 323     MPV 09/19/2024 9.8     Neutrophils % 09/19/2024 57.1     Lymphocytes % 09/19/2024 26.7 (L)     Monocytes % 09/19/2024 12.4 (H)     Eosinophils % 09/19/2024 3.2     Basophils % 09/19/2024 0.4     Immature Granulocytes % 09/19/2024 " 0.2     nRBC, Auto 09/19/2024 0.0     Neutrophils, Abs 09/19/2024 3.00     Lymphocytes, Absolute 09/19/2024 1.40     Monocytes, Absolute 09/19/2024 0.65     Eosinophils, Absolute 09/19/2024 0.17     Basophils, Absolute 09/19/2024 0.02     Immature Granulocytes, A* 09/19/2024 0.01     nRBC, Absolute 09/19/2024 0.00     Diff Type 09/19/2024 Auto        Last Imaging:  X-Ray Knee 1 or 2 View Left  Narrative: EXAMINATION:  XR KNEE 1 OR 2 VIEW LEFT    CLINICAL HISTORY:  Unspecified injury of unspecified lower leg, initial encounter    TECHNIQUE:  AP and lateral left knee    COMPARISON:  None    FINDINGS:  No fracture, dislocation, or joint effusion.  No significant degenerative changes.  Impression: No significant pathology.    Electronically signed by: Cody Cruz MD  Date:    10/11/2024  Time:    16:12         **Labs and x-rays personally reviewed by me    ** reviewed           Assessment & Plan:       1. History of anemia  -     Basic Metabolic Panel; Future; Expected date: 09/19/2024  -     CBC Auto Differential; Future; Expected date: 09/19/2024  -     Iron and TIBC; Future; Expected date: 09/19/2024  -     Ferritin; Future; Expected date: 09/19/2024  -     Hemoglobin A1C; Future; Expected date: 09/19/2024  -     Lipid Panel; Future; Expected date: 09/19/2024    2. Pain in both knees, unspecified chronicity    3. Bilateral ankle pain, unspecified chronicity    4. Osteoarthritis of both feet, unspecified osteoarthritis type  -     X-Ray Ankle 2 View Bilateral; Future; Expected date: 09/19/2024  -     Ambulatory referral/consult to Podiatry; Future; Expected date: 09/26/2024    5. Osteoarthritis of both knees, unspecified osteoarthritis type  -     X-Ray Knee 1 or 2 View Bilateral; Future; Expected date: 09/19/2024  -     Ambulatory referral/consult to Orthopedics; Future; Expected date: 09/26/2024  -     Ambulatory referral/consult to Physical/Occupational Therapy; Future; Expected date: 09/26/2024    6.  Fibroids  -     Ambulatory referral/consult to Gynecology; Future; Expected date: 09/26/2024            Noé Glass MD

## 2024-10-15 NOTE — PROGRESS NOTES
CLINIC NOTE       Chief Complaint   Patient presents with    Right Knee - Pain    Left Knee - Pain        Laurel Mary is a 48 y.o. female seen today for  Dr. Noé Glass referral. She was known to me having seen her in 2021 for bilateral pes planovalgus foot deformities (flatfoot).  She was seen she was several foot specialist to include Dr. Hamlin (orthopedic foot and ankle specialist) in [place] as well as podiatrist Dr. Espinal in [place] and Dr. Clifford in [place].  None of recommended any surgical intervention for her foot or ankle.  She has in the past been seen by rheumatologist Dr. James England he was currently not available at [facility].  She was in the process of arranging evaluation with a different rheumatologist as well as a neurologist.  She complains of weakness and gait and coordination.  She was reportedly fell recently sustaining a superficial abrasion to her left knee.  X-rays of both knees failed to reveal any evidence of fracture, dislocation, pathologic bone or significant DJD.  She was a superficial abrasion over the anterior prepatellar region of the left knee.  There is no effusion of either knee.  Knee ligaments are stable clinically.  She was reports having a diagnosis of a demyelinating neurologic disease.  Patient was advised no orthopedic surgical intervention recommended at this time.  If she does not fact have a demyelinating neurologic condition she will need to be seen by a neurologist.  I suggested she may possibly want to see Dr. Hannah Randolph's in [place] for this evaluation.    Past Medical History:   Diagnosis Date    CIDP (chronic inflammatory demyelinating polyneuropathy) 3/3/2022    Foot drop     Hypertension     Osteoarthritis      Family History   Problem Relation Name Age of Onset    Hypertension Mother      Diabetes Mother      Hypertension Father      Leukemia Father       Current Outpatient Medications on File Prior to Visit   Medication Sig Dispense Refill     amLODIPine (NORVASC) 5 MG tablet Take 1 tablet (5 mg total) by mouth once daily. 30 tablet 0    aspirin (ECOTRIN) 81 MG EC tablet Take 1 tablet (81 mg total) by mouth once daily. 90 tablet 1    aspirin/salicylamide/caffeine (BC HEADACHE POWDER ORAL) Take by mouth.      ergocalciferol (ERGOCALCIFEROL) 50,000 unit Cap Take 1,250 mcg by mouth.      ferrous sulfate 325 (65 FE) MG EC tablet Take 1 tablet (325 mg total) by mouth once daily. 90 tablet 3    folic acid (FOLVITE) 1 MG tablet Take 1 mg by mouth.      ibuprofen (ADVIL,MOTRIN) 800 MG tablet Take 1 tablet (800 mg total) by mouth every 6 (six) hours as needed for Pain. 20 tablet 0    losartan (COZAAR) 100 MG tablet Take 1 tablet (100 mg total) by mouth once daily. 90 tablet 3    mupirocin (BACTROBAN) 2 % ointment Apply topically 2 (two) times daily. Apply to left knee 1 g 0    potassium chloride SA (K-DUR,KLOR-CON) 20 MEQ tablet 20 mEq.      predniSONE (DELTASONE) 2.5 MG tablet Take 1 tablet (2.5 mg total) by mouth once daily. 15 tablet 6     No current facility-administered medications on file prior to visit.       ROS     There were no vitals filed for this visit.    Past Surgical History:   Procedure Laterality Date    ENDOMETRIAL BIOPSY          Review of patient's allergies indicates:  No Known Allergies     Ortho Exam     Radiographic Examination:    Technique:    Findings:    Impression:   See Above    Assessment and Plan  Patient Active Problem List    Diagnosis Date Noted    Hypertension     Iron deficiency anemia due to chronic blood loss 01/27/2023    Encephalopathy 06/14/2022    Proximal limb muscle weakness 03/03/2022    Acquired right foot drop 03/03/2022    Ulnar nerve damage, initial encounter 03/03/2022    Wrist drop, left 03/03/2022    CIDP (chronic inflammatory demyelinating polyneuropathy) 03/03/2022    Pes planovalgus 05/26/2021          Vic Harrison M.D.

## 2024-10-18 ENCOUNTER — PATIENT MESSAGE (OUTPATIENT)
Dept: ORTHOPEDICS | Facility: CLINIC | Age: 49
End: 2024-10-18
Payer: OTHER GOVERNMENT

## 2024-10-20 NOTE — PROGRESS NOTES
Subjective:       Patient ID: Laurel Mary is a 48 y.o. female.    Chief Complaint: Health Maintenance (Care gaps refused ), Knee Pain (Left ), and Shoulder Pain    HPI  .  PATIENT COMPLAINS OF MODERATE PAIN TO THE RIGHT KNEE STATED THAT SHE HIT HER KNEE TRAUMATIC INJURY TO THE RIGHT KNEE.  SHE HAS A CHRONIC RHEUMATOLOGICAL DISEASE DIAGNOSED IN THE CHART.  RIGHT KNEE SHOWS A ABRASION WITH MINIMAL CELLULITIS.    WILL GET THE PATIENT'S REFER TO ORTHOPEDICS SHE DOES HAVE MODERATE PAIN IN HER FEET REFER TO PODIATRY   ALSO GOING TO SET HER UP WITH PHYSICAL AND OCCUPATIONAL THERAPY.  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 PREDNISONE.    Current Medications:    Current Outpatient Medications:     amLODIPine (NORVASC) 5 MG tablet, Take 1 tablet (5 mg total) by mouth once daily., Disp: 30 tablet, Rfl: 0    aspirin (ECOTRIN) 81 MG EC tablet, Take 1 tablet (81 mg total) by mouth once daily., Disp: 90 tablet, Rfl: 1    aspirin/salicylamide/caffeine (BC HEADACHE POWDER ORAL), Take by mouth., Disp: , Rfl:     ergocalciferol (ERGOCALCIFEROL) 50,000 unit Cap, Take 1,250 mcg by mouth., Disp: , Rfl:     ferrous sulfate 325 (65 FE) MG EC tablet, Take 1 tablet (325 mg total) by mouth once daily., Disp: 90 tablet, Rfl: 3    folic acid (FOLVITE) 1 MG tablet, Take 1 mg by mouth., Disp: , Rfl:     ibuprofen (ADVIL,MOTRIN) 800 MG tablet, Take 1 tablet (800 mg total) by mouth every 6 (six) hours as needed for Pain., Disp: 20 tablet, Rfl: 0    losartan (COZAAR) 100 MG tablet, Take 1 tablet (100 mg total) by mouth once daily., Disp: 90 tablet, Rfl: 3    potassium chloride SA (K-DUR,KLOR-CON) 20 MEQ tablet, 20 mEq., Disp: , Rfl:     mupirocin (BACTROBAN) 2 % ointment, Apply topically 2 (two) times daily. Apply to left knee, Disp: 1 g, Rfl: 0    predniSONE (DELTASONE) 2.5 MG tablet, Take 1 tablet (2.5 mg total) by mouth once daily., Disp: 15 tablet, Rfl: 6           ROS  Twelve point system reviewed, unremarkable  "except for stated above in HPI.        Objective:         Vitals:    10/09/24 1522 10/09/24 1638   BP: (!) 157/107 (!) 149/95   BP Location: Left arm    Patient Position: Sitting    Pulse: 106    Resp: 18    Temp: 97.9 °F (36.6 °C)    TempSrc: Temporal    SpO2: 97%    Weight: 75.3 kg (166 lb)    Height: 5' 6" (1.676 m)         Physical Exam     Patient is awake alert oriented person place and  Lungs are clear to auscultation bilaterally no crackles or wheezes   Cardiovascular S1-S2 regular rate and rhythm no murmurs rubs or gallops   Abdomen is soft positive bowel sounds nontender, extremities no clubbing cyanosis edema  Neuro no focal neurological deficits  Skin warm and dry.     Last Labs:     Office Visit on 09/19/2024   Component Date Value    Sodium 09/19/2024 139     Potassium 09/19/2024 3.2 (L)     Chloride 09/19/2024 106     CO2 09/19/2024 23     Anion Gap 09/19/2024 13     Glucose 09/19/2024 99     BUN 09/19/2024 21 (H)     Creatinine 09/19/2024 1.10 (H)     BUN/Creatinine Ratio 09/19/2024 19     Calcium 09/19/2024 9.3     eGFR 09/19/2024 62     Iron 09/19/2024 74     Iron Saturation 09/19/2024 22     TIBC 09/19/2024 332     Ferritin 09/19/2024 69     Hemoglobin A1C 09/19/2024 5.0     Estimated Average Glucose 09/19/2024 97     Triglycerides 09/19/2024 94     Cholesterol 09/19/2024 256 (H)     HDL Cholesterol 09/19/2024 67 (H)     Cholesterol/HDL Ratio (R* 09/19/2024 3.8     Non-HDL 09/19/2024 189     LDL Calculated 09/19/2024 170     LDL/HDL 09/19/2024 2.5     VLDL 09/19/2024 19     WBC 09/19/2024 5.25     RBC 09/19/2024 4.22     Hemoglobin 09/19/2024 13.9     Hematocrit 09/19/2024 41.9     MCV 09/19/2024 99.3 (H)     MCH 09/19/2024 32.9 (H)     MCHC 09/19/2024 33.2     RDW 09/19/2024 15.6 (H)     Platelet Count 09/19/2024 323     MPV 09/19/2024 9.8     Neutrophils % 09/19/2024 57.1     Lymphocytes % 09/19/2024 26.7 (L)     Monocytes % 09/19/2024 12.4 (H)     Eosinophils % 09/19/2024 3.2     Basophils % " 09/19/2024 0.4     Immature Granulocytes % 09/19/2024 0.2     nRBC, Auto 09/19/2024 0.0     Neutrophils, Abs 09/19/2024 3.00     Lymphocytes, Absolute 09/19/2024 1.40     Monocytes, Absolute 09/19/2024 0.65     Eosinophils, Absolute 09/19/2024 0.17     Basophils, Absolute 09/19/2024 0.02     Immature Granulocytes, A* 09/19/2024 0.01     nRBC, Absolute 09/19/2024 0.00     Diff Type 09/19/2024 Auto        Last Imaging:  X-Ray Knee 1 or 2 View Left  Narrative: EXAMINATION:  XR KNEE 1 OR 2 VIEW LEFT    CLINICAL HISTORY:  Unspecified injury of unspecified lower leg, initial encounter    TECHNIQUE:  AP and lateral left knee    COMPARISON:  None    FINDINGS:  No fracture, dislocation, or joint effusion.  No significant degenerative changes.  Impression: No significant pathology.    Electronically signed by: Cody Cruz MD  Date:    10/11/2024  Time:    16:12         **Labs and x-rays personally reviewed by me    ** reviewed           Assessment & Plan:       1. Traumatic injury of knee  -     X-Ray Knee 1 or 2 View Left; Future; Expected date: 10/09/2024    CIDPN  -     predniSONE (DELTASONE) 2.5 MG tablet; Take 1 tablet (2.5 mg total) by mouth once daily.  Dispense: 15 tablet; Refill: 6  -     mupirocin (BACTROBAN) 2 % ointment; Apply topically 2 (two) times daily. Apply to left knee  Dispense: 1 g; Refill: 0            Noé Glass MD     operating room operating room

## 2024-10-29 ENCOUNTER — OFFICE VISIT (OUTPATIENT)
Dept: OBSTETRICS AND GYNECOLOGY | Facility: CLINIC | Age: 49
End: 2024-10-29
Payer: OTHER GOVERNMENT

## 2024-10-29 VITALS
SYSTOLIC BLOOD PRESSURE: 150 MMHG | WEIGHT: 164.63 LBS | DIASTOLIC BLOOD PRESSURE: 90 MMHG | HEIGHT: 66 IN | BODY MASS INDEX: 26.46 KG/M2

## 2024-10-29 DIAGNOSIS — D21.9 FIBROIDS: ICD-10-CM

## 2024-10-29 PROCEDURE — 99213 OFFICE O/P EST LOW 20 MIN: CPT | Mod: PBBFAC | Performed by: OBSTETRICS & GYNECOLOGY

## 2024-10-29 PROCEDURE — 99999 PR PBB SHADOW E&M-EST. PATIENT-LVL III: CPT | Mod: PBBFAC,,, | Performed by: OBSTETRICS & GYNECOLOGY

## 2024-10-29 PROCEDURE — 99499 UNLISTED E&M SERVICE: CPT | Mod: S$PBB,,, | Performed by: OBSTETRICS & GYNECOLOGY

## 2024-10-29 NOTE — PROGRESS NOTES
Subjective:       Patient ID: Laurel Mary is a 49 y.o. female.    Chief Complaint: Urinary Tract Infection (Pt is here c/o hx of recurrent uti, she also has inflammation on brain, and having work up for MS. She had hx of group b infection after an  in past. She is skipping cycles also. She was seeing rheumatologist here before she left, also sees psyche doctor. )    Patient was given an new gyn appointment    However I explained to patient that I have not seen new patients in the last 4 months because I am retiring in November.      Patient then explained to me that she had preferred to see Dr. Vilchis gynecologist who she has seen in the past    He has explained to her after ultrasound that she does have some uterine fibroids.    She is now having menses a proximally every 2nd 3rd month.    Dr. Rodriguez has also performed Pap smears.    After discussion in the exam room it was decided not to perform an examination and void this encounter.            Review of Systems      Objective:      Physical Exam    Assessment:       1. Fibroids        Plan:       Patient Instructions   Discussed we will void this examination.      Discussed I will notify Dr. Marcial who [facility] to have an appointment scheduled with Dr. Rodriguez    She will notify me if she does not receive appointment

## 2024-10-29 NOTE — PATIENT INSTRUCTIONS
Discussed we will void this examination.      Discussed I will notify Dr. Marcial who [facility] patient's  through [facility] to have an appointment scheduled with Dr. Rodriguez    She will notify me if she does not receive appointment    It was noted on initial check blood pressure 150/90.  However follow-up check in the office was noted to be 165/108 she admits to not taking blood pressure medication    She was instructed to go immediately to primary care where she will see either physician or nurse practitioner

## 2024-10-30 ENCOUNTER — OFFICE VISIT (OUTPATIENT)
Dept: FAMILY MEDICINE | Facility: CLINIC | Age: 49
End: 2024-10-30
Payer: OTHER GOVERNMENT

## 2024-10-30 VITALS
HEART RATE: 102 BPM | OXYGEN SATURATION: 98 % | HEIGHT: 66 IN | RESPIRATION RATE: 16 BRPM | SYSTOLIC BLOOD PRESSURE: 160 MMHG | BODY MASS INDEX: 26.68 KG/M2 | WEIGHT: 166 LBS | TEMPERATURE: 98 F | DIASTOLIC BLOOD PRESSURE: 100 MMHG

## 2024-10-30 DIAGNOSIS — I10 UNCONTROLLED HYPERTENSION: ICD-10-CM

## 2024-10-30 DIAGNOSIS — F43.10 PTSD (POST-TRAUMATIC STRESS DISORDER): ICD-10-CM

## 2024-10-30 DIAGNOSIS — M21.371 ACQUIRED RIGHT FOOT DROP: ICD-10-CM

## 2024-10-30 DIAGNOSIS — I10 PRIMARY HYPERTENSION: Chronic | ICD-10-CM

## 2024-10-30 DIAGNOSIS — F42.3 HOARDING DISORDER: ICD-10-CM

## 2024-10-30 DIAGNOSIS — Q66.6 PES PLANOVALGUS: ICD-10-CM

## 2024-10-30 DIAGNOSIS — G61.81 CIDP (CHRONIC INFLAMMATORY DEMYELINATING POLYNEUROPATHY): Primary | ICD-10-CM

## 2024-10-30 PROCEDURE — 99214 OFFICE O/P EST MOD 30 MIN: CPT | Mod: ,,, | Performed by: FAMILY MEDICINE

## 2024-10-30 RX ORDER — POTASSIUM CHLORIDE 20 MEQ/1
20 TABLET, EXTENDED RELEASE ORAL DAILY
Qty: 90 TABLET | Refills: 3 | Status: SHIPPED | OUTPATIENT
Start: 2024-10-30

## 2024-10-30 RX ORDER — LOSARTAN POTASSIUM 100 MG/1
100 TABLET ORAL DAILY
Qty: 90 TABLET | Refills: 3 | Status: SHIPPED | OUTPATIENT
Start: 2024-10-30 | End: 2024-10-31 | Stop reason: SDUPTHER

## 2024-10-31 DIAGNOSIS — I10 UNCONTROLLED HYPERTENSION: ICD-10-CM

## 2024-10-31 RX ORDER — LOSARTAN POTASSIUM 100 MG/1
100 TABLET ORAL DAILY
Qty: 90 TABLET | Refills: 3 | Status: SHIPPED | OUTPATIENT
Start: 2024-10-31

## 2024-11-05 ENCOUNTER — CLINICAL SUPPORT (OUTPATIENT)
Dept: REHABILITATION | Facility: HOSPITAL | Age: 49
End: 2024-11-05
Attending: INTERNAL MEDICINE
Payer: OTHER GOVERNMENT

## 2024-11-05 DIAGNOSIS — G61.81 CIDP (CHRONIC INFLAMMATORY DEMYELINATING POLYNEUROPATHY): Primary | ICD-10-CM

## 2024-11-05 DIAGNOSIS — M62.81 PROXIMAL LIMB MUSCLE WEAKNESS: ICD-10-CM

## 2024-11-05 DIAGNOSIS — Q66.6 PES PLANOVALGUS: ICD-10-CM

## 2024-11-05 DIAGNOSIS — M17.0 OSTEOARTHRITIS OF BOTH KNEES, UNSPECIFIED OSTEOARTHRITIS TYPE: ICD-10-CM

## 2024-11-05 DIAGNOSIS — R26.9 ABNORMAL GAIT: ICD-10-CM

## 2024-11-05 PROCEDURE — 97110 THERAPEUTIC EXERCISES: CPT

## 2024-11-05 PROCEDURE — 97163 PT EVAL HIGH COMPLEX 45 MIN: CPT

## 2024-11-05 NOTE — PLAN OF CARE
[facility]   Physical Therapy Initial Evaluation      Name: Laurel Mary  Clinic Number: [id number]    Therapy Diagnosis:   Encounter Diagnosis   Name Primary?    Osteoarthritis of both knees, unspecified osteoarthritis type         Physician: Noé Glass MD    Physician Orders: PT Eval and Treat   Medical Diagnosis from Referral: see above  Evaluation Date: 11/5/2024  Authorization Period Expiration: 3/4/2025  Plan of Care Expiration: 1/3/2025    Date of Surgery: n/a  Visit # / Visits authorized: 1/15   FOTO: 1/3 = 38    Precautions: Standard     Time In: 10:23 am  Time Out: 11:39 am  Total Billable Time: 76 minutes    Subjective     Date of onset: chronic    History of current condition - LAUREL reports: Dr. Camacho in [place] - neurologist; has seen Dr. 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; CIDP - has severe bilateral flat foot deformity which has caused marked genu valgum at both knees - foot drop - uses rollator for ambulation - has ligamentous laxity - severe hoarding disorder - supposed to be getting a psych consult - it is so bad she has been hospitalized for it - will also need an OT referral for hand weakness and upper extremity tremors - says her weakness has been progressing which is affecting her ability to walk and move around functionally    Falls: multiple    Imaging: xrays bilateral knees    Prior Therapy: none  Social History:  lives with her son who has autism but has another son who comes and checks on them as well as other friends/family - later she stated about her son having been shot but uncertain how long ago that was or how it has affected his function - also uncertain if that is the son that checks on her or the one with autism??  Occupation: not working  Prior Level of Function: uncertain how long it has been since she was completely independent   Current Level of Function: has assistance with activities of daily living  at times due to proximal weakness of bilateral upper extremities and lower extremities    Pain:  Current 6/10, worst 10/10, best 3/10   Location: bilateral feet, hips and back  Description: Aching, Burning, Throbbing, Sharp, and Variable  Aggravating Factors: Standing, Walking, and Getting out of bed/chair  Easing Factors: nothing    Patients goals: improve strength and ability to walk     Medical History:   Past Medical History:   Diagnosis Date    CIDP (chronic inflammatory demyelinating polyneuropathy) 3/3/2022    Foot drop     Hypertension     Osteoarthritis        Surgical History:   Laurel Mary  has a past surgical history that includes Endometrial biopsy.    Medications:   Laurel has a current medication list which includes the following prescription(s): aspirin, aspirin/salicylamide/caffeine, ergocalciferol, ferrous sulfate, folic acid, ibuprofen, losartan, mupirocin, potassium chloride sa, and prednisone.    Allergies:   Review of patient's allergies indicates:  No Known Allergies     Objective      Lower extremity range of motion is grossly within functional limits - severe genu valgum in both knees when standing due severe pes planus deformity - she also has foot drop with atrophy of musculature in her lower legs - states she is unable to roll from supine <> prone independently - this was not tested due to mat not wide enough to safely assess - required assistance to come up from supine either by therapist or pulling on sink that is in the treatment room next to mat - she requires assistance with dressing - she has more proximal weakness - she has poor eccentric control with sitting down to a chair - she definitely seems to have some neurological impairment - reports upper extremity weakness, especially hand - will reach out to PCP about OT referral - will also reach out to PCP about referral to an orthotist to be evaluated for custom orthotics      Gait:  Weight bearing precautions: WBAT  Assistive  device:  rollator  Ambulation distance and deviations: severe pes planus and genu valgum bilaterally - antalgic gait - crouched knee posture  Stairs: no steps at home - not assessed today  Comments:    Intake Outcome Measure for FOTO Knee Survey    Therapist reviewed FOTO scores for Laurel Mary on 11/5/2024.   FOTO report - see Media section or FOTO account episode details.    Intake Score: 29         Treatment     Total Treatment time (time-based codes) separate from Evaluation: 20 minutes     LAUREL received the treatments listed below:      Hooklying march, quad sets, seated hip abduction w/ band, seated short foot/arch lift ex, and long arc quad     Patient Education and Home Exercises     Education provided:   - evaluation results, plan of care and home exercise program     Written Home Exercises Provided: yes. Exercises were reviewed and LAUREL was able to demonstrate them prior to the end of the session.  LAUREL demonstrated good  understanding of the education provided. See EMR under Patient Instructions for exercises provided during therapy sessions.    Assessment     Laurel is a 49 y.o. female referred to outpatient Physical Therapy with a medical diagnosis of bilateral osteoarthritis of knees but patient has much more going on. 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 Patient presents with abnormal gait due to weakness, severe pes planus and genu valgum. She has foot drop bilaterally. She has an overall decrease in strength and functional mobility. She has had some recent falls due to this. She has seen multiple providers including primary care, ortho, podiatry, rheumatology and neurology. She was seeing Dr. Zhang for rheumatology but she is no longer here. Will work to try to improve her strength and balance to increase her safety with ambulation and functional mobility. Will request referral to OT for hand and upper  extremity weakness and to an orthotist to be evaluated for custom orthotics.    Patient prognosis is Good.   Patient will benefit from skilled outpatient Physical Therapy to address the deficits stated above and in the chart below, provide patient /family education, and to maximize patientt's level of independence.     Plan of care discussed with patient: Yes  Patient's spiritual, cultural and educational needs considered and patient is agreeable to the plan of care and goals as stated below:     Anticipated Barriers for therapy: CIDP    Medical Necessity is demonstrated by the following  History  Co-morbidities and personal factors that may impact the plan of care [] LOW: no personal factors / co-morbidities  [] MODERATE: 1-2 personal factors / co-morbidities  [x] HIGH: 3+ personal factors / co-morbidities    Moderate / High Support Documentation:   Co-morbidities affecting plan of care:   Past Medical History:   Diagnosis Date    CIDP (chronic inflammatory demyelinating polyneuropathy) 3/3/2022    Foot drop     Hypertension     Osteoarthritis    Severe hoarding disorder  Severe pes planus deformities bilateral   PTSD  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    Personal Factors:   no deficits     Examination  Body Structures and Functions, activity limitations and participation restrictions that may impact the plan of care [] LOW: addressing 1-2 elements  [] MODERATE: 3+ elements  [x] HIGH: 4+ elements (please support below)    Moderate / High Support Documentation: severe gait abnormality, lower extremity weakness, decreased bed mobility and overall functional mobility, pain, frequent falls, joint laxity/hypermobility     Clinical Presentation [] LOW: stable  [] MODERATE: Evolving  [x] HIGH: Unstable     Decision Making/ Complexity Score: high         GOALS:  1.  Patient to be independent with home exercise program to facilitate carryover between therapy visits.  2.  Patient will be able to perform bed mobility  independently for improved functional independence.  3.  Patient will be able to perform sit <>  a safe, controlled manner.  4.  Patient will ambulate independent with rollator and decreased pes planus and genu valgum with the aid of       appropriate orthotics.      Plan     Plan of care Certification: 11/5/2024 to 1/3/2025.    Outpatient Physical Therapy 2 times weekly for 7 weeks to include the following interventions: 48746 [therapeutic exercise], 78676 [neuromuscular re-education], 37942 [gait training], 87111 [manual therapy], 80856 [therapeutic activities], 31994 [aquatic therapy], 42377 [unattended electrical stimulation], and 74705 [biofeedback by any modality].     DONNA CANTU PT        Physician's Signature: _________________________________________ Date: ________________

## 2024-11-06 PROBLEM — R26.9 ABNORMAL GAIT: Status: ACTIVE | Noted: 2024-11-06

## 2024-11-07 ENCOUNTER — CLINICAL SUPPORT (OUTPATIENT)
Dept: REHABILITATION | Facility: HOSPITAL | Age: 49
End: 2024-11-07
Attending: INTERNAL MEDICINE
Payer: OTHER GOVERNMENT

## 2024-11-07 DIAGNOSIS — M62.81 PROXIMAL LIMB MUSCLE WEAKNESS: ICD-10-CM

## 2024-11-07 DIAGNOSIS — G61.81 CIDP (CHRONIC INFLAMMATORY DEMYELINATING POLYNEUROPATHY): ICD-10-CM

## 2024-11-07 DIAGNOSIS — Q66.6 PES PLANOVALGUS: Primary | ICD-10-CM

## 2024-11-07 DIAGNOSIS — R26.9 ABNORMAL GAIT: ICD-10-CM

## 2024-11-07 PROCEDURE — 97530 THERAPEUTIC ACTIVITIES: CPT

## 2024-11-07 PROCEDURE — 97110 THERAPEUTIC EXERCISES: CPT

## 2024-11-07 NOTE — PROGRESS NOTES
[facility]   Physical Therapy Treatment Note     Name: Laurel Mary  Clinic Number: [id number]    Therapy Diagnosis:   Encounter Diagnoses   Name Primary?    Pes planovalgus Yes    Proximal limb muscle weakness     CIDP (chronic inflammatory demyelinating polyneuropathy)     Abnormal gait      Physician: Noé Glass MD    Visit Date: 11/7/2024    Physician Orders: PT Eval and Treat  Medical Diagnosis from Referral: Osteoarthritis of both knees, unspecified osteoarthritis type   Evaluation Date: 11/5/2024  Updated Plan of Care Due : 1/3/2025  Authorization Period Expiration: 3/4/2025  Plan of Care Certification Period: 11/5/2024 to 1/3/2025  Visit # / Visits authorized: 2/15   PTA Visit #: 0    Time In: 1315  Time Out: 1400  Total Billable Time: 45 minutes    Precautions: Standard  FOTO: 1/3 = 38     Subjective     Pt reports: has not been doing her home exercises because she went to the podiatrist and they are just not sure what shoes she is going to need and that they say that she has weakness that she can't strengthen because it is inflammatory neuropathy that is progressive. Went to Dr Craft who was the orthopedic surgeon who doesn't think surgery would help due to proximal weakness.    She was not compliant with home exercise program.    Pain: 9/10 in back, 8/10 in ankles and feet   Location: bilateral back, ankles and feet    Objective       LAUREL received therapeutic exercises to develop education for 25 minutes including:  - patient education on importance of home program and why strengthening would still be beneficial despite potentially having a progressive condition    LAUREL participated in dynamic functional therapeutic activities to improve functional performance for 20  minutes, including:  - long arc quads 3x10 bilaterally   - seated marches 4x10 bilaterally   - seated hip abduction with yellow theraband 3x10       Home Exercises Provided and Patient  Education Provided     Education provided: extensive education provided to patient on the importance of performing HEP in order to maintain current level of function; HEP performance does not require large amount of space; maintaining current level of function or as much function is imperative regardless of if condition is progressive to delay need for further intervention or equipment.     Written Home Exercises Provided: yes.  Exercises were reviewed and DANIKA was able to demonstrate them prior to the end of the session.  DANIKA demonstrated poor understanding of the education provided.     See EMR under Patient Instructions for exercises provided prior visit.    Assessment     Patient tolerated session fairly this visit. Patient had difficulty performing exercises in a timely manner due to perseverating on concern over medical necessity of exercises and the need for them if condition is progressive. Assured patient that the exercises would ideally help slow progression of condition if it is progressive. Patient had difficulty performing marches with leg coming straight up, rather external rotating as the leg moved into hip flexion, despite physical and verbal cues to correct. Educated patient extensively on performing HEP even if she only has minimal space, patient verbalized some understanding by end of session.     DANIKA Is not progressing well towards her goals.   Pt prognosis is Fair.     Pt will continue to benefit from skilled outpatient physical therapy to address the deficits listed in the problem list box on initial evaluation, provide pt/family education and to maximize pt's level of independence in the home and community environment.     Pt's spiritual, cultural and educational needs considered and pt agreeable to plan of care and goals.     Anticipated barriers to physical therapy: CIDP    Goals:    1.  Patient to be independent with home exercise program to facilitate carryover between therapy  visits. Non compliant at this time  2.  Patient will be able to perform bed mobility independently for improved functional independence. Patient did not want to lay down due to back pain but was able to demonstrate independent bed mobility to return to sitting after lying down briefly by rolling to the let and coming to sit  3.  Patient will be able to perform sit <>  a safe, controlled manner. Unable to demonstrate with controlled lower   4.  Patient will ambulate independent with rollator and decreased pes planus and genu valgum with the aid of       appropriate orthotics. No orthotics at this time      Plan     Continue education on importance of performing exercises regardless of condition, as well as address strength deficits.     Dayana Herman, PT, DPT  11/7/2024

## 2024-11-08 DIAGNOSIS — Q66.6 PES PLANOVALGUS: Primary | ICD-10-CM

## 2024-11-08 DIAGNOSIS — G61.81 CIDP (CHRONIC INFLAMMATORY DEMYELINATING POLYNEUROPATHY): Primary | ICD-10-CM

## 2024-11-08 DIAGNOSIS — R29.898 WEAKNESS OF RIGHT UPPER EXTREMITY: ICD-10-CM

## 2024-11-12 ENCOUNTER — CLINICAL SUPPORT (OUTPATIENT)
Dept: REHABILITATION | Facility: HOSPITAL | Age: 49
End: 2024-11-12
Payer: OTHER GOVERNMENT

## 2024-11-12 DIAGNOSIS — R26.9 ABNORMAL GAIT: ICD-10-CM

## 2024-11-12 DIAGNOSIS — Q66.6 PES PLANOVALGUS: Primary | ICD-10-CM

## 2024-11-12 DIAGNOSIS — M62.81 PROXIMAL LIMB MUSCLE WEAKNESS: ICD-10-CM

## 2024-11-12 DIAGNOSIS — G61.81 CIDP (CHRONIC INFLAMMATORY DEMYELINATING POLYNEUROPATHY): ICD-10-CM

## 2024-11-12 PROCEDURE — 97110 THERAPEUTIC EXERCISES: CPT

## 2024-11-12 PROCEDURE — 97530 THERAPEUTIC ACTIVITIES: CPT

## 2024-11-12 NOTE — PROGRESS NOTES
[facility]   Physical Therapy Treatment Note     Name: Laurel Mary  Clinic Number: [id number]    Therapy Diagnosis:   Encounter Diagnoses   Name Primary?    Pes planovalgus Yes    Proximal limb muscle weakness     CIDP (chronic inflammatory demyelinating polyneuropathy)     Abnormal gait      Physician: Noé Glass MD    Visit Date: 11/12/2024    Physician Orders: PT Eval and Treat  Medical Diagnosis from Referral: Osteoarthritis of both knees, unspecified osteoarthritis type   Evaluation Date: 11/5/2024  Updated Plan of Care Due : 1/3/2025  Authorization Period Expiration: 3/4/2025  Plan of Care Certification Period: 11/5/2024 to 1/3/2025  Visit # / Visits authorized: 3/15   PTA Visit #: 0    Time In: 1310  Time Out: 1357  Total Billable Time: 47 minutes    Precautions: Standard  FOTO: 1/3 = 38     Subjective     Pt reports: has been doing her exercises at home but is frustrated with her walker and that it is not comfortable to walk with     She was not compliant with home exercise program.    Pain: 10/10 in back(still stated a 10 even after stating that a 10 is ER trip - but certain positions makes it better), /10 in ankles and feet - more numbness and weakness and more of a strain/popping feeling; its not really hurting now vs when I try to use my feet which is why when I walk I try to push myself up as much as I can on the walker because it feels like the bones are popping. Will not answer direct question about pain for feet, now relating the pain to the shoes and that Dr. Espinal says she needs different shoes, its uncomfortable to wear shoes but also makes a suction cup noise due to the flatness, more numbness that she is feeling     Location: bilateral back, ankles and feet    Objective       LAUREL received therapeutic exercises to develop education for 20 minutes including:  - long arc quads 3x10 bilaterally   - seated marches 4x10 bilaterally   - seated heel  raises 3x10   - isometric hip abduction with belt 20x3s    DANIKA participated in dynamic functional therapeutic activities to improve functional performance for 27  minutes, including:  - march with ball for core stabilization 2x5 bilaterally   - toe scrunches attempted - patient uable to perform   - attempted toe pickups unable to perform - patient with very poor volitional movement of toes of either foot   - sit to stand with elevation from mat x5 with verbal cues   - mini squats 2x10 with verbal cues       Home Exercises Provided and Patient Education Provided     Education provided: extensive education provided to patient on the importance of performing HEP in order to maintain current level of function; HEP performance does not require large amount of space; maintaining current level of function or as much function is imperative regardless of if condition is progressive to delay need for further intervention or equipment.     Written Home Exercises Provided: yes.  Exercises were reviewed and DANIKA was able to demonstrate them prior to the end of the session.  DANIKA demonstrated poor understanding of the education provided.     See EMR under Patient Instructions for exercises provided prior visit.    Assessment     Patient tolerated session well this visit. Patient with much improved attention to task and overall exercise form this visit. Patient also able to tolerate increased exercises, including addition of seated core work, and sit to stand. In one trial with contact guard assistance patient was able to demonstrate controlled lower from standing to an elevated sitting surface.     DANIKA Is not progressing well towards her goals.   Pt prognosis is Fair.     Pt will continue to benefit from skilled outpatient physical therapy to address the deficits listed in the problem list box on initial evaluation, provide pt/family education and to maximize pt's level of independence in the home and community  environment.     Pt's spiritual, cultural and educational needs considered and pt agreeable to plan of care and goals.     Anticipated barriers to physical therapy: CIDP    Goals:    1.  Patient to be independent with home exercise program to facilitate carryover between therapy visits. Reported compliance this visit  2.  Patient will be able to perform bed mobility independently for improved functional independence. Patient did not want to lay down due to back pain but was able to demonstrate independent bed mobility to return to sitting after lying down briefly by rolling to the let and coming to sit  3.  Patient will be able to perform sit <>  a safe, controlled manner. Controlled lower one trial  4.  Patient will ambulate independent with rollator and decreased pes planus and genu valgum with the aid of       appropriate orthotics. No orthotics at this time      Plan     Continue education on importance of performing exercises regardless of condition, as well as address strength deficits.     Dayana Herman, PT, DPT  11/12/2024         Render Post-Care Instructions In Note?: no Detail Level: Detailed Duration Of Freeze Thaw-Cycle (Seconds): 0 Post-Care Instructions: I reviewed with the patient in detail post-care instructions. Patient is to wear sunprotection, and avoid picking at any of the treated lesions. Pt may apply Vaseline to crusted or scabbing areas. Consent: The patient's consent was obtained including but not limited to risks of crusting, scabbing, blistering, scarring, darker or lighter pigmentary change, recurrence, incomplete removal and infection.

## 2024-11-14 ENCOUNTER — CLINICAL SUPPORT (OUTPATIENT)
Dept: REHABILITATION | Facility: HOSPITAL | Age: 49
End: 2024-11-14
Payer: OTHER GOVERNMENT

## 2024-11-14 DIAGNOSIS — Q66.6 PES PLANOVALGUS: Primary | ICD-10-CM

## 2024-11-14 DIAGNOSIS — M62.81 PROXIMAL LIMB MUSCLE WEAKNESS: ICD-10-CM

## 2024-11-14 DIAGNOSIS — R26.9 ABNORMAL GAIT: ICD-10-CM

## 2024-11-14 DIAGNOSIS — G61.81 CIDP (CHRONIC INFLAMMATORY DEMYELINATING POLYNEUROPATHY): ICD-10-CM

## 2024-11-14 PROCEDURE — 97530 THERAPEUTIC ACTIVITIES: CPT

## 2024-11-14 PROCEDURE — 97110 THERAPEUTIC EXERCISES: CPT

## 2024-11-14 NOTE — PROGRESS NOTES
[facility]   Physical Therapy Treatment Note     Name: Laurel Mary  Clinic Number: [id number]    Therapy Diagnosis:   Encounter Diagnoses   Name Primary?    Pes planovalgus Yes    Proximal limb muscle weakness     CIDP (chronic inflammatory demyelinating polyneuropathy)     Abnormal gait      Physician: Noé Glass MD    Visit Date: 11/14/2024    Physician Orders: PT Eval and Treat  Medical Diagnosis from Referral: Osteoarthritis of both knees, unspecified osteoarthritis type   Evaluation Date: 11/5/2024  Updated Plan of Care Due : 1/3/2025  Authorization Period Expiration: 3/4/2025  Plan of Care Certification Period: 11/5/2024 to 1/3/2025  Visit # / Visits authorized: 4/15   PTA Visit #: 0    Time In: 1318  Time Out: 1400  Total Billable Time: 42 minutes    Precautions: Standard  FOTO: 1/3 = 38     Subjective     Pt reports: she fell yesterday trying to get out of the car in the rain and hurt her shoulder, but reports she has been having problems with her shoulders for a long time and it is probably the proximal weakness based on what Dr. Braga said; does not feel like her shoulder or other body part needs to be checked out    She was not compliant with home exercise program.    Pain: 7/10 in back, 8/10 in feet and ankles, 8/10 in right shoulder at rest 9/10 when she tries to move it    Location: bilateral back, ankles and feet, shoulder (right)    Objective       LAUREL received therapeutic exercises to develop education for 25 minutes including:  - long arc quads 3x10 bilaterally   - seated marches 2x20 bilaterally   - seated heel raises 3x10   - isometric hip abduction with belt 20x3s  - patient education on alternating heel and toe raises in seated position for HEP     LAUREL participated in dynamic functional therapeutic activities to improve functional performance for 17  minutes, including:  - march with ball for core stabilization 2x5 bilaterally   - sit  to stand with elevation from mat x5 with verbal cues   - mini squats 2x10 with verbal cues       Home Exercises Provided and Patient Education Provided     Education provided: extensive education provided to patient on the importance of performing HEP in order to maintain current level of function; HEP performance does not require large amount of space; maintaining current level of function or as much function is imperative regardless of if condition is progressive to delay need for further intervention or equipment.     Written Home Exercises Provided: yes.  Exercises were reviewed and DANIKA was able to demonstrate them prior to the end of the session.  DANIKA demonstrated poor understanding of the education provided.     See EMR under Patient Instructions for exercises provided prior visit.    Assessment     Patient tolerated session well this visit. Patient able to tolerate increased reps of marching this visit, as well as increased reps of core work. Patient does report burning in her lower legs bilaterally following exercises today - points to anterior lower legs - between an ache and a burn. Educated patient on additional exercise to work tibialis anterior for home, alternating heel and toe raises, patient demonstrated and verbalized understanding.     DANIKA Is not progressing well towards her goals.   Pt prognosis is Fair.     Pt will continue to benefit from skilled outpatient physical therapy to address the deficits listed in the problem list box on initial evaluation, provide pt/family education and to maximize pt's level of independence in the home and community environment.     Pt's spiritual, cultural and educational needs considered and pt agreeable to plan of care and goals.     Anticipated barriers to physical therapy: CIDP    Goals:    1.  Patient to be independent with home exercise program to facilitate carryover between therapy visits. Reported compliance this visit  2.  Patient will be able  to perform bed mobility independently for improved functional independence. Patient did not want to lay down due to back pain but was able to demonstrate independent bed mobility to return to sitting after lying down briefly by rolling to the let and coming to sit  3.  Patient will be able to perform sit <>  a safe, controlled manner. Controlled lower one trial  4.  Patient will ambulate independent with rollator and decreased pes planus and genu valgum with the aid of       appropriate orthotics. No orthotics at this time      Plan     Continue education on importance of performing exercises regardless of condition, as well as address strength deficits.     Dayana Herman, PT, DPT  11/14/2024

## 2024-11-18 ENCOUNTER — HOSPITAL ENCOUNTER (EMERGENCY)
Facility: HOSPITAL | Age: 49
Discharge: HOME | End: 2024-11-18
Payer: OTHER GOVERNMENT

## 2024-11-18 VITALS
SYSTOLIC BLOOD PRESSURE: 147 MMHG | BODY MASS INDEX: 25.71 KG/M2 | HEART RATE: 101 BPM | WEIGHT: 160 LBS | DIASTOLIC BLOOD PRESSURE: 96 MMHG | OXYGEN SATURATION: 99 % | RESPIRATION RATE: 15 BRPM | HEIGHT: 66 IN | TEMPERATURE: 98 F

## 2024-11-18 DIAGNOSIS — I16.0 HYPERTENSIVE URGENCY: Primary | ICD-10-CM

## 2024-11-18 DIAGNOSIS — F41.9 ANXIETY: ICD-10-CM

## 2024-11-18 DIAGNOSIS — I10 HYPERTENSION: ICD-10-CM

## 2024-11-18 LAB
ALBUMIN SERPL BCP-MCNC: 3.9 G/DL (ref 3.5–5)
ALBUMIN/GLOB SERPL: 0.9 {RATIO}
ALP SERPL-CCNC: 79 U/L (ref 40–150)
ALT SERPL W P-5'-P-CCNC: 52 U/L (ref 0–55)
AMPHET UR QL SCN: NEGATIVE
ANION GAP SERPL CALCULATED.3IONS-SCNC: 20 MMOL/L (ref 7–16)
AST SERPL W P-5'-P-CCNC: 68 U/L (ref 5–34)
BACTERIA #/AREA URNS HPF: ABNORMAL /HPF
BARBITURATES UR QL SCN: NEGATIVE
BASOPHILS # BLD AUTO: 0.01 K/UL (ref 0–0.2)
BASOPHILS NFR BLD AUTO: 0.2 % (ref 0–1)
BENZODIAZ METAB UR QL SCN: NEGATIVE
BILIRUB SERPL-MCNC: 0.6 MG/DL
BILIRUB UR QL STRIP: NEGATIVE
BUN SERPL-MCNC: 13 MG/DL (ref 10–20)
BUN/CREAT SERPL: 16 (ref 6–20)
CALCIUM SERPL-MCNC: 9.6 MG/DL (ref 8.4–10.2)
CANNABINOIDS UR QL SCN: NEGATIVE
CHLORIDE SERPL-SCNC: 101 MMOL/L (ref 98–107)
CLARITY UR: CLEAR
CO2 SERPL-SCNC: 21 MMOL/L (ref 22–29)
COCAINE UR QL SCN: NEGATIVE
COLOR UR: ABNORMAL
CREAT SERPL-MCNC: 0.83 MG/DL (ref 0.55–1.02)
DIFFERENTIAL METHOD BLD: ABNORMAL
EGFR (NO RACE VARIABLE) (RUSH/TITUS): 86 ML/MIN/1.73M2
EOSINOPHIL # BLD AUTO: 0 K/UL (ref 0–0.5)
EOSINOPHIL NFR BLD AUTO: 0 % (ref 1–4)
ERYTHROCYTE [DISTWIDTH] IN BLOOD BY AUTOMATED COUNT: 14.6 % (ref 11.5–14.5)
GLOBULIN SER-MCNC: 4.5 G/DL (ref 2–4)
GLUCOSE SERPL-MCNC: 78 MG/DL (ref 74–100)
GLUCOSE UR STRIP-MCNC: NORMAL MG/DL
HCT VFR BLD AUTO: 42.9 % (ref 38–47)
HGB BLD-MCNC: 14.2 G/DL (ref 12–16)
IMM GRANULOCYTES # BLD AUTO: 0.01 K/UL (ref 0–0.04)
IMM GRANULOCYTES NFR BLD: 0.2 % (ref 0–0.4)
KETONES UR STRIP-SCNC: 80 MG/DL
LEUKOCYTE ESTERASE UR QL STRIP: NEGATIVE
LYMPHOCYTES # BLD AUTO: 0.77 K/UL (ref 1–4.8)
LYMPHOCYTES NFR BLD AUTO: 14.7 % (ref 27–41)
MCH RBC QN AUTO: 32.8 PG (ref 27–31)
MCHC RBC AUTO-ENTMCNC: 33.1 G/DL (ref 32–36)
MCV RBC AUTO: 99.1 FL (ref 80–96)
MONOCYTES # BLD AUTO: 0.61 K/UL (ref 0–0.8)
MONOCYTES NFR BLD AUTO: 11.6 % (ref 2–6)
MPC BLD CALC-MCNC: 9.4 FL (ref 9.4–12.4)
MUCOUS, UA: ABNORMAL /LPF
NEUTROPHILS # BLD AUTO: 3.84 K/UL (ref 1.8–7.7)
NEUTROPHILS NFR BLD AUTO: 73.3 % (ref 53–65)
NITRITE UR QL STRIP: NEGATIVE
NRBC # BLD AUTO: 0 X10E3/UL
NRBC, AUTO (.00): 0 %
OPIATES UR QL SCN: NEGATIVE
PCP UR QL SCN: NEGATIVE
PH UR STRIP: 6 PH UNITS
PLATELET # BLD AUTO: 270 K/UL (ref 150–400)
POTASSIUM SERPL-SCNC: 3.3 MMOL/L (ref 3.5–5.1)
PROT SERPL-MCNC: 8.4 G/DL (ref 6.4–8.3)
PROT UR QL STRIP: 30
RBC # BLD AUTO: 4.33 M/UL (ref 4.2–5.4)
RBC # UR STRIP: ABNORMAL /UL
RBC #/AREA URNS HPF: 1 /HPF
SODIUM SERPL-SCNC: 139 MMOL/L (ref 136–145)
SP GR UR STRIP: 1.02
SQUAMOUS #/AREA URNS LPF: ABNORMAL /HPF
UROBILINOGEN UR STRIP-ACNC: NORMAL MG/DL
WBC # BLD AUTO: 5.24 K/UL (ref 4.5–11)
WBC #/AREA URNS HPF: 3 /HPF

## 2024-11-18 PROCEDURE — 96374 THER/PROPH/DIAG INJ IV PUSH: CPT

## 2024-11-18 PROCEDURE — 99284 EMERGENCY DEPT VISIT MOD MDM: CPT | Mod: 25

## 2024-11-18 PROCEDURE — 81001 URINALYSIS AUTO W/SCOPE: CPT | Performed by: NURSE PRACTITIONER

## 2024-11-18 PROCEDURE — 80053 COMPREHEN METABOLIC PANEL: CPT | Performed by: NURSE PRACTITIONER

## 2024-11-18 PROCEDURE — 85025 COMPLETE CBC W/AUTO DIFF WBC: CPT | Performed by: NURSE PRACTITIONER

## 2024-11-18 PROCEDURE — 25000003 PHARM REV CODE 250: Performed by: NURSE PRACTITIONER

## 2024-11-18 PROCEDURE — 63600175 PHARM REV CODE 636 W HCPCS: Performed by: NURSE PRACTITIONER

## 2024-11-18 PROCEDURE — 93005 ELECTROCARDIOGRAM TRACING: CPT

## 2024-11-18 PROCEDURE — 80307 DRUG TEST PRSMV CHEM ANLYZR: CPT | Performed by: NURSE PRACTITIONER

## 2024-11-18 RX ORDER — HYDRALAZINE HYDROCHLORIDE 20 MG/ML
10 INJECTION INTRAMUSCULAR; INTRAVENOUS
Status: COMPLETED | OUTPATIENT
Start: 2024-11-18 | End: 2024-11-18

## 2024-11-18 RX ORDER — CLONIDINE HYDROCHLORIDE 0.1 MG/1
0.1 TABLET ORAL
Status: COMPLETED | OUTPATIENT
Start: 2024-11-18 | End: 2024-11-18

## 2024-11-18 RX ADMIN — HYDRALAZINE HYDROCHLORIDE 10 MG: 20 INJECTION INTRAMUSCULAR; INTRAVENOUS at 09:11

## 2024-11-18 RX ADMIN — POTASSIUM BICARBONATE 20 MEQ: 782 TABLET, EFFERVESCENT ORAL at 10:11

## 2024-11-18 RX ADMIN — CLONIDINE HYDROCHLORIDE 0.1 MG: 0.1 TABLET ORAL at 09:11

## 2024-11-19 NOTE — DISCHARGE INSTRUCTIONS
Continue your current prescribed medications.  Follow up with the primary care provider in the next 2 weeks for recheck.  Return to the ER with new or worsening symptoms.
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
## 2024-11-19 NOTE — ED NOTES
"Pt received from day shift RN. Pt states she has not been taking numerous medications due to being in between primary care, pharmacies, and family situations. Pt has no recollection of the medications she is supposed to be on, but gave the doctors names "dr. Rasmussen and Dr. Beavers"  Pt states she has a history of CIDP, seizures, osteoarthritis, and guillian barre. Pt says she takes HTN, seizure, and pain management medications. Pt also states she is incontinent bowel and bladder. Pt states she has NKDA. Pt states [facility] but has not been able to get an appointment made.  Pt in room with officer. Call light within reach to use.   "

## 2024-12-17 ENCOUNTER — OFFICE VISIT (OUTPATIENT)
Dept: INTERNAL MEDICINE | Facility: CLINIC | Age: 49
End: 2024-12-17
Payer: OTHER GOVERNMENT

## 2024-12-17 VITALS
WEIGHT: 166 LBS | BODY MASS INDEX: 26.68 KG/M2 | HEART RATE: 85 BPM | HEIGHT: 66 IN | DIASTOLIC BLOOD PRESSURE: 84 MMHG | SYSTOLIC BLOOD PRESSURE: 148 MMHG | OXYGEN SATURATION: 98 %

## 2024-12-17 DIAGNOSIS — D50.0 IRON DEFICIENCY ANEMIA DUE TO CHRONIC BLOOD LOSS: ICD-10-CM

## 2024-12-17 DIAGNOSIS — I10 PRIMARY HYPERTENSION: Chronic | ICD-10-CM

## 2024-12-17 DIAGNOSIS — M62.81 PROXIMAL LIMB MUSCLE WEAKNESS: ICD-10-CM

## 2024-12-17 DIAGNOSIS — Z00.00 ROUTINE GENERAL MEDICAL EXAMINATION AT A HEALTH CARE FACILITY: ICD-10-CM

## 2024-12-17 DIAGNOSIS — Q66.6 PES PLANOVALGUS: ICD-10-CM

## 2024-12-17 DIAGNOSIS — G61.81 CIDP (CHRONIC INFLAMMATORY DEMYELINATING POLYNEUROPATHY): ICD-10-CM

## 2024-12-17 DIAGNOSIS — S54.00XA: ICD-10-CM

## 2024-12-17 DIAGNOSIS — M21.371 ACQUIRED RIGHT FOOT DROP: ICD-10-CM

## 2024-12-17 DIAGNOSIS — M21.332 WRIST DROP, LEFT: Primary | ICD-10-CM

## 2024-12-17 PROCEDURE — 99204 OFFICE O/P NEW MOD 45 MIN: CPT | Mod: S$PBB,,, | Performed by: INTERNAL MEDICINE

## 2024-12-17 PROCEDURE — 99215 OFFICE O/P EST HI 40 MIN: CPT | Mod: PBBFAC | Performed by: INTERNAL MEDICINE

## 2024-12-17 PROCEDURE — 99999 PR PBB SHADOW E&M-EST. PATIENT-LVL V: CPT | Mod: PBBFAC,,, | Performed by: INTERNAL MEDICINE

## 2024-12-17 NOTE — PROGRESS NOTES
Subjective:       Patient ID: Laurel Mary is a 49 y.o. female.    Chief Complaint: Establish Care (Concerned with right foot area, states she has had an infection that is not getting well. States she has been dx with cellulitis in the past. She does not agree that is was cellulitis. Has seen podiatry for the issue and states that they did not help her. States [facility] told her it was in the [facility]. Has not had any surgery performed on the foot.)    Pt concerned she has osteomylitis  and is a bstrep carrier  and is chronically ill from it  many questions will refer to [facility] at [facility]  Review of Systems   Constitutional:  Negative for fatigue and unexpected weight change.   HENT:  Negative for ear pain and goiter.    Respiratory:  Negative for chest tightness and shortness of breath.    Cardiovascular:  Negative for chest pain, palpitations and leg swelling.   Gastrointestinal:  Negative for abdominal pain and reflux.   Integumentary:  Negative for rash and breast tenderness.   Neurological:  Negative for dizziness and weakness.   Hematological:  Negative for adenopathy.   Psychiatric/Behavioral:  Negative for behavioral problems.    Breast: Negative for tenderness      Objective:      Physical Exam  Constitutional:       Appearance: Normal appearance.   HENT:      Head: Normocephalic and atraumatic.      Right Ear: External ear normal.      Left Ear: External ear normal.      Mouth/Throat:      Pharynx: Oropharynx is clear.   Eyes:      Extraocular Movements: Extraocular movements intact.      Pupils: Pupils are equal, round, and reactive to light.   Cardiovascular:      Rate and Rhythm: Normal rate and regular rhythm.      Pulses: Normal pulses.      Heart sounds: Normal heart sounds.   Pulmonary:      Effort: Pulmonary effort is normal.      Breath sounds: Normal breath sounds.   Abdominal:      General: Abdomen is flat. Bowel sounds are normal.      Palpations: Abdomen is soft.   Musculoskeletal:          General: Normal range of motion.      Cervical back: Normal range of motion and neck supple.   Skin:     General: Skin is warm.      Capillary Refill: Capillary refill takes less than 2 seconds.   Neurological:      General: No focal deficit present.      Mental Status: She is alert.   Psychiatric:         Mood and Affect: Mood normal.         Thought Content: Thought content normal.         Judgment: Judgment normal.       Assessment:       1. Wrist drop, left    2. Ulnar nerve damage, initial encounter    3. CIDP (chronic inflammatory demyelinating polyneuropathy)    4. Acquired right foot drop    5. Primary hypertension    6. Iron deficiency anemia due to chronic blood loss    7. Proximal limb muscle weakness    8. Pes planovalgus    9. Routine general medical examination at a health care facility        Plan:         There are no Patient Instructions on file for this visit.      Problem List Items Addressed This Visit          Neuro    Wrist drop, left - Primary    Ulnar nerve damage, initial encounter    CIDP (chronic inflammatory demyelinating polyneuropathy)    Relevant Orders    Ambulatory referral/consult to Rheumatology    Acquired right foot drop       Cardiac/Vascular    Hypertension (Chronic)       Oncology    Iron deficiency anemia due to chronic blood loss       Orthopedic    Proximal limb muscle weakness    Pes planovalgus     Other Visit Diagnoses       Routine general medical examination at a health care facility

## 2024-12-20 ENCOUNTER — TELEPHONE (OUTPATIENT)
Dept: INFECTIOUS DISEASES | Facility: CLINIC | Age: 49
End: 2024-12-20

## 2024-12-20 NOTE — TELEPHONE ENCOUNTER
----- Message from James June sent at 12/20/2024 10:06 AM CST -----  Regarding: FW: New Patient  Contact: Patient    ----- Message -----  From: Jasmin Edgar  Sent: 12/20/2024   9:54 AM CST  To: #  Subject: New Patient                                      Type:  Sooner Apoointment Request    Caller is requesting a sooner appointment.  Caller declined first available appointment listed below.  Caller will not accept being placed on the waitlist and is requesting a message be sent to doctor.  Name of Caller: Patient   When is the first available appointment? Scheduling denied   Symptoms: streptococcus infection  Would the patient rather a call back or a response via [facility]? Call back   Best Call Back Number:  [phone]  Additional Information: Patient stated she seen her pcp and was informed they are not were not able to assist with infection and advised patient to be seen by infectious disease Patient is requesting a call back to schedule an appt with a physician in dept who can assist as soon as possible please assist

## 2024-12-20 NOTE — TELEPHONE ENCOUNTER
Spoke pt and informed her we would need as referral of the Dx she is explaining. She will contact the providers office to get referral put in EPIC.

## 2024-12-20 NOTE — TELEPHONE ENCOUNTER
----- Message from Nurse Hoang sent at 12/20/2024  9:58 AM CST -----  Regarding: FW: New Patient  Contact: Patient    ----- Message -----  From: Jasmin Edgar  Sent: 12/20/2024   9:54 AM CST  To: #  Subject: New Patient                                      Type:  Sooner Apoointment Request    Caller is requesting a sooner appointment.  Caller declined first available appointment listed below.  Caller will not accept being placed on the waitlist and is requesting a message be sent to doctor.  Name of Caller: Patient   When is the first available appointment? Scheduling denied   Symptoms: streptococcus infection  Would the patient rather a call back or a response via [facility]? Call back   Best Call Back Number:  [phone]  Additional Information: Patient stated she seen her pcp and was informed they are not were not able to assist with infection and advised patient to be seen by infectious disease Patient is requesting a call back to schedule an appt with a physician in dept who can assist as soon as possible please assist

## 2024-12-23 DIAGNOSIS — Z86.19 HISTORY OF GROUP B STREPTOCOCCUS (GBS) INFECTION: Primary | ICD-10-CM

## 2025-01-02 DIAGNOSIS — B95.1 GROUP BETA STREP POSITIVE: Primary | ICD-10-CM

## 2025-01-10 ENCOUNTER — OFFICE VISIT (OUTPATIENT)
Dept: FAMILY MEDICINE | Facility: CLINIC | Age: 50
End: 2025-01-10
Payer: OTHER GOVERNMENT

## 2025-01-10 VITALS
BODY MASS INDEX: 27.16 KG/M2 | DIASTOLIC BLOOD PRESSURE: 129 MMHG | HEIGHT: 66 IN | RESPIRATION RATE: 18 BRPM | SYSTOLIC BLOOD PRESSURE: 177 MMHG | TEMPERATURE: 97 F | WEIGHT: 169 LBS

## 2025-01-10 DIAGNOSIS — I10 PRIMARY HYPERTENSION: Chronic | ICD-10-CM

## 2025-01-10 DIAGNOSIS — R80.1 PERSISTENT PROTEINURIA: Primary | ICD-10-CM

## 2025-01-10 LAB
CREAT UR-MCNC: 80 MG/DL (ref 15–325)
PROT UR-MCNC: 13.2 MG/DL
PROT/CREAT 24H UR-RTO: 0.17 (ref 0–0.2)

## 2025-01-10 PROCEDURE — 82570 ASSAY OF URINE CREATININE: CPT | Mod: ,,, | Performed by: CLINICAL MEDICAL LABORATORY

## 2025-01-10 PROCEDURE — 99213 OFFICE O/P EST LOW 20 MIN: CPT | Mod: ,,, | Performed by: FAMILY MEDICINE

## 2025-01-10 PROCEDURE — 84156 ASSAY OF PROTEIN URINE: CPT | Mod: ,,, | Performed by: CLINICAL MEDICAL LABORATORY

## 2025-01-10 NOTE — PROGRESS NOTES
Antonio Wilson MD        PATIENT NAME: Laurel Mary  : 1975  DATE: 1/10/25  MRN: [id number]      Billing Provider: Antonio Wilson MD  Level of Service: ID OFFICE/OUTPT VISIT, EST, LEVL III, 20-29 MIN  Patient PCP Information       Provider PCP Type    Antonio Wilson MD General            Reason for Visit / Chief Complaint: Back Pain (Having back pain. Also complains of protein in urine, thinks it is an infection )       Update PCP  Update Chief Complaint         History of Present Illness / Problem Focused Workflow     Laurel Mary presents to the clinic with Back Pain (Having back pain. Also complains of protein in urine, thinks it is an infection )     Gp B strep in  and endometritis after .  Patient is concerned about several episodes of proteinuria.  She has not taken antihypertensives today    Back Pain  Pertinent negatives include no abdominal pain, chest pain, fever, headaches or weakness (global weakness).     Review of Systems     Review of Systems   Constitutional:  Negative for activity change, appetite change, fever and unexpected weight change.   HENT:  Negative for congestion, rhinorrhea, sinus pressure, sinus pain, sore throat and trouble swallowing.    Eyes:  Negative for photophobia, pain, discharge and visual disturbance.   Respiratory:  Negative for cough, chest tightness, wheezing and stridor.    Cardiovascular:  Negative for chest pain, palpitations and leg swelling.   Gastrointestinal:  Negative for abdominal pain, blood in stool, constipation, diarrhea and nausea.   Endocrine: Negative for polydipsia, polyphagia and polyuria.   Genitourinary:  Negative for difficulty urinating, flank pain and hematuria.   Musculoskeletal:  Positive for back pain and gait problem. Negative for arthralgias and neck pain.   Skin:  Negative for rash.   Allergic/Immunologic: Negative for food allergies.   Neurological:  Negative for dizziness, tremors, seizures, syncope, weakness (global  weakness) and headaches.   Psychiatric/Behavioral:  Negative for behavioral problems, confusion, decreased concentration, dysphoric mood and hallucinations. The patient is not nervous/anxious.         Medical / Social / Family History     Past Medical History:   Diagnosis Date    CIDP (chronic inflammatory demyelinating polyneuropathy) 3/3/2022    Foot drop     Hypertension     Osteoarthritis        Past Surgical History:   Procedure Laterality Date    ENDOMETRIAL BIOPSY         Social History  Ms.  reports that she has never smoked. She has never been exposed to tobacco smoke. She has never used smokeless tobacco. She reports current alcohol use. She reports that she does not use drugs.    Family History  Ms.'s family history includes Diabetes in her mother; Hypertension in her father and mother; Leukemia in her father.    Medications and Allergies     Medications  No outpatient medications have been marked as taking for the 1/10/25 encounter (Office Visit) with Antonio Wilson MD.       Allergies  Review of patient's allergies indicates:  No Known Allergies    Physical Examination     Vitals:    01/10/25 1126   BP: (!) 177/129   Resp: 18   Temp: 97.4 °F (36.3 °C)     Physical Exam  Constitutional:       General: She is not in acute distress.     Appearance: Normal appearance.   HENT:      Head: Normocephalic.      Right Ear: Tympanic membrane and ear canal normal.      Left Ear: Tympanic membrane and ear canal normal.      Nose: Nose normal.      Mouth/Throat:      Mouth: Mucous membranes are moist.      Pharynx: No oropharyngeal exudate.   Eyes:      Extraocular Movements: Extraocular movements intact.      Pupils: Pupils are equal, round, and reactive to light.   Cardiovascular:      Rate and Rhythm: Normal rate and regular rhythm.      Heart sounds: No murmur heard.  Pulmonary:      Effort: Pulmonary effort is normal.      Breath sounds: Normal breath sounds. No wheezing.   Abdominal:      General: Abdomen is  flat. Bowel sounds are normal.      Palpations: Abdomen is soft.      Hernia: No hernia is present.   Musculoskeletal:         General: Normal range of motion.      Cervical back: Normal range of motion and neck supple.      Right lower leg: No edema.      Left lower leg: No edema.   Lymphadenopathy:      Cervical: No cervical adenopathy.   Skin:     General: Skin is warm and dry.      Coloration: Skin is not jaundiced.      Findings: No lesion.   Neurological:      General: No focal deficit present.      Mental Status: She is alert and oriented to person, place, and time.      Cranial Nerves: No cranial nerve deficit.      Gait: Gait abnormal.   Psychiatric:         Mood and Affect: Mood normal.         Behavior: Behavior normal.         Judgment: Judgment normal.          Assessment and Plan (including Health Maintenance)      Problem List  Smart Sets  Document Outside HM   :    Plan:           Health Maintenance Due   Topic Date Due    HIV Screening  Never done    Mammogram  Never done    Colorectal Cancer Screening  Never done    Influenza Vaccine (1) 09/01/2024    COVID-19 Vaccine (3 - 2024-25 season) 09/01/2024    Shingles Vaccine (1 of 2) Never done    Pneumococcal Vaccines (Age 50+) (1 of 1 - PCV) Never done       1. Persistent proteinuria  -     Protein/Creatinine Ratio, Urine    2. Primary hypertension         Health Maintenance Topics with due status: Not Due       Topic Last Completion Date    TETANUS VACCINE 08/10/2015    Cervical Cancer Screening 06/02/2022    Hemoglobin A1c (Diabetic Prevention Screening) 09/19/2024    Lipid Panel 09/19/2024    RSV Vaccine (Age 60+ and Pregnant patients) Not Due       Future Appointments   Date Time Provider Department Center   1/13/2025  2:30 PM Antonio Wilson MD [facility]   1/27/2025  8:10 AM Antonio Wilson MD [facility]   2/6/2025  9:30 AM Ruth Silva MD [facility] [address]        There are no Patient Instructions on file  for this visit.  Follow up in 6 months (on 7/10/2025), or if symptoms worsen or fail to improve, for routine followup.     Signature:  Antonio Wilson MD      Date of encounter: 1/10/25

## 2025-01-13 ENCOUNTER — OFFICE VISIT (OUTPATIENT)
Dept: FAMILY MEDICINE | Facility: CLINIC | Age: 50
End: 2025-01-13

## 2025-01-13 VITALS
HEIGHT: 66 IN | BODY MASS INDEX: 27.16 KG/M2 | RESPIRATION RATE: 20 BRPM | DIASTOLIC BLOOD PRESSURE: 141 MMHG | WEIGHT: 169 LBS | OXYGEN SATURATION: 99 % | HEART RATE: 89 BPM | SYSTOLIC BLOOD PRESSURE: 195 MMHG | TEMPERATURE: 97 F

## 2025-01-13 DIAGNOSIS — D80.2 SELECTIVE IGA IMMUNODEFICIENCY: ICD-10-CM

## 2025-01-13 DIAGNOSIS — D80.9 SELECTIVE IMMUNOGLOBULIN DEFICIENCY: ICD-10-CM

## 2025-01-13 DIAGNOSIS — D80.5 IMMUNODEFICIENCY WITH INCREASED IGM: ICD-10-CM

## 2025-01-13 DIAGNOSIS — D80.3 SELECTIVE DEFICIENCY OF IGG: ICD-10-CM

## 2025-01-13 DIAGNOSIS — M25.551 RIGHT HIP PAIN: Primary | ICD-10-CM

## 2025-01-13 LAB
IGA SERPL-MCNC: 376 MG/DL (ref 65–421)
IGG SERPL-MCNC: 1506 MG/DL (ref 522–1631)
IGM SERPL-MCNC: 63 MG/DL (ref 33–293)

## 2025-01-13 PROCEDURE — 99213 OFFICE O/P EST LOW 20 MIN: CPT | Mod: ,,, | Performed by: FAMILY MEDICINE

## 2025-01-13 PROCEDURE — 82785 ASSAY OF IGE: CPT | Mod: 90,,, | Performed by: CLINICAL MEDICAL LABORATORY

## 2025-01-13 PROCEDURE — 86334 IMMUNOFIX E-PHORESIS SERUM: CPT | Mod: 26,,, | Performed by: PATHOLOGY

## 2025-01-13 PROCEDURE — 86334 IMMUNOFIX E-PHORESIS SERUM: CPT | Mod: ,,, | Performed by: CLINICAL MEDICAL LABORATORY

## 2025-01-13 PROCEDURE — 82784 ASSAY IGA/IGD/IGG/IGM EACH: CPT | Mod: ,,, | Performed by: CLINICAL MEDICAL LABORATORY

## 2025-01-13 NOTE — PROGRESS NOTES
Antonio Wilson MD        PATIENT NAME: Laurel Mary  : 1975  DATE: 25  MRN: [id number]      Billing Provider: Antonio Wilson MD  Level of Service: [place] OFFICE/OUTPT VISIT, EST, LEVL III, 20-29 MIN  Patient PCP Information       Provider PCP Type    Antonio Wilson MD General            Reason for Visit / Chief Complaint: Back Pain (Something is wrong with back or spine. Severe pain)       Update PCP  Update Chief Complaint         History of Present Illness / Problem Focused Workflow     Laurel Mary presents to the clinic with Back Pain (Something is wrong with back or spine. Severe pain)     Patient is concerned about some sclerosis noted in her pelvis on a film done in .  We would like follow-up.  Also did have some electrophoresis studies ordered by her infectious disease physician we will do those today.    Back Pain  Pertinent negatives include no abdominal pain, chest pain, fever, headaches or weakness (global weakness).       Review of Systems     Review of Systems   Constitutional:  Negative for activity change, appetite change, fever and unexpected weight change.   HENT:  Negative for congestion, rhinorrhea, sinus pressure, sinus pain, sore throat and trouble swallowing.    Eyes:  Negative for photophobia, pain, discharge and visual disturbance.   Respiratory:  Negative for cough, chest tightness, wheezing and stridor.    Cardiovascular:  Negative for chest pain, palpitations and leg swelling.   Gastrointestinal:  Negative for abdominal pain, blood in stool, constipation, diarrhea and nausea.   Endocrine: Negative for polydipsia, polyphagia and polyuria.   Genitourinary:  Negative for difficulty urinating, flank pain and hematuria.   Musculoskeletal:  Positive for arthralgias, back pain and gait problem. Negative for neck pain.   Skin:  Negative for rash.   Allergic/Immunologic: Negative for food allergies.   Neurological:  Negative for dizziness, tremors, seizures, syncope, weakness  (global weakness) and headaches.   Psychiatric/Behavioral:  Negative for behavioral problems, confusion, decreased concentration, dysphoric mood and hallucinations. The patient is not nervous/anxious.         Medical / Social / Family History     Past Medical History:   Diagnosis Date    CIDP (chronic inflammatory demyelinating polyneuropathy) 3/3/2022    Foot drop     Hypertension     Osteoarthritis        Past Surgical History:   Procedure Laterality Date    ENDOMETRIAL BIOPSY         Social History  Ms.  reports that she has never smoked. She has never been exposed to tobacco smoke. She has never used smokeless tobacco. She reports current alcohol use. She reports that she does not use drugs.    Family History  Ms.'s family history includes Diabetes in her mother; Hypertension in her father and mother; Leukemia in her father.    Medications and Allergies     Medications  No outpatient medications have been marked as taking for the 1/13/25 encounter (Office Visit) with Antonio Wilson MD.       Allergies  Review of patient's allergies indicates:  No Known Allergies    Physical Examination     Vitals:    01/13/25 1440   BP: (!) 195/141   Pulse: 89   Resp: 20   Temp: 97.2 °F (36.2 °C)     Physical Exam  Constitutional:       General: She is not in acute distress.     Appearance: Normal appearance.   HENT:      Head: Normocephalic.      Right Ear: Tympanic membrane and ear canal normal.      Left Ear: Tympanic membrane and ear canal normal.      Nose: Nose normal.      Mouth/Throat:      Mouth: Mucous membranes are moist.      Pharynx: No oropharyngeal exudate.   Eyes:      Extraocular Movements: Extraocular movements intact.      Pupils: Pupils are equal, round, and reactive to light.   Cardiovascular:      Rate and Rhythm: Normal rate and regular rhythm.      Heart sounds: No murmur heard.  Pulmonary:      Effort: Pulmonary effort is normal.      Breath sounds: Normal breath sounds. No wheezing.   Abdominal:       General: Abdomen is flat. Bowel sounds are normal.      Palpations: Abdomen is soft.      Hernia: No hernia is present.   Musculoskeletal:         General: Deformity present. Normal range of motion.      Cervical back: Normal range of motion and neck supple.      Right lower leg: No edema.      Left lower leg: No edema.      Comments: Hallux valgus bilateral   Lymphadenopathy:      Cervical: No cervical adenopathy.   Skin:     General: Skin is warm and dry.      Coloration: Skin is not jaundiced.      Findings: No lesion.   Neurological:      General: No focal deficit present.      Mental Status: She is alert and oriented to person, place, and time.      Cranial Nerves: No cranial nerve deficit.      Gait: Gait normal.   Psychiatric:         Mood and Affect: Mood normal.         Behavior: Behavior normal.         Judgment: Judgment normal.          Assessment and Plan (including Health Maintenance)      Problem List  Smart Sets  Document Outside HM   :    Plan:     1. Selective IgA immunodeficiency    -     IgA    2. Selective deficiency of IgG    -     IgG    3. Immunodeficiency with increased IgM    -     IgM    4. Selective immunoglobulin deficiency    -     IgE  -     Immunofixation Electrophoresis    5. Right hip pain    -     X-Ray Hip 2 or 3 views Right with Pelvis when performed; Future; Expected date: 01/13/2025          Health Maintenance Due   Topic Date Due    HIV Screening  Never done    Mammogram  Never done    Colorectal Cancer Screening  Never done    Influenza Vaccine (1) 09/01/2024    COVID-19 Vaccine (3 - 2024-25 season) 09/01/2024    Shingles Vaccine (1 of 2) Never done    Pneumococcal Vaccines (Age 50+) (1 of 1 - PCV) Never done       1. Right hip pain  -     X-Ray Hip 2 or 3 views Right with Pelvis when performed; Future; Expected date: 01/13/2025    2. Selective IgA immunodeficiency  -     IgA    3. Selective deficiency of IgG  -     IgG    4. Immunodeficiency with increased IgM  -      IgM    5. Selective immunoglobulin deficiency  -     IgE  -     Immunofixation Electrophoresis         Health Maintenance Topics with due status: Not Due       Topic Last Completion Date    TETANUS VACCINE 08/10/2015    Cervical Cancer Screening 06/02/2022    Hemoglobin A1c (Diabetic Prevention Screening) 09/19/2024    Lipid Panel 09/19/2024    RSV Vaccine (Age 60+ and Pregnant patients) Not Due       Future Appointments   Date Time Provider Department Center   1/27/2025  8:10 AM Antonio Wilson MD [facility]   2/6/2025  9:30 AM Ruth Silva MD [facility] [address]        There are no Patient Instructions on file for this visit.  Follow up if symptoms worsen or fail to improve.     Signature:  Antonio Wilson MD      Date of encounter: 1/13/25

## 2025-01-14 LAB
IGA SER QL IFE: NORMAL
IGA SERPL-MCNC: 373 MG/DL (ref 65–421)
IGG SER QL IFE: NORMAL
IGG SERPL-MCNC: 1523 MG/DL (ref 522–1631)
IGM SER QL IFE: NORMAL
IGM SERPL-MCNC: 62 MG/DL (ref 33–293)
KAPPA LC SER QL ELPH: NORMAL
KAPPA LC SER QL IFE: NORMAL
LAMBDA LC SER QL ELPH: NORMAL
LAMBDA LC SER QL IFE: NORMAL
PATH INTERP BLD-IMP: NORMAL

## 2025-01-16 LAB — IGE SERPL-ACNC: 39.2 KU/L

## 2025-01-17 ENCOUNTER — PATIENT MESSAGE (OUTPATIENT)
Dept: FAMILY MEDICINE | Facility: CLINIC | Age: 50
End: 2025-01-17
Payer: OTHER GOVERNMENT

## 2025-01-22 ENCOUNTER — OFFICE VISIT (OUTPATIENT)
Dept: FAMILY MEDICINE | Facility: CLINIC | Age: 50
End: 2025-01-22
Payer: OTHER GOVERNMENT

## 2025-01-22 VITALS
HEIGHT: 66 IN | WEIGHT: 169 LBS | BODY MASS INDEX: 27.16 KG/M2 | SYSTOLIC BLOOD PRESSURE: 181 MMHG | DIASTOLIC BLOOD PRESSURE: 146 MMHG | TEMPERATURE: 98 F | HEART RATE: 103 BPM | RESPIRATION RATE: 20 BRPM | OXYGEN SATURATION: 99 %

## 2025-01-22 DIAGNOSIS — G61.81 CIDP (CHRONIC INFLAMMATORY DEMYELINATING POLYNEUROPATHY): Primary | ICD-10-CM

## 2025-01-22 DIAGNOSIS — Q66.6 PES PLANOVALGUS: ICD-10-CM

## 2025-01-22 DIAGNOSIS — I10 PRIMARY HYPERTENSION: ICD-10-CM

## 2025-01-22 DIAGNOSIS — N73.9 PELVIC INFECTION IN FEMALE: Chronic | ICD-10-CM

## 2025-01-22 PROCEDURE — 99499 UNLISTED E&M SERVICE: CPT | Mod: ,,, | Performed by: FAMILY MEDICINE

## 2025-01-22 RX ORDER — AMLODIPINE BESYLATE 5 MG/1
5 TABLET ORAL DAILY
Qty: 90 TABLET | Refills: 3 | Status: SHIPPED | OUTPATIENT
Start: 2025-01-22 | End: 2026-01-22

## 2025-01-22 RX ORDER — LOSARTAN POTASSIUM AND HYDROCHLOROTHIAZIDE 25; 100 MG/1; MG/1
1 TABLET ORAL DAILY
Qty: 90 TABLET | Refills: 3 | Status: SHIPPED | OUTPATIENT
Start: 2025-01-22 | End: 2026-01-22

## 2025-01-23 NOTE — PROGRESS NOTES
Antonio Wilson MD        PATIENT NAME: Laurel Mary  : 1975  DATE: 25  MRN: [id number]      Billing Provider: Antonio Wilson MD  Level of Service: NO LOS  Patient PCP Information       Provider PCP Type    Antonio Wilson MD General            Reason for Visit / Chief Complaint: Referral (Needs papers filled out for prosthesis )       Update PCP  Update Chief Complaint         History of Present Illness / Problem Focused Workflow     Laurel Mary presents to the clinic with Referral (Needs papers filled out for prosthesis )     For overall follow-up.  Patient does need help in completing some forms [facility].  She is unchanged overall.        Review of Systems     Review of Systems   Constitutional:  Negative for activity change, appetite change, fever and unexpected weight change.   HENT:  Negative for congestion, rhinorrhea, sinus pressure, sinus pain, sore throat and trouble swallowing.    Eyes:  Negative for photophobia, pain, discharge and visual disturbance.   Respiratory:  Negative for cough, chest tightness, wheezing and stridor.    Cardiovascular:  Negative for chest pain, palpitations and leg swelling.   Gastrointestinal:  Negative for abdominal pain, blood in stool, constipation, diarrhea and nausea.   Endocrine: Negative for polydipsia, polyphagia and polyuria.   Genitourinary:  Negative for difficulty urinating, flank pain and hematuria.   Musculoskeletal:  Positive for arthralgias and gait problem. Negative for neck pain.   Skin:  Negative for rash.   Allergic/Immunologic: Negative for food allergies.   Neurological:  Negative for dizziness, tremors, seizures, syncope, weakness (global weakness) and headaches.   Psychiatric/Behavioral:  Negative for behavioral problems, confusion, decreased concentration, dysphoric mood and hallucinations. The patient is not nervous/anxious.         Medical / Social / Family History      Past Medical History:   Diagnosis Date    CIDP (chronic inflammatory demyelinating polyneuropathy) 3/3/2022    Foot drop     Hypertension     Osteoarthritis        Past Surgical History:   Procedure Laterality Date    ENDOMETRIAL BIOPSY         Social History  Ms.  reports that she has never smoked. She has never been exposed to tobacco smoke. She has never used smokeless tobacco. She reports current alcohol use. She reports that she does not use drugs.    Family History  Ms.'s family history includes Diabetes in her mother; Hypertension in her father and mother; Leukemia in her father.    Medications and Allergies     Medications  No outpatient medications have been marked as taking for the 1/22/25 encounter (Office Visit) with Antonio Wilson MD.       Allergies  Review of patient's allergies indicates:  No Known Allergies    Physical Examination     Vitals:    01/22/25 1034   BP: (!) 181/146   Pulse: 103   Resp: 20   Temp: 97.5 °F (36.4 °C)     Physical Exam  Constitutional:       General: She is not in acute distress.     Appearance: Normal appearance.   HENT:      Head: Normocephalic.      Right Ear: Tympanic membrane and ear canal normal.      Left Ear: Tympanic membrane and ear canal normal.      Nose: Nose normal.      Mouth/Throat:      Mouth: Mucous membranes are moist.      Pharynx: No oropharyngeal exudate.   Eyes:      Extraocular Movements: Extraocular movements intact.      Pupils: Pupils are equal, round, and reactive to light.   Cardiovascular:      Rate and Rhythm: Normal rate and regular rhythm.      Heart sounds: No murmur heard.  Pulmonary:      Effort: Pulmonary effort is normal.      Breath sounds: Normal breath sounds. No wheezing.   Abdominal:      General: Abdomen is flat. Bowel sounds are normal.      Palpations: Abdomen is soft.      Hernia: No hernia is present.   Musculoskeletal:         General: Deformity present. Normal range of motion.      Cervical back: Normal range of motion  and neck supple.      Right lower leg: No edema.      Left lower leg: No edema.   Lymphadenopathy:      Cervical: No cervical adenopathy.   Skin:     General: Skin is warm and dry.      Coloration: Skin is not jaundiced.      Findings: No lesion.   Neurological:      General: No focal deficit present.      Mental Status: She is alert and oriented to person, place, and time.      Cranial Nerves: No cranial nerve deficit.      Gait: Gait abnormal.   Psychiatric:         Mood and Affect: Mood normal.         Behavior: Behavior normal.         Judgment: Judgment normal.          Assessment and Plan (including Health Maintenance)      Problem List  Smart Sets  Document Outside HM   :    Plan:     1. Primary hypertension  The current medical regimen is effective;  continue present plan and medications.  -     amLODIPine (NORVASC) 5 MG tablet; Take 1 tablet (5 mg total) by mouth once daily.  Dispense: 90 tablet; Refill: 3  -     losartan-hydrochlorothiazide 100-25 mg (HYZAAR) 100-25 mg per tablet; Take 1 tablet by mouth once daily.  Dispense: 90 tablet; Refill: 3    2. Pelvic infection in female            Health Maintenance Due   Topic Date Due    HIV Screening  Never done    Mammogram  Never done    Colorectal Cancer Screening  Never done    Influenza Vaccine (1) 09/01/2024    COVID-19 Vaccine (3 - 2024-25 season) 09/01/2024    Shingles Vaccine (1 of 2) Never done    Pneumococcal Vaccines (Age 50+) (1 of 1 - PCV) Never done       1. CIDP (chronic inflammatory demyelinating polyneuropathy)    2. Primary hypertension  -     amLODIPine (NORVASC) 5 MG tablet; Take 1 tablet (5 mg total) by mouth once daily.  Dispense: 90 tablet; Refill: 3  -     losartan-hydrochlorothiazide 100-25 mg (HYZAAR) 100-25 mg per tablet; Take 1 tablet by mouth once daily.  Dispense: 90 tablet; Refill: 3    3. Pelvic infection in female    4. Pes planovalgus         Health Maintenance Topics with due status: Not Due       Topic Last Completion Date     TETANUS VACCINE 08/10/2015    Cervical Cancer Screening 06/02/2022    Hemoglobin A1c (Diabetic Prevention Screening) 09/19/2024    Lipid Panel 09/19/2024    RSV Vaccine (Age 60+ and Pregnant patients) Not Due       Future Appointments   Date Time Provider Department Center   2/6/2025  9:30 AM Ruth Silva MD [facility] [address]        There are no Patient Instructions on file for this visit.  Follow up if symptoms worsen or fail to improve.     Signature:  Antonio Wilson MD      Date of encounter: 1/22/25

## 2025-03-26 ENCOUNTER — OFFICE VISIT (OUTPATIENT)
Dept: FAMILY MEDICINE | Facility: CLINIC | Age: 50
End: 2025-03-26
Payer: OTHER GOVERNMENT

## 2025-03-26 VITALS
RESPIRATION RATE: 16 BRPM | HEART RATE: 113 BPM | BODY MASS INDEX: 27.16 KG/M2 | HEIGHT: 66 IN | WEIGHT: 169 LBS | SYSTOLIC BLOOD PRESSURE: 160 MMHG | OXYGEN SATURATION: 95 % | DIASTOLIC BLOOD PRESSURE: 102 MMHG

## 2025-03-26 DIAGNOSIS — I10 PRIMARY HYPERTENSION: Chronic | ICD-10-CM

## 2025-03-26 DIAGNOSIS — R00.0 TACHYCARDIA: Primary | ICD-10-CM

## 2025-03-26 LAB
T4 FREE SERPL-MCNC: 1.06 NG/DL (ref 0.7–1.48)
TSH SERPL DL<=0.005 MIU/L-ACNC: 0.93 UIU/ML (ref 0.35–4.94)

## 2025-03-26 PROCEDURE — 84443 ASSAY THYROID STIM HORMONE: CPT | Mod: ,,, | Performed by: CLINICAL MEDICAL LABORATORY

## 2025-03-26 PROCEDURE — 84439 ASSAY OF FREE THYROXINE: CPT | Mod: ,,, | Performed by: CLINICAL MEDICAL LABORATORY

## 2025-03-26 PROCEDURE — 99213 OFFICE O/P EST LOW 20 MIN: CPT | Mod: ,,, | Performed by: FAMILY MEDICINE

## 2025-03-26 RX ORDER — CARVEDILOL 6.25 MG/1
6.25 TABLET ORAL 2 TIMES DAILY WITH MEALS
Qty: 180 TABLET | Refills: 3 | Status: SHIPPED | OUTPATIENT
Start: 2025-03-26 | End: 2026-03-26

## 2025-03-26 NOTE — PROGRESS NOTES
Antonio Wilson MD        PATIENT NAME: Laurel Mary  : 1975  DATE: 3/26/25  MRN: [id number]      Billing Provider: Antonio Wilson MD  Level of Service: MN OFFICE/OUTPT VISIT, EST, LEVL III, 20-29 MIN  Patient PCP Information       Provider PCP Type    Antonio Wilson MD General            Reason for Visit / Chief Complaint: Neurologic Problem (Go over test results from neurology need repeat ASO titer and protein in kidney)       Update PCP  Update Chief Complaint         History of Present Illness / Problem Focused Workflow     Laurel Mary presents to the clinic with Neurologic Problem (Go over test results from neurology need repeat ASO titer and protein in kidney)     Pt has seen ID at [facility] and has elevated ASO titer.      Neurologic Problem  The patient's pertinent negatives include no syncope or weakness (global weakness). Associated symptoms include fatigue. Pertinent negatives include no abdominal pain, chest pain, confusion, dizziness, fever, headaches, nausea, neck pain or palpitations.     Review of Systems     Review of Systems   Constitutional:  Positive for fatigue. Negative for activity change, appetite change, fever and unexpected weight change.   HENT:  Negative for congestion, rhinorrhea, sinus pressure, sinus pain, sore throat and trouble swallowing.    Eyes:  Negative for photophobia, pain, discharge and visual disturbance.   Respiratory:  Negative for cough, chest tightness, wheezing and stridor.    Cardiovascular:  Negative for chest pain, palpitations and leg swelling.   Gastrointestinal:  Negative for abdominal pain, blood in stool, constipation, diarrhea and nausea.   Endocrine: Negative for polydipsia, polyphagia and polyuria.   Genitourinary:  Negative for difficulty urinating, flank pain and hematuria.   Musculoskeletal:  Positive for arthralgias. Negative for neck pain.   Skin:  Negative for rash.   Allergic/Immunologic: Negative for food allergies.   Neurological:   Negative for dizziness, tremors, seizures, syncope, weakness (global weakness) and headaches.   Psychiatric/Behavioral:  Negative for behavioral problems, confusion, decreased concentration, dysphoric mood and hallucinations. The patient is not nervous/anxious.         Medical / Social / Family History     Past Medical History:   Diagnosis Date    CIDP (chronic inflammatory demyelinating polyneuropathy) 3/3/2022    Foot drop     Hypertension     Osteoarthritis        Past Surgical History:   Procedure Laterality Date    ENDOMETRIAL BIOPSY         Social History  Ms.  reports that she has never smoked. She has never been exposed to tobacco smoke. She has never used smokeless tobacco. She reports current alcohol use. She reports that she does not use drugs.    Family History  Ms.'s family history includes Diabetes in her mother; Hypertension in her father and mother; Leukemia in her father.    Medications and Allergies     Medications  No outpatient medications have been marked as taking for the 3/26/25 encounter (Office Visit) with Antonio Wilson MD.       Allergies  Review of patient's allergies indicates:  No Known Allergies    Physical Examination     Vitals:    03/26/25 1545   BP: (!) 160/102   Pulse: (!) 113   Resp: 16     Physical Exam  Constitutional:       General: She is not in acute distress.     Appearance: Normal appearance.   HENT:      Head: Normocephalic.      Right Ear: Tympanic membrane and ear canal normal.      Left Ear: Tympanic membrane and ear canal normal.      Nose: Nose normal.      Mouth/Throat:      Mouth: Mucous membranes are moist.      Pharynx: No oropharyngeal exudate.   Eyes:      Extraocular Movements: Extraocular movements intact.      Pupils: Pupils are equal, round, and reactive to light.   Cardiovascular:      Rate and Rhythm: Normal rate and regular rhythm.      Heart sounds: No murmur heard.  Pulmonary:      Effort: Pulmonary effort is normal.      Breath sounds: Normal  breath sounds. No wheezing.   Abdominal:      General: Abdomen is flat. Bowel sounds are normal.      Palpations: Abdomen is soft.      Hernia: No hernia is present.   Musculoskeletal:         General: Normal range of motion.      Cervical back: Normal range of motion and neck supple.      Right lower leg: No edema.      Left lower leg: No edema.   Lymphadenopathy:      Cervical: No cervical adenopathy.   Skin:     General: Skin is warm and dry.      Coloration: Skin is not jaundiced.      Findings: No lesion.   Neurological:      General: No focal deficit present.      Mental Status: She is alert and oriented to person, place, and time. Mental status is at baseline.      Cranial Nerves: No cranial nerve deficit.      Gait: Gait normal.   Psychiatric:         Mood and Affect: Mood normal.         Behavior: Behavior normal.         Judgment: Judgment normal.          Assessment and Plan (including Health Maintenance)      Problem List  Smart Sets  Document Outside HM   :    Plan:           Health Maintenance Due   Topic Date Due    Mammogram  Never done    Colorectal Cancer Screening  Never done    Shingles Vaccine (1 of 2) Never done    Pneumococcal Vaccines (Age 50+) (1 of 1 - PCV) Never done    Cervical Cancer Screening  06/02/2025       1. Tachycardia  -     Thyroid Panel; Future; Expected date: 03/26/2025  -     carvediloL (COREG) 6.25 MG tablet; Take 1 tablet (6.25 mg total) by mouth 2 (two) times daily with meals.  Dispense: 180 tablet; Refill: 3    2. Primary hypertension         Health Maintenance Topics with due status: Not Due       Topic Last Completion Date    TETANUS VACCINE 08/10/2015    Hemoglobin A1c (Diabetic Prevention Screening) 09/19/2024    Lipid Panel 09/19/2024    RSV Vaccine (Age 60+ and Pregnant patients) Not Due       Future Appointments   Date Time Provider Department Center   4/11/2025 10:40 AM Antonio Wilson MD [facility]   4/28/2025  9:30 AM Antonio Wilson MD [facility]        There are no Patient Instructions on file for this visit.  Follow up in about 4 weeks (around 4/23/2025) for routine followup.     Signature:  Antonio Wilson MD      Date of encounter: 3/26/25

## 2025-03-27 ENCOUNTER — RESULTS FOLLOW-UP (OUTPATIENT)
Dept: FAMILY MEDICINE | Facility: CLINIC | Age: 50
End: 2025-03-27

## 2025-04-08 ENCOUNTER — OFFICE VISIT (OUTPATIENT)
Dept: CARDIOLOGY | Facility: CLINIC | Age: 50
End: 2025-04-08
Payer: OTHER GOVERNMENT

## 2025-04-08 VITALS
DIASTOLIC BLOOD PRESSURE: 90 MMHG | HEIGHT: 66 IN | HEART RATE: 104 BPM | SYSTOLIC BLOOD PRESSURE: 160 MMHG | BODY MASS INDEX: 27.99 KG/M2 | WEIGHT: 174.19 LBS | OXYGEN SATURATION: 97 %

## 2025-04-08 DIAGNOSIS — I10 PRIMARY HYPERTENSION: ICD-10-CM

## 2025-04-08 DIAGNOSIS — R06.09 DYSPNEA ON EXERTION: ICD-10-CM

## 2025-04-08 DIAGNOSIS — R00.0 TACHYCARDIA: Primary | ICD-10-CM

## 2025-04-08 PROCEDURE — 99214 OFFICE O/P EST MOD 30 MIN: CPT | Mod: S$PBB,,, | Performed by: STUDENT IN AN ORGANIZED HEALTH CARE EDUCATION/TRAINING PROGRAM

## 2025-04-08 PROCEDURE — 99214 OFFICE O/P EST MOD 30 MIN: CPT | Mod: PBBFAC | Performed by: STUDENT IN AN ORGANIZED HEALTH CARE EDUCATION/TRAINING PROGRAM

## 2025-04-08 PROCEDURE — 99999 PR PBB SHADOW E&M-EST. PATIENT-LVL IV: CPT | Mod: PBBFAC,,, | Performed by: STUDENT IN AN ORGANIZED HEALTH CARE EDUCATION/TRAINING PROGRAM

## 2025-04-08 RX ORDER — LOSARTAN POTASSIUM AND HYDROCHLOROTHIAZIDE 25; 100 MG/1; MG/1
1 TABLET ORAL DAILY
Qty: 90 TABLET | Refills: 3 | Status: SHIPPED | OUTPATIENT
Start: 2025-04-08 | End: 2026-04-08

## 2025-04-08 RX ORDER — AMLODIPINE BESYLATE 10 MG/1
10 TABLET ORAL DAILY
COMMUNITY

## 2025-04-08 NOTE — PROGRESS NOTES
PCP: Antonio Wilson MD    Referring Provider: Antonio Wilson MD    Subjective:   Laurel Mary is a 49 y.o. female with hx of HTN, OA and suspected chronic inflammatory demyelinating neuropathy who presents for a follow-up visit    4/8/25:  Complains of intermittent chest pain- feels like an electric shock pain almost everyday.  Endorses dyspnea on exertion and occasional palpitations palpitations.  She has been following with ID at [facility].  Noted to have choreiform movements.  ASO titers were ordered.    03/27/2023:  Doing well. Denies any chest pain/tightness, dyspnea, palpitations, lightheadedness or syncope.     12/21/2022: Referred to cardiology for evaluation of chest pain. She endorses a longstanding hx of intermittent episodes of chest pain- at times like chest tightness with and without exertion; as well as occasional sharp and stabbing chest pain. She went to the ED recently on 11/8/2022 with chest pain radiating to right shoulder and jaw. ECG with no acute ischemic changes. High sensitivity troponin was borderline elevated at 63.7 but flat. She was discharged home. Of, she is also anemic with her last Hb noted to be 7.8 on 11/8/22. Iron studies consistent with iron deficiency anemia. Denies melena or BRBPR and notes a negative colonoscopy. She denies dyspnea on exertion, dizziness/lightheadedness, syncopal episodes, palpitations, orthopnea, PND or leg swelling. ROS positive for fatigue.      Fhx: Father (CAD s/p CABG), Mother (DM, HTN)    Shx: Never smoker. No illicit drug use. Drinks alcohol socially.     EKG  04/08/2025: Normal sinus rhythm, possible left atrial enlargement, incomplete right bundle-branch block, LVH by voltage criteria  3/27/23: Sinus tachycardia, possible possible LVH  12/21/2022: Sinus tachycardia, left axis deviation, poor R wave progression    ECHO No results found for this or any previous visit.    LHC No results found for this or any previous visit.      NM MPI  1/5/23  Impression:     1.  Scintigraphically negative for ischemia or infarct.  2. the global left ventricular systolic function is normal with an LV ejection fraction of 60 %.  No evidence of TID.       Lab Results   Component Value Date     11/18/2024    K 3.3 (L) 11/18/2024     11/18/2024    CO2 21 (L) 11/18/2024    BUN 13 11/18/2024    CREATININE 0.83 11/18/2024    CALCIUM 9.6 11/18/2024    ANIONGAP 20 (H) 11/18/2024    EGFRNONAA 61 01/14/2022       Lab Results   Component Value Date    CHOL 256 (H) 09/19/2024     Lab Results   Component Value Date    HDL 67 (H) 09/19/2024     Lab Results   Component Value Date    LDLCALC 170 09/19/2024     Lab Results   Component Value Date    TRIG 94 09/19/2024     Lab Results   Component Value Date    CHOLHDL 3.8 09/19/2024       Lab Results   Component Value Date    WBC 5.24 11/18/2024    HGB 14.2 11/18/2024    HCT 42.9 11/18/2024    MCV 99.1 (H) 11/18/2024     11/18/2024           Current Outpatient Medications:     amLODIPine (NORVASC) 10 MG tablet, Take 10 mg by mouth once daily., Disp: , Rfl:     aspirin (ECOTRIN) 81 MG EC tablet, Take 1 tablet (81 mg total) by mouth once daily., Disp: 90 tablet, Rfl: 1    amLODIPine (NORVASC) 5 MG tablet, Take 1 tablet (5 mg total) by mouth once daily. (Patient not taking: Reported on 4/8/2025), Disp: 90 tablet, Rfl: 3    losartan-hydrochlorothiazide 100-25 mg (HYZAAR) 100-25 mg per tablet, Take 1 tablet by mouth once daily., Disp: 90 tablet, Rfl: 3    potassium chloride SA (K-DUR,KLOR-CON) 20 MEQ tablet, Take 1 tablet (20 mEq total) by mouth once daily. (Patient not taking: Reported on 4/8/2025), Disp: 90 tablet, Rfl: 3    Review of Systems   Constitutional:  Negative for chills and fever.   Respiratory:  Positive for shortness of breath. Negative for cough, hemoptysis and wheezing.    Cardiovascular:  Positive for chest pain. Negative for palpitations, orthopnea, claudication, leg swelling and PND.  "  Gastrointestinal:  Negative for abdominal pain, heartburn, nausea and vomiting.         Objective:   BP (!) 160/90 (BP Location: Left arm, Patient Position: Sitting)   Pulse 104   Ht 5' 6" (1.676 m)   Wt 79 kg (174 lb 3.2 oz)   SpO2 97%   BMI 28.12 kg/m²     Physical Exam  Constitutional:       General: She is not in acute distress.     Appearance: Normal appearance.   HENT:      Head: Normocephalic and atraumatic.   Cardiovascular:      Rate and Rhythm: Normal rate and regular rhythm.      Pulses: Normal pulses.      Heart sounds: Normal heart sounds. No murmur heard.     No friction rub. No gallop.   Pulmonary:      Effort: Pulmonary effort is normal. No respiratory distress.      Breath sounds: Normal breath sounds. No wheezing or rales.   Skin:     General: Skin is warm and dry.   Neurological:      Mental Status: She is alert.           Assessment:     1. Tachycardia  EKG 12-lead    EKG 12-lead      2. Dyspnea on exertion  Echo      3. Primary hypertension  losartan-hydrochlorothiazide 100-25 mg (HYZAAR) 100-25 mg per tablet              Plan:   No problem-specific Assessment & Plan notes found for this encounter.      Atypical chest pain   Stress test negative for ischemia or infarct    Dyspnea on exertion  Schedule echo    HTN  /97 today    Stop losartan  Start losartan-HCTZ 100-25 mg daily   Continue amlodipine 10 mg daily   Follow-up with PCP for BP re-check       Follow up in 6 months    "

## 2025-04-08 NOTE — PATIENT INSTRUCTIONS
Echo on -> April 30, 2025 at 8:00 am  Stop losartan  Start losartan-HCTZ 100-25 mg daily   Continue amlodipine 10 mg daily   Follow-up with PCP for BP re-check   Follow-up in 6 months

## 2025-04-11 ENCOUNTER — OFFICE VISIT (OUTPATIENT)
Dept: FAMILY MEDICINE | Facility: CLINIC | Age: 50
End: 2025-04-11
Payer: OTHER GOVERNMENT

## 2025-04-11 VITALS
HEART RATE: 96 BPM | HEIGHT: 66 IN | WEIGHT: 172 LBS | BODY MASS INDEX: 27.64 KG/M2 | SYSTOLIC BLOOD PRESSURE: 157 MMHG | RESPIRATION RATE: 16 BRPM | OXYGEN SATURATION: 97 % | TEMPERATURE: 98 F | DIASTOLIC BLOOD PRESSURE: 97 MMHG

## 2025-04-11 DIAGNOSIS — I10 PRIMARY HYPERTENSION: Chronic | ICD-10-CM

## 2025-04-11 DIAGNOSIS — R26.9 ABNORMAL GAIT: ICD-10-CM

## 2025-04-11 DIAGNOSIS — G61.81 CIDP (CHRONIC INFLAMMATORY DEMYELINATING POLYNEUROPATHY): ICD-10-CM

## 2025-04-11 DIAGNOSIS — Q66.6 PES PLANOVALGUS: Primary | ICD-10-CM

## 2025-04-11 PROCEDURE — 99213 OFFICE O/P EST LOW 20 MIN: CPT | Mod: ,,, | Performed by: FAMILY MEDICINE

## 2025-04-11 NOTE — PROGRESS NOTES
Antonio Wilson MD        PATIENT NAME: Laurel Mary  : 1975  DATE: 25  MRN: [id number]      Billing Provider: Antonio Wilson MD  Level of Service: [place] OFFICE/OUTPT VISIT, EST, LEVL III, 20-29 MIN  Patient PCP Information       Provider PCP Type    Antonio Wilson MD General            Reason for Visit / Chief Complaint: Follow-up       Update PCP  Update Chief Complaint         History of Present Illness / Problem Focused Workflow     Laurel Mary presents to the clinic with Follow-up     Has seen Dr. Scales and will get echo.  ? Needs kidney specialist.  Has elevated ASO.      Follow-up  Associated symptoms include arthralgias and numbness. Pertinent negatives include no abdominal pain, chest pain, congestion, coughing, fever, headaches, nausea, neck pain, rash, sore throat or weakness (global weakness).       Review of Systems     Review of Systems   Constitutional:  Negative for activity change, appetite change, fever and unexpected weight change.   HENT:  Negative for congestion, rhinorrhea, sinus pressure, sinus pain, sore throat and trouble swallowing.    Eyes:  Negative for photophobia, pain, discharge and visual disturbance.   Respiratory:  Negative for cough, chest tightness, wheezing and stridor.    Cardiovascular:  Negative for chest pain, palpitations and leg swelling.   Gastrointestinal:  Negative for abdominal pain, blood in stool, constipation, diarrhea and nausea.   Endocrine: Negative for polydipsia, polyphagia and polyuria.   Genitourinary:  Negative for difficulty urinating, flank pain and hematuria.   Musculoskeletal:  Positive for arthralgias and gait problem. Negative for neck pain.   Skin:  Negative for rash.   Allergic/Immunologic: Negative for food allergies.   Neurological:  Positive for numbness. Negative for dizziness, tremors, seizures, syncope, weakness (global weakness) and headaches.   Psychiatric/Behavioral:  Negative for behavioral problems, confusion, decreased  concentration, dysphoric mood and hallucinations. The patient is not nervous/anxious.         Medical / Social / Family History     Past Medical History:   Diagnosis Date    CIDP (chronic inflammatory demyelinating polyneuropathy) 3/3/2022    Foot drop     Hypertension     Osteoarthritis        Past Surgical History:   Procedure Laterality Date    ENDOMETRIAL BIOPSY         Social History  Ms.  reports that she has never smoked. She has never been exposed to tobacco smoke. She has never used smokeless tobacco. She reports current alcohol use. She reports that she does not use drugs.    Family History  Ms.'s family history includes Diabetes in her mother; Hypertension in her father and mother; Leukemia in her father.    Medications and Allergies     Medications  No outpatient medications have been marked as taking for the 4/11/25 encounter (Office Visit) with Antonio Wilson MD.       Allergies  Review of patient's allergies indicates:  No Known Allergies    Physical Examination     Vitals:    04/11/25 1107   BP: (!) 157/97   Pulse: 96   Resp: 16   Temp: 98.3 °F (36.8 °C)     Physical Exam  Constitutional:       General: She is not in acute distress.     Appearance: Normal appearance.   HENT:      Head: Normocephalic.      Right Ear: Tympanic membrane and ear canal normal.      Left Ear: Tympanic membrane and ear canal normal.      Nose: Nose normal.      Mouth/Throat:      Mouth: Mucous membranes are moist.      Pharynx: No oropharyngeal exudate.   Eyes:      Extraocular Movements: Extraocular movements intact.      Pupils: Pupils are equal, round, and reactive to light.   Cardiovascular:      Rate and Rhythm: Normal rate and regular rhythm.      Heart sounds: No murmur heard.  Pulmonary:      Effort: Pulmonary effort is normal.      Breath sounds: Normal breath sounds. No wheezing.   Abdominal:      General: Abdomen is flat. Bowel sounds are normal.      Palpations: Abdomen is soft.      Hernia: No hernia is  present.   Musculoskeletal:         General: Deformity present. Normal range of motion.      Cervical back: Normal range of motion and neck supple.      Right lower leg: No edema.      Left lower leg: No edema.      Comments:   Severe pes planus bilaterally   Lymphadenopathy:      Cervical: No cervical adenopathy.   Skin:     General: Skin is warm and dry.      Coloration: Skin is not jaundiced.      Findings: No lesion.   Neurological:      General: No focal deficit present.      Mental Status: She is alert and oriented to person, place, and time.      Cranial Nerves: No cranial nerve deficit.      Gait: Gait normal.   Psychiatric:         Mood and Affect: Mood normal.         Behavior: Behavior normal.         Judgment: Judgment normal.          Assessment and Plan (including Health Maintenance)      Problem List  Smart Sets  Document Outside HM   :    Plan:           Health Maintenance Due   Topic Date Due    HIV Screening  Never done    Mammogram  Never done    Colorectal Cancer Screening  Never done    Shingles Vaccine (1 of 2) Never done    Pneumococcal Vaccines (Age 50+) (1 of 1 - PCV) Never done    Cervical Cancer Screening  06/02/2025       1. Pes planovalgus    2. Abnormal gait    3. Primary hypertension    4. CIDP (chronic inflammatory demyelinating polyneuropathy)         Health Maintenance Topics with due status: Not Due       Topic Last Completion Date    TETANUS VACCINE 08/10/2015    Hemoglobin A1c (Diabetic Prevention Screening) 09/19/2024    Lipid Panel 09/19/2024    RSV Vaccine (Age 60+ and Pregnant patients) Not Due       Future Appointments   Date Time Provider Department Center   4/16/2025  2:30 PM Klaus Muse FNP OB NEURO [facility]   4/28/2025  9:30 AM Antonio Wilson MD [facility]   4/30/2025  8[facility]        There are no Patient Instructions on file for this visit.  Follow up if symptoms worsen or fail to improve.     Signature:   Antonio Wilson MD      Date of encounter: 4/11/25

## 2025-04-25 ENCOUNTER — TELEPHONE (OUTPATIENT)
Dept: CARDIOLOGY | Facility: CLINIC | Age: 50
End: 2025-04-25
Payer: OTHER GOVERNMENT

## 2025-04-28 ENCOUNTER — OFFICE VISIT (OUTPATIENT)
Dept: FAMILY MEDICINE | Facility: CLINIC | Age: 50
End: 2025-04-28
Payer: OTHER GOVERNMENT

## 2025-04-28 VITALS
HEIGHT: 66 IN | TEMPERATURE: 98 F | DIASTOLIC BLOOD PRESSURE: 98 MMHG | BODY MASS INDEX: 27.8 KG/M2 | HEART RATE: 89 BPM | WEIGHT: 173 LBS | OXYGEN SATURATION: 99 % | RESPIRATION RATE: 20 BRPM | SYSTOLIC BLOOD PRESSURE: 158 MMHG

## 2025-04-28 DIAGNOSIS — G61.81 CIDP (CHRONIC INFLAMMATORY DEMYELINATING POLYNEUROPATHY): Primary | ICD-10-CM

## 2025-04-28 DIAGNOSIS — Q66.6 PES PLANOVALGUS: ICD-10-CM

## 2025-04-28 PROCEDURE — 99212 OFFICE O/P EST SF 10 MIN: CPT | Mod: ,,, | Performed by: FAMILY MEDICINE

## 2025-04-29 ENCOUNTER — TELEPHONE (OUTPATIENT)
Dept: CARDIOLOGY | Facility: CLINIC | Age: 50
End: 2025-04-29
Payer: OTHER GOVERNMENT

## 2025-04-29 NOTE — TELEPHONE ENCOUNTER
----- Message from Maricruz sent at 4/29/2025  3:22 PM CDT -----  Regarding: AUTH FROM [employer]  Who Called: Laurel Bains Left Message for Patient:Does the patient know what this is regarding?:YESPreferred Method of Contact: Phone CallPatient's Preferred Phone Number on File: [phone] Best Call Back Number, if different:Additional Information: PATIENT IS CALLING TO VERIFY THAT HER  AUTHORIZATION WAS RECEIVED SO THAT SHE CAN HAVE HER ECHO TOMORROW.

## 2025-04-29 NOTE — TELEPHONE ENCOUNTER
----- Message from Nahomi sent at 4/29/2025 12:54 PM CDT -----  Who Called: Laurel Wilde's Preferred Phone Number on File: [phone] Best Call Back Number, if different:Additional Information: pt was calling to see if if you received the [facility] for her appt that is scheduled for tmr

## 2025-04-29 NOTE — TELEPHONE ENCOUNTER
Attempted to call the patient back in regards to her [facility] authorization.  The clinic has not received this at this time.  The patient did not answer the phone nor did she have a voicemail to leave a message.

## 2025-04-29 NOTE — PROGRESS NOTES
Antonio Wilson MD        PATIENT NAME: Laurel Mary  : 1975  DATE: 25  MRN: [id number]      Billing Provider: Antonio Wilson MD  Level of Service: [place] OFFICE/OUTPT VISIT, EST, LEVL II, 10-19 MIN  Patient PCP Information       Provider PCP Type    Antonio Wilson MD General            Reason for Visit / Chief Complaint: Referral (Needs referral to orthopedics and neurology )       Update PCP  Update Chief Complaint         History of Present Illness / Problem Focused Workflow     Laurel Mary presents to the clinic with Referral (Needs referral to orthopedics and neurology )     Patient is from follow up multiple medical problems.  Today does not need referral to Orthopedics for her pes planus bilaterally        Review of Systems     Review of Systems   Constitutional:  Negative for activity change, appetite change, fever and unexpected weight change.   HENT:  Negative for congestion, rhinorrhea, sinus pressure, sinus pain, sore throat and trouble swallowing.    Eyes:  Negative for photophobia, pain, discharge and visual disturbance.   Respiratory:  Negative for cough, chest tightness, wheezing and stridor.    Cardiovascular:  Negative for chest pain, palpitations and leg swelling.   Gastrointestinal:  Negative for abdominal pain, blood in stool, constipation, diarrhea and nausea.   Endocrine: Negative for polydipsia, polyphagia and polyuria.   Genitourinary:  Negative for difficulty urinating, flank pain and hematuria.   Musculoskeletal:  Positive for arthralgias, back pain and gait problem. Negative for neck pain.   Skin:  Negative for rash.   Allergic/Immunologic: Negative for food allergies.   Neurological:  Positive for weakness (global weakness). Negative for dizziness, tremors, seizures, syncope and headaches.   Psychiatric/Behavioral:  Negative for behavioral problems, confusion, decreased concentration, dysphoric mood and hallucinations. The patient is not nervous/anxious.         Medical  / Social / Family History     Past Medical History:   Diagnosis Date    CIDP (chronic inflammatory demyelinating polyneuropathy) 3/3/2022    Foot drop     Hypertension     Osteoarthritis        Past Surgical History:   Procedure Laterality Date    ENDOMETRIAL BIOPSY         Social History  Ms.  reports that she has never smoked. She has never been exposed to tobacco smoke. She has never used smokeless tobacco. She reports current alcohol use. She reports that she does not use drugs.    Family History  Ms.'s family history includes Diabetes in her mother; Hypertension in her father and mother; Leukemia in her father.    Medications and Allergies     Medications  No outpatient medications have been marked as taking for the 4/28/25 encounter (Office Visit) with Antonio Wilson MD.       Allergies  Review of patient's allergies indicates:  No Known Allergies    Physical Examination     Vitals:    04/28/25 0958   BP: (!) 158/98   Pulse:    Resp:    Temp:      Physical Exam  Constitutional:       General: She is not in acute distress.     Appearance: Normal appearance.   HENT:      Head: Normocephalic.      Right Ear: Tympanic membrane and ear canal normal.      Left Ear: Tympanic membrane and ear canal normal.      Nose: Nose normal.      Mouth/Throat:      Mouth: Mucous membranes are moist.      Pharynx: No oropharyngeal exudate.   Eyes:      Extraocular Movements: Extraocular movements intact.      Pupils: Pupils are equal, round, and reactive to light.   Cardiovascular:      Rate and Rhythm: Normal rate and regular rhythm.      Heart sounds: No murmur heard.  Pulmonary:      Effort: Pulmonary effort is normal.      Breath sounds: Normal breath sounds. No wheezing.   Abdominal:      General: Abdomen is flat. Bowel sounds are normal.      Palpations: Abdomen is soft.      Hernia: No hernia is present.   Musculoskeletal:         General: Normal range of motion.      Cervical back: Normal range of motion and neck  supple.      Right lower leg: No edema.      Left lower leg: No edema.   Lymphadenopathy:      Cervical: No cervical adenopathy.   Skin:     General: Skin is warm and dry.      Coloration: Skin is not jaundiced.      Findings: No lesion.   Neurological:      General: No focal deficit present.      Mental Status: She is alert and oriented to person, place, and time. Mental status is at baseline.      Cranial Nerves: No cranial nerve deficit.      Gait: Gait abnormal.   Psychiatric:         Mood and Affect: Mood normal.         Behavior: Behavior normal.         Judgment: Judgment normal.          Assessment and Plan (including Health Maintenance)      Problem List  Smart Sets  Document Outside HM   :    Plan:           Health Maintenance Due   Topic Date Due    HIV Screening  Never done    Mammogram  Never done    Colorectal Cancer Screening  Never done    Shingles Vaccine (1 of 2) Never done    Pneumococcal Vaccines (Age 50+) (1 of 1 - PCV) Never done    Cervical Cancer Screening  06/02/2025       1. CIDP (chronic inflammatory demyelinating polyneuropathy)    2. Pes planovalgus         Health Maintenance Topics with due status: Not Due       Topic Last Completion Date    TETANUS VACCINE 08/10/2015    Hemoglobin A1c (Diabetic Prevention Screening) 09/19/2024    Lipid Panel 09/19/2024    RSV Vaccine (Age 60+ and Pregnant patients) Not Due       Future Appointments   Date Time Provider Department Center   4/30/2025  2:30 [facility]        There are no Patient Instructions on file for this visit.  Follow up if symptoms worsen or fail to improve.     Signature:  Antonio Wilson MD      Date of encounter: 4/28/25

## 2025-04-30 ENCOUNTER — HOSPITAL ENCOUNTER (OUTPATIENT)
Dept: CARDIOLOGY | Facility: HOSPITAL | Age: 50
Discharge: HOME | End: 2025-04-30
Attending: STUDENT IN AN ORGANIZED HEALTH CARE EDUCATION/TRAINING PROGRAM
Payer: OTHER GOVERNMENT

## 2025-04-30 ENCOUNTER — TELEPHONE (OUTPATIENT)
Dept: FAMILY MEDICINE | Facility: CLINIC | Age: 50
End: 2025-04-30
Payer: OTHER GOVERNMENT

## 2025-04-30 DIAGNOSIS — B95.1 GROUP BETA STREP POSITIVE: Primary | ICD-10-CM

## 2025-04-30 DIAGNOSIS — R06.09 DYSPNEA ON EXERTION: ICD-10-CM

## 2025-04-30 PROCEDURE — 93306 TTE W/DOPPLER COMPLETE: CPT | Mod: 26,,, | Performed by: STUDENT IN AN ORGANIZED HEALTH CARE EDUCATION/TRAINING PROGRAM

## 2025-04-30 PROCEDURE — 93306 TTE W/DOPPLER COMPLETE: CPT

## 2025-04-30 NOTE — TELEPHONE ENCOUNTER
Spoke with patient and notified her that the office did in fact receive the authorization late yesterday.

## 2025-05-03 LAB
AORTIC VALVE CUSP SEPERATION: 1.65 CM
AV INDEX (PROSTH): 0.72
AV MEAN GRADIENT: 4 MMHG
AV PEAK GRADIENT: 8 MMHG
AV VALVE AREA BY VELOCITY RATIO: 2.5 CM²
AV VALVE AREA: 2.3 CM²
AV VELOCITY RATIO: 0.79
CV ECHO LV RWT: 0.37 CM
DOP CALC AO PEAK VEL: 1.4 M/S
DOP CALC AO VTI: 24.5 CM
DOP CALC LVOT AREA: 3.1 CM2
DOP CALC LVOT DIAMETER: 2 CM
DOP CALC LVOT PEAK VEL: 1.1 M/S
DOP CALC LVOT STROKE VOLUME: 55.3 CM3
DOP CALC MV VTI: 22.6 CM
DOP CALCLVOT PEAK VEL VTI: 17.6 CM
E WAVE DECELERATION TIME: 135 MSEC
E/A RATIO: 0.7
E/E' RATIO: 8 M/S
ECHO LV POSTERIOR WALL: 0.8 CM (ref 0.6–1.1)
FRACTIONAL SHORTENING: 18.6 % (ref 28–44)
INTERVENTRICULAR SEPTUM: 1.1 CM (ref 0.6–1.1)
IVC DIAMETER: 1.38 CM
IVRT: 76 MSEC
LEFT ATRIUM AREA SYSTOLIC (APICAL 2 CHAMBER): 11.64 CM2
LEFT ATRIUM AREA SYSTOLIC (APICAL 4 CHAMBER): 15.34 CM2
LEFT ATRIUM SIZE: 3.3 CM
LEFT ATRIUM VOLUME MOD: 35 ML
LEFT INTERNAL DIMENSION IN SYSTOLE: 3.5 CM (ref 2.1–4)
LEFT VENTRICLE DIASTOLIC VOLUME: 106 ML
LEFT VENTRICLE END SYSTOLIC VOLUME APICAL 2 CHAMBER: 27.03 ML
LEFT VENTRICLE END SYSTOLIC VOLUME APICAL 4 CHAMBER: 41.03 ML
LEFT VENTRICLE SYSTOLIC VOLUME: 49 ML
LEFT VENTRICULAR INTERNAL DIMENSION IN DIASTOLE: 4.3 CM (ref 3.5–6)
LEFT VENTRICULAR MASS: 132.7 G
LV LATERAL E/E' RATIO: 6.3 M/S
LV SEPTAL E/E' RATIO: 10.5 M/S
LVED V (TEICH): 106.35 ML
LVES V (TEICH): 49.37 ML
LVOT MG: 2.88 MMHG
LVOT MV: 0.83 CM/S
MV MEAN GRADIENT: 1 MMHG
MV PEAK A VEL: 0.9 M/S
MV PEAK E VEL: 0.63 M/S
MV PEAK GRADIENT: 3 MMHG
MV STENOSIS PRESSURE HALF TIME: 39.24 MS
MV VALVE AREA BY CONTINUITY EQUATION: 2.45 CM2
MV VALVE AREA P 1/2 METHOD: 5.61 CM2
OHS CV RV/LV RATIO: 0.7 CM
OHS LV EJECTION FRACTION SIMPSONS BIPLANE MOD: 50 %
PISA AR MAX VEL: 2.4 M/S
PISA TR MAX VEL: 2.3 M/S
PV MV: 0.67 M/S
PV PEAK GRADIENT: 4 MMHG
PV PEAK VELOCITY: 1.01 M/S
RA PRESSURE ESTIMATED: 3 MMHG
RA VOL SYS: 34.65 ML
RIGHT ATRIAL AREA: 14.3 CM2
RIGHT ATRIUM VOLUME AREA LENGTH APICAL 4 CHAMBER: 29.79 ML
RIGHT VENTRICLE DIASTOLIC BASEL DIMENSION: 3 CM
RIGHT VENTRICLE DIASTOLIC LENGTH: 6.4 CM
RIGHT VENTRICLE DIASTOLIC MID DIMENSION: 1.9 CM
RIGHT VENTRICULAR END-DIASTOLIC DIMENSION: 3.03 CM
RIGHT VENTRICULAR LENGTH IN DIASTOLE (APICAL 4-CHAMBER VIEW): 6.4 CM
RV MID DIAMA: 1.94 CM
RV TB RVSP: 5 MMHG
TDI LATERAL: 0.1 M/S
TDI SEPTAL: 0.06 M/S
TDI: 0.08 M/S
TR MAX PG: 22 MMHG
TRICUSPID ANNULAR PLANE SYSTOLIC EXCURSION: 2.4 CM
TV REST PULMONARY ARTERY PRESSURE: 24 MMHG

## 2025-05-05 ENCOUNTER — RESULTS FOLLOW-UP (OUTPATIENT)
Dept: CARDIOLOGY | Facility: CLINIC | Age: 50
End: 2025-05-05

## 2025-05-14 ENCOUNTER — OFFICE VISIT (OUTPATIENT)
Dept: CARDIOLOGY | Facility: CLINIC | Age: 50
End: 2025-05-14
Payer: OTHER GOVERNMENT

## 2025-05-14 VITALS
DIASTOLIC BLOOD PRESSURE: 90 MMHG | WEIGHT: 167 LBS | HEIGHT: 66 IN | BODY MASS INDEX: 26.84 KG/M2 | HEART RATE: 80 BPM | SYSTOLIC BLOOD PRESSURE: 138 MMHG | OXYGEN SATURATION: 100 %

## 2025-05-14 DIAGNOSIS — I10 PRIMARY HYPERTENSION: Primary | Chronic | ICD-10-CM

## 2025-05-14 PROCEDURE — 99213 OFFICE O/P EST LOW 20 MIN: CPT | Mod: PBBFAC | Performed by: NURSE PRACTITIONER

## 2025-05-14 PROCEDURE — 99214 OFFICE O/P EST MOD 30 MIN: CPT | Mod: S$PBB,,, | Performed by: NURSE PRACTITIONER

## 2025-05-14 PROCEDURE — 99999 PR PBB SHADOW E&M-EST. PATIENT-LVL III: CPT | Mod: PBBFAC,,, | Performed by: NURSE PRACTITIONER

## 2025-05-14 NOTE — PROGRESS NOTES
"PCP: Antonio Wilson MD    Referring Provider: Antonio Wilson MD    Subjective:   Laurel Mary is a 49 y.o. female with hx of HTN, OA and suspected chronic inflammatory demyelinating neuropathy who presents for concern for chest pain and SOB.     5/14/25: Pt has c/o intermittent chest pain described as "straining or electric shock." She states she has been having this same pain since 2023. She had a normal NM stress test in 2023. She also has c/o SOB on exertion and lower extremity edema. Echo 4/30/25 showed EF 50-55% with no significant RWMA and no significant valvular abnormalities.     4/8/25:  Complains of intermittent chest pain- feels like an electric shock pain almost everyday.  Endorses dyspnea on exertion and occasional palpitations palpitations.  She has been following with ID at [facility].  Noted to have choreiform movements.  ASO titers were ordered.    03/27/2023:  Doing well. Denies any chest pain/tightness, dyspnea, palpitations, lightheadedness or syncope.     12/21/2022: Referred to cardiology for evaluation of chest pain. She endorses a longstanding hx of intermittent episodes of chest pain- at times like chest tightness with and without exertion; as well as occasional sharp and stabbing chest pain. She went to the ED recently on 11/8/2022 with chest pain radiating to right shoulder and jaw. ECG with no acute ischemic changes. High sensitivity troponin was borderline elevated at 63.7 but flat. She was discharged home. Of, she is also anemic with her last Hb noted to be 7.8 on 11/8/22. Iron studies consistent with iron deficiency anemia. Denies melena or BRBPR and notes a negative colonoscopy. She denies dyspnea on exertion, dizziness/lightheadedness, syncopal episodes, palpitations, orthopnea, PND or leg swelling. ROS positive for fatigue.      Fhx: Father (CAD s/p CABG), Mother (DM, HTN)    Shx: Never smoker. No illicit drug use. Drinks alcohol socially.     EKG  04/08/2025: Normal sinus rhythm, " possible left atrial enlargement, incomplete right bundle-branch block, LVH by voltage criteria  3/27/23: Sinus tachycardia, possible possible LVH  12/21/2022: Sinus tachycardia, left axis deviation, poor R wave progression    ECHO No results found for this or any previous visit.    [facility] No results found for this or any previous visit.      NM MPI 1/5/23  Impression:     1.  Scintigraphically negative for ischemia or infarct.  2. the global left ventricular systolic function is normal with an LV ejection fraction of 60 %.  No evidence of TID.       Lab Results   Component Value Date     11/18/2024    K 3.3 (L) 11/18/2024     11/18/2024    CO2 21 (L) 11/18/2024    BUN 13 11/18/2024    CREATININE 0.83 11/18/2024    CALCIUM 9.6 11/18/2024    ANIONGAP 20 (H) 11/18/2024    EGFRNONAA 61 01/14/2022       Lab Results   Component Value Date    CHOL 256 (H) 09/19/2024     Lab Results   Component Value Date    HDL 67 (H) 09/19/2024     Lab Results   Component Value Date    LDLCALC 170 09/19/2024     Lab Results   Component Value Date    TRIG 94 09/19/2024     Lab Results   Component Value Date    CHOLHDL 3.8 09/19/2024       Lab Results   Component Value Date    WBC 5.24 11/18/2024    HGB 14.2 11/18/2024    HCT 42.9 11/18/2024    MCV 99.1 (H) 11/18/2024     11/18/2024           Current Outpatient Medications:     amLODIPine (NORVASC) 10 MG tablet, Take 10 mg by mouth once daily., Disp: , Rfl:     aspirin (ECOTRIN) 81 MG EC tablet, Take 1 tablet (81 mg total) by mouth once daily., Disp: 90 tablet, Rfl: 1    losartan-hydrochlorothiazide 100-25 mg (HYZAAR) 100-25 mg per tablet, Take 1 tablet by mouth once daily., Disp: 90 tablet, Rfl: 3    potassium chloride SA (K-DUR,KLOR-CON) 20 MEQ tablet, Take 1 tablet (20 mEq total) by mouth once daily. (Patient not taking: No sig reported), Disp: 90 tablet, Rfl: 3    Review of Systems   Constitutional:  Negative for chills and fever.   Respiratory:  Positive for shortness  "of breath. Negative for cough, hemoptysis and wheezing.    Cardiovascular:  Positive for chest pain. Negative for palpitations, orthopnea, claudication, leg swelling and PND.   Gastrointestinal:  Negative for abdominal pain, heartburn, nausea and vomiting.         Objective:   BP (!) 138/90 (BP Location: Left arm, Patient Position: Sitting)   Pulse 80   Ht 5' 6" (1.676 m)   Wt 75.8 kg (167 lb)   SpO2 100%   BMI 26.95 kg/m²     Physical Exam  Constitutional:       General: She is not in acute distress.     Appearance: Normal appearance.   Cardiovascular:      Rate and Rhythm: Normal rate and regular rhythm.      Pulses: Normal pulses.      Heart sounds: Normal heart sounds. No murmur heard.     No friction rub. No gallop.   Pulmonary:      Effort: Pulmonary effort is normal.      Breath sounds: Normal breath sounds.   Musculoskeletal:      Cervical back: Neck supple. No rigidity.      Right lower leg: No edema.      Left lower leg: No edema.   Skin:     General: Skin is warm and dry.   Neurological:      Mental Status: She is alert.           Assessment:     1. Primary hypertension                  Plan:     Atypical chest pain   Stress test negative for ischemia or infarct  Echo within normal limits    Dyspnea on exertion  Echo within normal limits    HTN  BP better controlled    Continue losartan-HCTZ 100-25 mg daily   Continue amlodipine 10 mg daily   Continue to follow with PCP      Follow up as already scheduled with Dr. Stanley in 6 months    "

## 2025-05-20 ENCOUNTER — TELEPHONE (OUTPATIENT)
Dept: FAMILY MEDICINE | Facility: CLINIC | Age: 50
End: 2025-05-20
Payer: OTHER GOVERNMENT

## 2025-05-20 DIAGNOSIS — B95.1 GROUP BETA STREP POSITIVE: Primary | ICD-10-CM

## 2025-05-27 ENCOUNTER — OFFICE VISIT (OUTPATIENT)
Dept: FAMILY MEDICINE | Facility: CLINIC | Age: 50
End: 2025-05-27
Payer: OTHER GOVERNMENT

## 2025-05-27 VITALS
HEIGHT: 66 IN | TEMPERATURE: 97 F | DIASTOLIC BLOOD PRESSURE: 117 MMHG | HEART RATE: 82 BPM | BODY MASS INDEX: 26.68 KG/M2 | WEIGHT: 166 LBS | OXYGEN SATURATION: 98 % | RESPIRATION RATE: 20 BRPM | SYSTOLIC BLOOD PRESSURE: 162 MMHG

## 2025-05-27 DIAGNOSIS — G61.81 CIDP (CHRONIC INFLAMMATORY DEMYELINATING POLYNEUROPATHY): Primary | ICD-10-CM

## 2025-05-27 DIAGNOSIS — G61.81 CIDP (CHRONIC INFLAMMATORY DEMYELINATING POLYNEUROPATHY): ICD-10-CM

## 2025-05-27 DIAGNOSIS — Q66.6 PES PLANOVALGUS: ICD-10-CM

## 2025-05-27 DIAGNOSIS — M21.371 ACQUIRED RIGHT FOOT DROP: ICD-10-CM

## 2025-05-27 DIAGNOSIS — N18.31 STAGE 3A CHRONIC KIDNEY DISEASE: Primary | ICD-10-CM

## 2025-05-27 DIAGNOSIS — I10 PRIMARY HYPERTENSION: Chronic | ICD-10-CM

## 2025-05-27 PROCEDURE — 99213 OFFICE O/P EST LOW 20 MIN: CPT | Mod: ,,, | Performed by: FAMILY MEDICINE

## 2025-05-27 NOTE — PROGRESS NOTES
Antonio Wilson MD        PATIENT NAME: Laurel Mary  : 1975  DATE: 25  MRN: [id number]      Billing Provider: Antonio Wilson MD  Level of Service: [place] OFFICE/OUTPT VISIT, EST, LEVL III, 20-29 MIN  Patient PCP Information       Provider PCP Type    Antonio Wilson MD General            Reason for Visit / Chief Complaint: Follow-up (Concerned about kidney function)       Update PCP  Update Chief Complaint         History of Present Illness / Problem Focused Workflow     Laurel Mary presents to the clinic with Follow-up (Concerned about kidney function)     Pt worried that something is wrong with kidneys.      Follow-up  Associated symptoms include arthralgias, numbness and weakness (global weakness). Pertinent negatives include no abdominal pain, chest pain, congestion, coughing, fever, headaches, nausea, neck pain, rash or sore throat.       Review of Systems     Review of Systems   Constitutional:  Negative for activity change, appetite change, fever and unexpected weight change.   HENT:  Negative for congestion, rhinorrhea, sinus pressure, sinus pain, sore throat and trouble swallowing.    Eyes:  Negative for photophobia, pain, discharge and visual disturbance.   Respiratory:  Negative for cough, chest tightness, wheezing and stridor.    Cardiovascular:  Negative for chest pain, palpitations and leg swelling.   Gastrointestinal:  Negative for abdominal pain, blood in stool, constipation, diarrhea and nausea.   Endocrine: Negative for polydipsia, polyphagia and polyuria.   Genitourinary:  Negative for difficulty urinating, flank pain and hematuria.   Musculoskeletal:  Positive for arthralgias, back pain and gait problem. Negative for neck pain.   Skin:  Negative for rash.   Allergic/Immunologic: Negative for food allergies.   Neurological:  Positive for weakness (global weakness) and numbness. Negative for dizziness, tremors, seizures, syncope and headaches.   Psychiatric/Behavioral:  Negative  for behavioral problems, confusion, decreased concentration, dysphoric mood and hallucinations. The patient is not nervous/anxious.         Medical / Social / Family History     Past Medical History:   Diagnosis Date    CIDP (chronic inflammatory demyelinating polyneuropathy) 3/3/2022    Foot drop     Hypertension     Osteoarthritis        Past Surgical History:   Procedure Laterality Date    ENDOMETRIAL BIOPSY         Social History  Ms.  reports that she has never smoked. She has never been exposed to tobacco smoke. She has never used smokeless tobacco. She reports current alcohol use. She reports that she does not use drugs.    Family History  Ms.'s family history includes Diabetes in her mother; Hypertension in her father and mother; Leukemia in her father.    Medications and Allergies     Medications  No outpatient medications have been marked as taking for the 5/27/25 encounter (Office Visit) with Antonio Wilson MD.       Allergies  Review of patient's allergies indicates:  No Known Allergies    Physical Examination     Vitals:    05/27/25 1102   BP: (!) 162/117   Pulse:    Resp:    Temp:      Physical Exam  Constitutional:       General: She is not in acute distress.     Appearance: Normal appearance.   HENT:      Head: Normocephalic.      Right Ear: Tympanic membrane and ear canal normal.      Left Ear: Tympanic membrane and ear canal normal.      Nose: Nose normal.      Mouth/Throat:      Mouth: Mucous membranes are moist.      Pharynx: No oropharyngeal exudate.   Eyes:      Extraocular Movements: Extraocular movements intact.      Pupils: Pupils are equal, round, and reactive to light.   Cardiovascular:      Rate and Rhythm: Normal rate and regular rhythm.      Heart sounds: No murmur heard.  Pulmonary:      Effort: Pulmonary effort is normal.      Breath sounds: Normal breath sounds. No wheezing.   Abdominal:      General: Abdomen is flat. Bowel sounds are normal.      Palpations: Abdomen is soft.       Hernia: No hernia is present.   Musculoskeletal:         General: Normal range of motion.      Cervical back: Normal range of motion and neck supple.      Right lower leg: No edema.      Left lower leg: No edema.   Lymphadenopathy:      Cervical: No cervical adenopathy.   Skin:     General: Skin is warm and dry.      Coloration: Skin is not jaundiced.      Findings: No lesion.   Neurological:      General: No focal deficit present.      Mental Status: She is alert and oriented to person, place, and time. Mental status is at baseline.      Cranial Nerves: No cranial nerve deficit.      Gait: Gait normal.   Psychiatric:         Mood and Affect: Mood normal.         Behavior: Behavior normal.         Judgment: Judgment normal.          Assessment and Plan (including Health Maintenance)      Problem List  Smart Sets  Document Outside HM   :    Plan:           Health Maintenance Due   Topic Date Due    HIV Screening  Never done    Mammogram  Never done    Colorectal Cancer Screening  Never done    Shingles Vaccine (1 of 2) Never done    Pneumococcal Vaccines (Age 50+) (1 of 1 - PCV) Never done    Cervical Cancer Screening  06/02/2025       1. Stage 3a chronic kidney disease  -     Ambulatory referral/consult to Nephrology; Future; Expected date: 06/03/2025    2. CIDP (chronic inflammatory demyelinating polyneuropathy)    3. Acquired right foot drop    4. Primary hypertension    5. Pes planovalgus         Health Maintenance Topics with due status: Not Due       Topic Last Completion Date    TETANUS VACCINE 08/10/2015    Hemoglobin A1c (Diabetic Prevention Screening) 09/19/2024    Lipid Panel 09/19/2024    RSV Vaccine (Age 60+ and Pregnant patients) Not Due       No future appointments.     There are no Patient Instructions on file for this visit.  Follow up if symptoms worsen or fail to improve.     Signature:  Antonio Wilson MD      Date of encounter: 5/27/25

## 2025-05-28 PROBLEM — N18.31 STAGE 3A CHRONIC KIDNEY DISEASE: Chronic | Status: ACTIVE | Noted: 2025-05-28

## 2025-06-04 ENCOUNTER — OFFICE VISIT (OUTPATIENT)
Dept: NEPHROLOGY | Facility: CLINIC | Age: 50
End: 2025-06-04
Payer: OTHER GOVERNMENT

## 2025-06-04 VITALS
SYSTOLIC BLOOD PRESSURE: 140 MMHG | HEART RATE: 88 BPM | HEIGHT: 66 IN | DIASTOLIC BLOOD PRESSURE: 100 MMHG | RESPIRATION RATE: 16 BRPM | OXYGEN SATURATION: 98 % | WEIGHT: 165.38 LBS | BODY MASS INDEX: 26.58 KG/M2

## 2025-06-04 DIAGNOSIS — N18.2 CKD (CHRONIC KIDNEY DISEASE) STAGE 2, GFR 60-89 ML/MIN: Primary | ICD-10-CM

## 2025-06-04 DIAGNOSIS — F42.3 HOARDING DISORDER: ICD-10-CM

## 2025-06-04 DIAGNOSIS — I12.9 HYPERTENSIVE NEPHROSCLEROSIS, STAGE 1 THROUGH STAGE 4 OR UNSPECIFIED CHRONIC KIDNEY DISEASE: ICD-10-CM

## 2025-06-04 DIAGNOSIS — G61.81 CIDP (CHRONIC INFLAMMATORY DEMYELINATING POLYNEUROPATHY): ICD-10-CM

## 2025-06-04 PROCEDURE — 99204 OFFICE O/P NEW MOD 45 MIN: CPT | Mod: S$PBB,,, | Performed by: NURSE PRACTITIONER

## 2025-06-04 PROCEDURE — 99215 OFFICE O/P EST HI 40 MIN: CPT | Mod: PBBFAC | Performed by: NURSE PRACTITIONER

## 2025-06-04 PROCEDURE — 99999 PR PBB SHADOW E&M-EST. PATIENT-LVL V: CPT | Mod: PBBFAC,,, | Performed by: NURSE PRACTITIONER

## 2025-06-04 RX ORDER — HYDROCHLOROTHIAZIDE 12.5 MG/1
12.5 TABLET ORAL
COMMUNITY
Start: 2025-05-27 | End: 2025-06-05

## 2025-06-06 ENCOUNTER — TELEPHONE (OUTPATIENT)
Dept: NEPHROLOGY | Facility: CLINIC | Age: 50
End: 2025-06-06
Payer: OTHER GOVERNMENT

## 2025-06-06 ENCOUNTER — RESULTS FOLLOW-UP (OUTPATIENT)
Dept: NEPHROLOGY | Facility: CLINIC | Age: 50
End: 2025-06-06

## 2025-06-06 ENCOUNTER — TELEPHONE (OUTPATIENT)
Dept: FAMILY MEDICINE | Facility: CLINIC | Age: 50
End: 2025-06-06
Payer: OTHER GOVERNMENT

## 2025-06-06 DIAGNOSIS — E55.9 VITAMIN D DEFICIENCY, UNSPECIFIED: Primary | ICD-10-CM

## 2025-06-06 RX ORDER — ERGOCALCIFEROL 1.25 MG/1
50000 CAPSULE ORAL
Qty: 12 CAPSULE | Refills: 0 | Status: SHIPPED | OUTPATIENT
Start: 2025-06-06 | End: 2025-08-23

## 2025-06-10 ENCOUNTER — TELEPHONE (OUTPATIENT)
Dept: FAMILY MEDICINE | Facility: CLINIC | Age: 50
End: 2025-06-10
Payer: OTHER GOVERNMENT

## 2025-06-10 NOTE — TELEPHONE ENCOUNTER
Copied from CRM #[id number]. Topic: General Inquiry - Patient Advice  >> Catarino 10, 2025  9:02 KELLY Matos wrote:  [facility] calling for some [facility] forms on appts pt needs. Needs one for Infectious Disease, Nephrology-pt has already been seen on 06/04 and one for an Internal Med . Please fax to [phone]. [facility] will fax over a req for services form that they are using in case you dont have one.    Forwarded message to referral clerk.

## 2025-08-12 ENCOUNTER — PATIENT MESSAGE (OUTPATIENT)
Facility: HOSPITAL | Age: 50
End: 2025-08-12
Payer: OTHER GOVERNMENT

## 2025-09-04 ENCOUNTER — OFFICE VISIT (OUTPATIENT)
Dept: PODIATRY | Facility: CLINIC | Age: 50
End: 2025-09-04

## 2025-09-04 RX ORDER — LOSARTAN POTASSIUM 100 MG/1
100 TABLET ORAL DAILY
COMMUNITY
Start: 2025-08-28

## 2025-09-04 RX ORDER — AMLODIPINE BESYLATE 10 MG/1
10 TABLET ORAL DAILY
COMMUNITY
Start: 2025-03-09

## 2025-09-04 RX ORDER — HYDROCHLOROTHIAZIDE 12.5 MG/1
12.5 TABLET ORAL DAILY
COMMUNITY
Start: 2025-08-28